# Patient Record
Sex: MALE | Race: WHITE | NOT HISPANIC OR LATINO | Employment: OTHER | URBAN - METROPOLITAN AREA
[De-identification: names, ages, dates, MRNs, and addresses within clinical notes are randomized per-mention and may not be internally consistent; named-entity substitution may affect disease eponyms.]

---

## 2017-02-17 ENCOUNTER — RADIANT APPOINTMENT (OUTPATIENT)
Dept: GENERAL RADIOLOGY | Facility: CLINIC | Age: 42
End: 2017-02-17
Attending: INTERNAL MEDICINE
Payer: MEDICARE

## 2017-02-17 ENCOUNTER — OFFICE VISIT (OUTPATIENT)
Dept: FAMILY MEDICINE | Facility: CLINIC | Age: 42
End: 2017-02-17
Payer: MEDICARE

## 2017-02-17 VITALS
HEIGHT: 65 IN | BODY MASS INDEX: 32.29 KG/M2 | WEIGHT: 193.8 LBS | SYSTOLIC BLOOD PRESSURE: 136 MMHG | DIASTOLIC BLOOD PRESSURE: 79 MMHG | TEMPERATURE: 98.4 F | OXYGEN SATURATION: 95 % | HEART RATE: 76 BPM

## 2017-02-17 DIAGNOSIS — M25.551 PAIN OF BOTH HIP JOINTS: ICD-10-CM

## 2017-02-17 DIAGNOSIS — E66.811 OBESITY (BMI 30.0-34.9): ICD-10-CM

## 2017-02-17 DIAGNOSIS — K12.0 CANKER SORE: ICD-10-CM

## 2017-02-17 DIAGNOSIS — M25.552 PAIN OF BOTH HIP JOINTS: ICD-10-CM

## 2017-02-17 DIAGNOSIS — M79.645 PAIN OF FINGER OF LEFT HAND: Primary | ICD-10-CM

## 2017-02-17 PROCEDURE — 73130 X-RAY EXAM OF HAND: CPT | Mod: LT

## 2017-02-17 PROCEDURE — 73522 X-RAY EXAM HIPS BI 3-4 VIEWS: CPT

## 2017-02-17 PROCEDURE — 99214 OFFICE O/P EST MOD 30 MIN: CPT | Performed by: INTERNAL MEDICINE

## 2017-02-17 RX ORDER — TRIAMCINOLONE ACETONIDE 0.1 %
PASTE (GRAM) DENTAL 2 TIMES DAILY
Qty: 5 G | Refills: 1 | Status: SHIPPED | OUTPATIENT
Start: 2017-02-17 | End: 2019-08-19

## 2017-02-17 NOTE — NURSING NOTE
"Chief Complaint   Patient presents with     Hip Pain     x 2 months, bilateral hip pain,      Mouth Lesions     x 1 month, baking soda       Initial /79  Pulse 76  Temp 98.4  F (36.9  C) (Tympanic)  Ht 5' 4.75\" (1.645 m)  Wt 193 lb 12.8 oz (87.9 kg)  SpO2 95%  BMI 32.5 kg/m2 Estimated body mass index is 32.5 kg/(m^2) as calculated from the following:    Height as of this encounter: 5' 4.75\" (1.645 m).    Weight as of this encounter: 193 lb 12.8 oz (87.9 kg).  Medication Reconciliation: complete   Carrie MENDES CMA (AAMA)    "

## 2017-02-17 NOTE — PROGRESS NOTES
SUBJECTIVE:                                                    Yusef Alvares is a 41 year old male who presents to clinic today for the following health issues:      Joint Pain     Onset: x 2 months     Description:   Location: left hip and right hip within groin, no lateral pain  Character: Sharp and Dull ache - tolerable     Intensity: mild    Progression of Symptoms: same    Accompanying Signs & Symptoms:  Other symptoms: other random joint pain - left thumb and index finger w/ some limited range of motion and swelling- no redness or warmth.    History:   Previous similar pain: no       Precipitating factors:     Trauma or overuse: YES- some movement, going from sitting to standing     Alleviating factors:  Improved by: nothing       Therapies Tried and outcome: nothing     Concern - canker sore     Onset: x 1.5 month    Description:   Sore on right side of tongue     Intensity: moderate to severe    Progression of Symptoms:  same    Accompanying Signs & Symptoms:  None   He thinks it may not be going away because it is rubbing on his teeth       Previous history of similar problem:   None     Precipitating factors:   Worsened by: nothing     Alleviating factors:  Improved by: nonthing        Therapies Tried and outcome: baking soda       Problem list and histories reviewed & adjusted, as indicated.  Additional history: as documented    Current Outpatient Prescriptions   Medication Sig Dispense Refill     triamcinolone (KENALOG) 0.1 % paste Take by mouth 2 times daily Until sore is healed 5 g 1     simvastatin (ZOCOR) 40 MG tablet Take 1.5 tablets (60 mg) by mouth At Bedtime 135 tablet 3     levothyroxine (SYNTHROID, LEVOTHROID) 50 MCG tablet Take 1 tablet (50 mcg) by mouth daily 90 tablet 2     atomoxetine (STRATTERA) 80 MG capsule Take 80 mg by mouth daily       naltrexone (DEPADE;REVIA) 50 MG tablet Take 50 mg by mouth daily       divalproex (DEPAKOTE ER) 500 MG 24 hr tablet Take 500 mg by mouth daily. Taking  "1500 mg at night       ARIPiprazole (ABILIFY) 15 MG tablet Take 15 mg by mouth daily.       citalopram (CELEXA) 40 MG tablet Take 40 mg by mouth daily.       triamcinolone (KENALOG) 0.5 % cream Apply sparingly to affected area three times daily to elbows 30 g 5     No Known Allergies    ROS:  Constitutional, MSK, HEENT systems are negative, except as otherwise noted.    OBJECTIVE:                                                    /79  Pulse 76  Temp 98.4  F (36.9  C) (Tympanic)  Ht 5' 4.75\" (1.645 m)  Wt 193 lb 12.8 oz (87.9 kg)  SpO2 95%  BMI 32.5 kg/m2  Body mass index is 32.5 kg/(m^2).  GENERAL: healthy, alert and no distress  HENT: small ulceration present on the right lateral surface of the tongue  MS: increased joint prominence of the left second PIP with associated tenderness slight enlargement of the thumb joints as well. No redness or warmth.  Slightly reduced range of motion of hips bilaterally rotation of hips induces some mild pain. Normal strength and sensation in legs    Diagnostic Test Results:  Xray - x-rays of the left hand are fairly unremarkable. Left x-rays of the bilateral hips reveal some mild arthritis     ASSESSMENT/PLAN:                                                        1. Pain of finger of left hand    Unclear etiology, x-rays looked fairly good. He is reassured about the x-ray findings.    - XR Hand Left G/E 3 Views    2. Pain of both hip joints    Mild arthritis in both hips on x-ray.  He was advised to treat pain with NSAIDs.  He is interested in losing weight, which was certainly also be helpful.    - XR Hip Bilateral 2 Views Each    3. Canker sore    Slow healing canker sore on the right lateral surface of the tongue. We'll try some triamcinolone paste see if that will assist with healing.    - triamcinolone (KENALOG) 0.1 % paste; Take by mouth 2 times daily Until sore is healed  Dispense: 5 g; Refill: 1    4. Obesity (BMI 30.0-34.9)    He would like to meet with nutrition " with the goal of losing weight, so referrals placed.    - NUTRITION REFERRAL      Braeden Galeano MD  [facility]

## 2017-02-17 NOTE — MR AVS SNAPSHOT
"              After Visit Summary   2/17/2017    Yusef Alvares    MRN: [id number]           Patient Information     Date Of Birth          1975        Visit Information        Provider Department      2/17/2017 2:40 PM Braeden Galeano MD [facility]        Today's Diagnoses     Pain of finger of left hand    -  1    Pain of both hip joints        Canker sore          Care Instructions    You can try ibuprofen or naproxen for the hip pain.  There looks to be some arthritis in both of your hips.          Follow-ups after your visit        Your next 10 appointments already scheduled     Feb 27, 2017  2:00 PM CST   [facility]ins [facility]    [address]  [facility] [address] [phone]              Who to contact     If you have questions or need follow up information about today's clinic visit or your schedule please contact [facility] directly at [phone].  Normal or non-critical lab and imaging results will be communicated to you by DuckDuckGohart, letter or phone within 4 business days after the clinic has received the results. If you do not hear from us within 7 days, please contact the clinic through Futurlinkt or phone. If you have a critical or abnormal lab result, we will notify you by phone as soon as possible.  Submit refill requests through Scifiniti or call your pharmacy and they will forward the refill request to us. Please allow 3 business days for your refill to be completed.          Additional Information About Your Visit        DuckDuckGohart Information     Scifiniti lets you send messages to your doctor, view your test results, renew your prescriptions, schedule appointments and more. To sign up, go to [email or web address] . Click on \"Log in\" on the left side of the screen, which will take you to the Welcome page. Then click on \"Sign up Now\" on the right side of the page.     You " "will be asked to enter the access code listed below, as well as some personal information. Please follow the directions to create your username and password.     Your access code is: [email or web address]  Expires: 2017  3:33 PM     Your access code will  in 90 days. If you need help or a new code, please call your [facility] clinic or [phone].        Care EveryWhere ID     This is your Care EveryWhere ID. This could be used by other organizations to access your [facility] medical records  [id number]        Your Vitals Were     Pulse Temperature Height Pulse Oximetry BMI (Body Mass Index)       76 98.4  F (36.9  C) (Tympanic) 5' 4.75\" (1.645 m) 95% 32.5 kg/m2        Blood Pressure from Last 3 Encounters:   17 136/79   16 116/76   16 137/86    Weight from Last 3 Encounters:   17 193 lb 12.8 oz (87.9 kg)   16 199 lb (90.3 kg)   16 200 lb (90.7 kg)              We Performed the Following     XR Hand Left G/E 3 Views     XR Hip Bilateral 2 Views Each          Today's Medication Changes          These changes are accurate as of: 17  3:33 PM.  If you have any questions, ask your nurse or doctor.               Start taking these medicines.        Dose/Directions    triamcinolone 0.1 % paste   Commonly known as:  KENALOG   Used for:  Canker sore   Started by:  Braeden Galeano MD        Take by mouth 2 times daily Until sore is healed   Quantity:  5 g   Refills:  1            Where to get your medicines      These medications were sent to [id number] - [place], [place] - [address] AT [facility] [address]  [address] [facility] [facility] [address] [address]     Phone:  [phone]     triamcinolone 0.1 % paste                Primary Care Provider Office Phone # Fax #    Yusef Katz [phone]       [facility] [address] [facility] [address] [address]        Thank you!     Thank you for choosing [facility]  for " your care. Our goal is always to provide you with excellent care. Hearing back from our patients is one way we can continue to improve our services. Please take a few minutes to complete the written survey that you may receive in the mail after your visit with us. Thank you!             Your Updated Medication List - Protect others around you: Learn how to safely use, store and throw away your medicines at www.disposemymeds.org.          This list is accurate as of: 2/17/17  3:33 PM.  Always use your most recent med list.                   Brand Name Dispense Instructions for use    ABILIFY 15 MG tablet   Generic drug:  ARIPiprazole      Take 15 mg by mouth daily.       citalopram 40 MG tablet    celeXA     Take 40 mg by mouth daily.       DEPAKOTE  MG 24 hr tablet   Generic drug:  divalproex      Take 500 mg by mouth daily. Taking 1500 mg at night       levothyroxine 50 MCG tablet    SYNTHROID/LEVOTHROID    90 tablet    Take 1 tablet (50 mcg) by mouth daily       naltrexone 50 MG tablet    DEPADE;REVIA     Take 50 mg by mouth daily       simvastatin 40 MG tablet    ZOCOR    135 tablet    Take 1.5 tablets (60 mg) by mouth At Bedtime       STRATTERA 80 MG capsule   Generic drug:  atomoxetine      Take 80 mg by mouth daily       triamcinolone 0.1 % paste    KENALOG    5 g    Take by mouth 2 times daily Until sore is healed       triamcinolone 0.5 % cream    KENALOG    30 g    Apply sparingly to affected area three times daily to elbows

## 2017-02-17 NOTE — PATIENT INSTRUCTIONS
You can try ibuprofen or naproxen for the hip pain.  There looks to be some arthritis in both of your hips.

## 2017-02-27 ENCOUNTER — OFFICE VISIT (OUTPATIENT)
Dept: SLEEP MEDICINE | Facility: CLINIC | Age: 42
End: 2017-02-27
Payer: MEDICARE

## 2017-02-27 VITALS
HEIGHT: 65 IN | SYSTOLIC BLOOD PRESSURE: 133 MMHG | HEART RATE: 82 BPM | DIASTOLIC BLOOD PRESSURE: 84 MMHG | WEIGHT: 193 LBS | OXYGEN SATURATION: 99 % | BODY MASS INDEX: 32.15 KG/M2

## 2017-02-27 DIAGNOSIS — G47.33 OSA (OBSTRUCTIVE SLEEP APNEA): Primary | ICD-10-CM

## 2017-02-27 PROCEDURE — 99214 OFFICE O/P EST MOD 30 MIN: CPT | Performed by: FAMILY MEDICINE

## 2017-02-27 NOTE — PATIENT INSTRUCTIONS
To help to try and entrain some sort of daytime / night time pattern, I would recommend we have you take a low dose of melatonin (usually 3 mg) and take it the same time each day, I recommend around 8pm.

## 2017-02-27 NOTE — PROGRESS NOTES
Sleep Follow-Up Visit:    Date on this visit: 2/27/2017    Yusef Alvares comes in today for annual follow-up of mild to moderate ABDULKADIR (7/30/2008 with RDI 24, AHI 13.6, angelita desat 87%) treated with auto-titrate CPAP 10-15 cm H2O.  Significant co-morbidities of schizophrenia NOS vs schizoaffective disorder NOS.     Overall, he feels the CPAP is going well.  Feels more awake and alert during the day.  His main sleep concern today is a very irregular sleep wake pattern.  Seems to stem from limited constraints on time, so will nap off and on during the day and then be up for a majority of the night.  This does impact his CPAP usage.  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  Denies any significant changes to his psychiatric medications, is prescribed Zolpidem PRN, but has largely not taken it due to concerns of addiction.    CPAP download from past 6 months with CPAP appearing to be on set pressure 12 cm H2O, though lists average pressure of 11.2 cm H2O.  AHI 5.  Average daily usage of 3:14.    Prior sleep studies:  7/30/2008 - PSG with RDI 24, AHI 13.6, angelita desat 87%  8/10/2008 - CPAP titration PSG with CPAP 7 optimal  7/9/2014 - CPAP titration PSG with weight 198 lbs, CPAP 11-12 optimal  1/7/2015 - HST with AHI 19.9, angelita desat 81%, question of mild sustained hypoxemia.  Technically a limited study due to TST of ~3.5 hours.    Problem List:  Patient Active Problem List    Diagnosis Date Noted     Schizophrenia (H) 03/07/2011     Priority: High     Lesions of both ulnar nerves 11/02/2015     Priority: Medium     Right-sided low back pain with right-sided sciatica 11/02/2015     Priority: Medium     CARDIOVASCULAR SCREENING; LDL GOAL LESS THAN 130 10/30/2015     Priority: Medium     Schizoaffective disorder (H) 12/09/2014     Problem list name updated by automated process. Provider to review  Diagnosis updated by automated process. Provider to review and  confirm.       ABDULKADIR (obstructive sleep apnea) 10/10/2013     [facility] 8/10/2008  CPAP titration 7cmH2O       TB (tuberculosis) contact 02/20/2013     INH treatment 2000    Dentist at [facility] need negative mantoux or chest xray before they will do work   Phone [phone] fax# [phone]       Hypothyroidism 01/16/2012     Weight gain 03/07/2011     Hyperlipidemia LDL goal <130 03/07/2011     Acne scarring 03/07/2011        Impression/Plan:    1.)  Mild to moderate ABDULKADIR (7/30/2008 with RDI 24, AHI 13.6, angelita desat 87%) treated with auto-titrate CPAP 10-15 cm H2O.      - Significant co-morbidities of schizophrenia NOS vs schizoaffective disorder NOS.   - Appears to be doing well and controlled on current CPAP settings (CPAP 12 vs auto 10-15)   - Continue on current settings and would recommend follow-up with us if he decides to retry getting new equipment to  through the compliance process in a non-judgmental manner.    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    He will follow up with me in about 6 month(s).     Twenty-five minutes spent with patient, all of which were spent face-to-face counseling, consulting, coordinating plan of care.      Shay Cummins MD    CC: Yusef Katz

## 2017-03-03 ENCOUNTER — HOSPITAL ENCOUNTER (OUTPATIENT)
Dept: PHYSICAL THERAPY | Facility: CLINIC | Age: 42
Setting detail: THERAPIES SERIES
End: 2017-03-03
Attending: INTERNAL MEDICINE
Payer: COMMERCIAL

## 2017-03-03 PROCEDURE — 97161 PT EVAL LOW COMPLEX 20 MIN: CPT | Mod: GP | Performed by: PHYSICAL THERAPIST

## 2017-03-03 PROCEDURE — G8981 BODY POS CURRENT STATUS: HCPCS | Mod: GP,CI | Performed by: PHYSICAL THERAPIST

## 2017-03-03 PROCEDURE — 40000718 ZZHC STATISTIC PT DEPARTMENT ORTHO VISIT: Performed by: PHYSICAL THERAPIST

## 2017-03-03 PROCEDURE — 97110 THERAPEUTIC EXERCISES: CPT | Mod: GP | Performed by: PHYSICAL THERAPIST

## 2017-03-03 PROCEDURE — G8982 BODY POS GOAL STATUS: HCPCS | Mod: GP,CH | Performed by: PHYSICAL THERAPIST

## 2017-03-03 NOTE — PROGRESS NOTES
Yusef Alvares   PHYSICAL THERAPY EVALUATION    03/03/17 0800   General Information   Type of Visit Initial OP Ortho PT Evaluation   Start of Care Date 03/03/17   Referring Physician Dr. Galeano   Patient/Family Goals Statement decrease pain   Orders Evaluate and Treat   Date of Order 02/17/17   Insurance Type Medicare;[employer]are   Medical Diagnosis B hip pain   Surgical/Medical history reviewed Yes  (schizoaffective, depression)   Body Part(s)   Body Part(s) Hip   Presentation and Etiology   Pertinent history of current problem (include personal factors and/or comorbidities that impact the POC) B hip joint pain, about one month.  no injury.  Sx increase sitting, getting up from sitting, first few step walking.  Pain 0-4/10.  Pain at ant hip, groin, where joints would be.   Onset date of current episode/exacerbation 02/03/17   Prior Level of Function   Functional Level Prior Comment little walking, not very active   Hip Objective Findings   Side (if bilateral, select both right and left) Right   Posture increased lumbar lordosis, ant tipped pelvis   Lumbar ROM WNL, no sx   Pelvic Screen appears level   Functional Closed Chain Screen squat with ant knee migration   Straight Leg Raise Test neg   Scour Test neg   RUFINA Test neg   FADIR Test neg B, L mobility slightly less than R   Right Hip Flexion PROM WNL B   Right Hip ER PROM 45*B   Right Hip IR PROM 20*B no pain   Right Hip Ext PROM 5*, L 0-5*, tight B   Right Hip Flexion Strength 5   Right Hip Abduction Strength 4+   Right Hip Extension Strength 4+   Right Knee Flexion Strength 5   Right Knee Extension Strength 5   Shay Flexibility Test +B   Right Hamstring Flexibility 60* B   Right Prone Quad Flexibility WNL B   Planned Therapy Interventions   Planned Therapy Interventions stretching;strengthening   Clinical Impression   Criteria for Skilled Therapeutic Interventions Met yes, treatment indicated   PT Diagnosis B hip pain, ant hip tightness, mild weakness   Functional  limitations due to impairments sitting, geting up from sitting   Clinical Presentation Stable/Uncomplicated   Clinical Presentation Rationale no factors affecting rx plan   Clinical Decision Making (Complexity) Low complexity   Therapy Frequency 1 time/week   Predicted Duration of Therapy Intervention (days/wks) every other week x6wk   Risk & Benefits of therapy have been explained Yes   Patient, Family & other staff in agreement with plan of care Yes   Clinical Impression Comments pt would benefit form skilled PT to develop HEP of stretching and strenght ex to dec pain, improve function   Education Assessment   Preferred Learning Style Pictures/video   Barriers to Learning No barriers   ORTHO GOALS   PT Ortho Eval Goals 1;2   Ortho Goal 1   Goal Description pt will be able to get up from prolonged sitting without hip pain in 6wk   Target Date 04/14/17   Ortho Goal 2   Goal Description pt indep inhome exercise program for self management of sx in 6wk   Target Date 04/14/17   Total Evaluation Time   Total Evaluation Time 15   Therapy Certification   Certification date from 03/03/17   Certification date to 04/14/17   Medical Diagnosis hip pain   Kris Hoenk, PT #[id number]  [facility]

## 2017-03-03 NOTE — PROGRESS NOTES
[facility]          OUTPATIENT PHYSICAL THERAPY ORTHOPEDIC EVALUATION  PLAN OF TREATMENT FOR OUTPATIENT REHABILITATION  (COMPLETE FOR INITIAL CLAIMS ONLY)  Patient's Last Name, First Name, M.I.  YOB: 1975  Yusef Alvares       Provider s Name:  [facility]   Medical Record No.  [id number]   Start of Care Date:  03/03/17   Onset Date:  02/03/17   Type:     _X__PT   ___OT   ___SLP Medical Diagnosis:  hip pain     PT Diagnosis:  B hip pain, ant hip tightness, mild weakness   Visits from SOC:  1      _________________________________________________________________________________  Plan of Treatment/Functional Goals:  stretching, strengthening           Goals     Goal Description: pt will be able to get up from prolonged sitting without hip pain in 6wk  Target Date: 04/14/17       Goal Description: pt indep inhome exercise program for self management of sx in 6wk  Target Date: 04/14/17     Therapy Frequency:  1 time/week  Predicted Duration of Therapy Intervention:  every other week x6wk    Kris Hoenk, PT                 I CERTIFY THE NEED FOR THESE SERVICES FURNISHED UNDER        THIS PLAN OF TREATMENT AND WHILE UNDER MY CARE     (Physician co-signature of this document indicates review and certification of the therapy plan).                       Certification Date From:  03/03/17   Certification Date To:  04/14/17    Referring Provider:  Dr. Galeano    Initial Assessment        See Epic Evaluation Start of Care Date: 03/03/17

## 2017-03-17 ENCOUNTER — HOSPITAL ENCOUNTER (OUTPATIENT)
Dept: PHYSICAL THERAPY | Facility: CLINIC | Age: 42
Setting detail: THERAPIES SERIES
End: 2017-03-17
Attending: INTERNAL MEDICINE
Payer: COMMERCIAL

## 2017-03-17 PROCEDURE — 97110 THERAPEUTIC EXERCISES: CPT | Mod: GP | Performed by: PHYSICAL THERAPIST

## 2017-03-17 PROCEDURE — 40000718 ZZHC STATISTIC PT DEPARTMENT ORTHO VISIT: Performed by: PHYSICAL THERAPIST

## 2017-03-21 PROBLEM — E66.811 OBESITY (BMI 30.0-34.9): Status: ACTIVE | Noted: 2017-03-21

## 2017-03-30 ENCOUNTER — TELEPHONE (OUTPATIENT)
Dept: FAMILY MEDICINE | Facility: CLINIC | Age: 42
End: 2017-03-30

## 2017-03-30 DIAGNOSIS — E78.5 HYPERLIPIDEMIA LDL GOAL <130: Primary | ICD-10-CM

## 2017-03-31 NOTE — TELEPHONE ENCOUNTER
Patient's insurance does not want to cover more than 30 tabs per 30 days of simvastatin.  Can prescription we written for simvastatin 40 mg once daily and 20 mg once daily to achieve the 60 mg dose?

## 2017-04-04 RX ORDER — SIMVASTATIN 20 MG
20 TABLET ORAL AT BEDTIME
Qty: 90 TABLET | Refills: 3 | Status: SHIPPED | OUTPATIENT
Start: 2017-04-04 | End: 2018-04-24

## 2017-04-04 RX ORDER — SIMVASTATIN 40 MG
40 TABLET ORAL AT BEDTIME
Qty: 90 TABLET | Refills: 3 | Status: SHIPPED | OUTPATIENT
Start: 2017-04-04 | End: 2018-05-30

## 2017-05-03 NOTE — PROGRESS NOTES
"  Yusef Alvares   PHYSICAL THERAPY DISCHARGE  05/3/17 0900   Signing Clinician's Name / Credentials   Signing clinician's name / credentials Kris Hoenk, PT   Session Number   Session Number 2/4 [facility] to 4/14   Ortho Goal 1   Goal Description pt will be able to get up from prolonged sitting without hip pain in 6wk   Target Date 04/14/17   Ortho Goal 2   Goal Description pt indep inhome exercise program for self management of sx in 6wk   Target Date 04/14/17   Subjective Report   Subjective Report less pain, lost his exercise sheets, getting up from sitting not as bad   Therapeutic Procedure/exercise   Minutes 25   Skilled Intervention review and progression strength and stretches for HEP   Patient Response fatigues with reps   Treatment Detail recumb bike 3min warm up, LTRS, SKC, piriformis stretches, SL hip abd x20 B, prone hip ext x15 B , bridge x20, 6\" lateral step down x15 B - R fatigues, add hip flexor stretch in kneeling   Plan   Plan for next session continue, see in 2 weeks, poss DC  Patient has not been seen in over 30 days and will be discharged at this time.  Final status as above     Total Session Time   Total Session Time 25   Kris Hoenk, PT #[id number]  [facility]    "

## 2017-05-03 NOTE — ADDENDUM NOTE
Encounter addended by: Hoenk, Kris, PT on: 5/3/2017 11:35 AM<BR>     Actions taken: Flowsheet data copied forward, Sign clinical note, Flowsheet accepted, Episode resolved

## 2017-06-05 DIAGNOSIS — E03.9 HYPOTHYROIDISM: ICD-10-CM

## 2017-06-06 NOTE — TELEPHONE ENCOUNTER
Levothyroxine     Last Written Prescription Date: 04/04/2016  Last Quantity: 90, # refills: 2  Last Office Visit with [facility] or [facility] prescribing provider: 02/17/2017        TSH   Date Value Ref Range Status   11/02/2015 1.70 0.40 - 4.00 mU/L Final     Saroj RodriguezR)

## 2017-06-07 RX ORDER — LEVOTHYROXINE SODIUM 50 UG/1
50 TABLET ORAL DAILY
Qty: 30 TABLET | Refills: 0 | Status: SHIPPED | OUTPATIENT
Start: 2017-06-07 | End: 2017-07-13

## 2017-07-13 DIAGNOSIS — E03.9 HYPOTHYROIDISM: ICD-10-CM

## 2017-07-13 NOTE — TELEPHONE ENCOUNTER
Levothyroxine    Last Written Prescription Date: 06/07/17  Last Quantity: 30, # refills: 0  Last Office Visit with [facility], [facility] or [facility] prescribing provider: 02/17/17   Next 5 appointments (look out 90 days)     Jul 14, 2017  9:40 AM CDT   Office Visit with Yusef Katz MD   [facility] ([facility]    [address] [address]   [phone]                   TSH   Date Value Ref Range Status   11/02/2015 1.70 0.40 - 4.00 mU/L Final

## 2017-07-14 ENCOUNTER — OFFICE VISIT (OUTPATIENT)
Dept: FAMILY MEDICINE | Facility: CLINIC | Age: 42
End: 2017-07-14
Payer: COMMERCIAL

## 2017-07-14 VITALS
HEART RATE: 72 BPM | BODY MASS INDEX: 32.29 KG/M2 | TEMPERATURE: 97.6 F | WEIGHT: 193.8 LBS | DIASTOLIC BLOOD PRESSURE: 64 MMHG | SYSTOLIC BLOOD PRESSURE: 120 MMHG | HEIGHT: 65 IN

## 2017-07-14 DIAGNOSIS — E03.9 HYPOTHYROIDISM, UNSPECIFIED TYPE: ICD-10-CM

## 2017-07-14 DIAGNOSIS — E78.5 HYPERLIPIDEMIA LDL GOAL <130: Primary | ICD-10-CM

## 2017-07-14 DIAGNOSIS — F66 PORNOGRAPHY ADDICTION: Primary | ICD-10-CM

## 2017-07-14 LAB
ALBUMIN SERPL-MCNC: 3.6 G/DL (ref 3.4–5)
ALP SERPL-CCNC: 43 U/L (ref 40–150)
ALT SERPL W P-5'-P-CCNC: 21 U/L (ref 0–70)
AMYLASE SERPL-CCNC: 211 U/L (ref 30–110)
AST SERPL W P-5'-P-CCNC: 17 U/L (ref 0–45)
BILIRUB DIRECT SERPL-MCNC: 0.2 MG/DL (ref 0–0.2)
BILIRUB SERPL-MCNC: 1.2 MG/DL (ref 0.2–1.3)
CHOLEST SERPL-MCNC: 194 MG/DL
GLUCOSE SERPL-MCNC: 91 MG/DL (ref 70–99)
HBA1C MFR BLD: 5.3 % (ref 4.3–6)
HDLC SERPL-MCNC: 51 MG/DL
LDLC SERPL CALC-MCNC: 112 MG/DL
NONHDLC SERPL-MCNC: 143 MG/DL
PROT SERPL-MCNC: 7.2 G/DL (ref 6.8–8.8)
T4 FREE SERPL-MCNC: 0.89 NG/DL (ref 0.76–1.46)
TRIGL SERPL-MCNC: 157 MG/DL
TSH SERPL DL<=0.05 MIU/L-ACNC: 1.54 MU/L (ref 0.4–4)
VALPROATE SERPL-MCNC: 105 MG/L (ref 50–100)

## 2017-07-14 PROCEDURE — 80061 LIPID PANEL: CPT | Performed by: FAMILY MEDICINE

## 2017-07-14 PROCEDURE — 85025 COMPLETE CBC W/AUTO DIFF WBC: CPT | Performed by: PSYCHIATRY & NEUROLOGY

## 2017-07-14 PROCEDURE — 80076 HEPATIC FUNCTION PANEL: CPT | Performed by: PSYCHIATRY & NEUROLOGY

## 2017-07-14 PROCEDURE — 83036 HEMOGLOBIN GLYCOSYLATED A1C: CPT | Performed by: PSYCHIATRY & NEUROLOGY

## 2017-07-14 PROCEDURE — 99213 OFFICE O/P EST LOW 20 MIN: CPT | Performed by: FAMILY MEDICINE

## 2017-07-14 PROCEDURE — 36415 COLL VENOUS BLD VENIPUNCTURE: CPT | Performed by: FAMILY MEDICINE

## 2017-07-14 PROCEDURE — 82947 ASSAY GLUCOSE BLOOD QUANT: CPT | Performed by: PSYCHIATRY & NEUROLOGY

## 2017-07-14 PROCEDURE — 84443 ASSAY THYROID STIM HORMONE: CPT | Performed by: FAMILY MEDICINE

## 2017-07-14 PROCEDURE — 82150 ASSAY OF AMYLASE: CPT | Performed by: PSYCHIATRY & NEUROLOGY

## 2017-07-14 PROCEDURE — 80164 ASSAY DIPROPYLACETIC ACD TOT: CPT | Performed by: PSYCHIATRY & NEUROLOGY

## 2017-07-14 PROCEDURE — 84439 ASSAY OF FREE THYROXINE: CPT | Performed by: FAMILY MEDICINE

## 2017-07-14 NOTE — NURSING NOTE
"No chief complaint on file.      Initial /86  Pulse 72  Temp 97.6  F (36.4  C) (Tympanic)  Ht 5' 5\" (1.651 m)  Wt 193 lb 12.8 oz (87.9 kg)  BMI 32.25 kg/m2 Estimated body mass index is 32.25 kg/(m^2) as calculated from the following:    Height as of this encounter: 5' 5\" (1.651 m).    Weight as of this encounter: 193 lb 12.8 oz (87.9 kg).  Medication Reconciliation: complete     Petra Ruvalcaba MA      "

## 2017-07-14 NOTE — LETTER
[facility]  [address] [address]  Phone: [phone]    July 14, 2017    Yusef Dia  [address] [address]    Dear Mr. Alvares,    I am writing to inform you of the results of the laboratory tests you had done recently.     Recent Labs   Lab Test  07/14/17   1038  09/29/16   1045  02/06/14   1349  09/13/13   1338   CHOL  194  220*  180  214*   HDL  51  47  39*  37*   LDL  112*  150*  111  143*   TRIG  157*  113  154*  165*   CHOLHDLRATIO   --    --   5.0  6.0*   NHDL  143*  173*   --    --        Goal levels are:  Cholesterol: Desirable is less than 200. Borderline is 200-240.  HDL (good cholesterol): Desirable is greater than 40 in men, 50 in women.  LDL (bad cholesterol): Desirable is less than 130, or less than 100 if you have heart disease, peripheral vascular disease or diabetes. Borderline is 130-160.  Triglycerides: Desirable is less than 150. Borderline is 150-300.     ALT and AST are liver function tests. Yours are normal.    It is my pleasure to participate in your health care needs. Please feel free to call if any questions or concerns.    Sincerely,      Yusef Katz MD / dillon

## 2017-07-14 NOTE — PATIENT INSTRUCTIONS
Please stop at lab on your way out today.    I am checking your cholesterol and also your thyroid function.          Thank you for choosing [facility].  You may be receiving a survey in the mail from Glenda Taylor regarding your visit today.  Please take a few minutes to complete and return the survey to let us know how we are doing.      If you have questions or concerns, please contact us via ApeniMED or you can contact your care team at [phone].    Our Clinic hours are:  Monday 6:40 am  to 7:00 pm  Tuesday -Friday 6:40 am to 5:00 pm    The [place] outpatient lab hours are:  Monday - Friday 6:10 am to 4:45 pm  Saturdays 7:00 am to 11:00 am  Appointments are required, call [phone]    If you have clinical questions after hours or would like to schedule an appointment,  call the clinic at [phone].

## 2017-07-14 NOTE — MR AVS SNAPSHOT
After Visit Summary   7/14/2017    Yusef Alvares    MRN: [id number]           Patient Information     Date Of Birth          1975        Visit Information        Provider Department      7/14/2017 10:00 AM Yusef Katz MD [facility]        Today's Diagnoses     Hyperlipidemia LDL goal <130    -  1    Hypothyroidism, unspecified type          Care Instructions    Please stop at lab on your way out today.    I am checking your cholesterol and also your thyroid function.          Thank you for choosing [facility].  You may be receiving a survey in the mail from [facility] regarding your visit today.  Please take a few minutes to complete and return the survey to let us know how we are doing.      If you have questions or concerns, please contact us via [employer] or you can contact your care team at [phone].    Our Clinic hours are:  Monday 6:40 am  to 7:00 pm  Tuesday -Friday 6:40 am to 5:00 pm    The [place] outpatient lab hours are:  Monday - Friday 6:10 am to 4:45 pm  Saturdays 7:00 am to 11:00 am  Appointments are required, call [phone]    If you have clinical questions after hours or would like to schedule an appointment,  call the clinic at [phone].            Follow-ups after your visit        Who to contact     If you have questions or need follow up information about today's clinic visit or your schedule please contact [facility] directly at [phone].  Normal or non-critical lab and imaging results will be communicated to you by [employer], letter or phone within 4 business days after the clinic has received the results. If you do not hear from us within 7 days, please contact the clinic through [employer] or phone. If you have a critical or abnormal lab result, we will notify you by phone as soon as possible.  Submit refill requests through [employer] or call your pharmacy and they will forward the refill request to us. Please allow 3 business  "days for your refill to be completed.          Additional Information About Your Visit        MyChart Information     "[employer] lets you send messages to your doctor, view your test results, renew your prescriptions, schedule appointments and more. To sign up, go to [email or web address] . Click on \"Log in\" on the left side of the screen, which will take you to the Welcome page. Then click on \"Sign up Now\" on the right side of the page.     You will be asked to enter the access code listed below, as well as some personal information. Please follow the directions to create your username and password.     Your access code is: [email or web address]  Expires: 10/12/2017  9:56 AM     Your access code will  in 90 days. If you need help or a new code, please call your [place] clinic or [phone].        Care EveryWhere ID     This is your Care EveryWhere ID. This could be used by other organizations to access your [place] medical records  [id number]        Your Vitals Were     Pulse Temperature Height BMI (Body Mass Index)          72 97.6  F (36.4  C) (Tympanic) 5' 5\" (1.651 m) 32.25 kg/m2         Blood Pressure from Last 3 Encounters:   17 120/64   17 133/84   17 136/79    Weight from Last 3 Encounters:   17 193 lb 12.8 oz (87.9 kg)   17 193 lb (87.5 kg)   17 193 lb 12.8 oz (87.9 kg)              We Performed the Following     Lipid Profile with reflex to direct LDL     T4 FREE     TSH        Primary Care Provider Office Phone # Fax #    Yusef Katz [phone]       [facility] [address] [facility] [address]        Equal Access to Services     ELIZABETH KISER : Ceasar Montiel, wajose rda luqadaha, qaybta kaalmada negrita, vince burks. [facility] [phone].    ATENCIÓN: Si habla español, tiene a lima disposición servicios gratuitos de asistencia lingüística. Llame al [phone].    We comply with " applicable federal civil rights laws and [place] laws. We do not discriminate on the basis of race, color, national origin, age, disability sex, sexual orientation or gender identity.            Thank you!     Thank you for choosing [facility]  for your care. Our goal is always to provide you with excellent care. Hearing back from our patients is one way we can continue to improve our services. Please take a few minutes to complete the written survey that you may receive in the mail after your visit with us. Thank you!             Your Updated Medication List - Protect others around you: Learn how to safely use, store and throw away your medicines at www.disposemymeds.org.          This list is accurate as of: 7/14/17 10:34 AM.  Always use your most recent med list.                   Brand Name Dispense Instructions for use Diagnosis    ABILIFY 15 MG tablet   Generic drug:  ARIPiprazole      Take 15 mg by mouth daily.        citalopram 40 MG tablet    celeXA     Take 40 mg by mouth daily.        DEPAKOTE  MG 24 hr tablet   Generic drug:  divalproex      Take 500 mg by mouth daily. Taking 1500 mg at night        levothyroxine 50 MCG tablet    SYNTHROID/LEVOTHROID    30 tablet    Take 1 tablet (50 mcg) by mouth daily (Needs lab work)    Hypothyroidism       naltrexone 50 MG tablet    DEPADE;REVIA     Take 50 mg by mouth daily        * simvastatin 40 MG tablet    ZOCOR    90 tablet    Take 1 tablet (40 mg) by mouth At Bedtime Take with 20 mg dose so that the total dose is 60 mg a day    Hyperlipidemia LDL goal <130       * simvastatin 20 MG tablet    ZOCOR    90 tablet    Take 1 tablet (20 mg) by mouth At Bedtime Take with 40 mg for total dose of 60 mg a day.    Hyperlipidemia LDL goal <130       STRATTERA 80 MG capsule   Generic drug:  atomoxetine      Take 80 mg by mouth daily        triamcinolone 0.1 % paste    KENALOG    5 g    Take by mouth 2 times daily Until sore is healed    Canker sore        triamcinolone 0.5 % cream    KENALOG    30 g    Apply sparingly to affected area three times daily to elbows    Eczema       * Notice:  This list has 2 medication(s) that are the same as other medications prescribed for you. Read the directions carefully, and ask your doctor or other care provider to review them with you.

## 2017-07-14 NOTE — PROGRESS NOTES
"  SUBJECTIVE:                                                    Yusef Alvares is a 41 year old male who presents to clinic today for the following health issues:      Hyperlipidemia Follow-Up      Rate your low fat/cholesterol diet?: not monitoring fat    Taking statin?  Yes, Short-term memory problem    Other lipid medications/supplements?:  none    Hypertension Follow-up      Outpatient blood pressures are not being checked.    Low Salt Diet: not monitoring salt      Amount of exercise or physical activity: 1 day/week for an average of 15-30 minutes    Problems taking medications regularly: Yes- hard to remember to take his medications without a reminder    Medication side effects: Yes- he has noticed his short term memory has been getting worse     Diet: regular (no restrictions)    Problem list and histories reviewed & adjusted, as indicated.  Additional history: as documented        Reviewed and updated as needed this visit by clinical staff  Tobacco  Allergies  Meds  Med Hx  Surg Hx  Fam Hx  Soc Hx      Reviewed and updated as needed this visit by Provider         ROS:  CONSTITUTIONAL:NEGATIVE for fever, chills, change in weight  INTEGUMENTARY/SKIN: NEGATIVE for worrisome rashes, moles or lesions  RESP:NEGATIVE for significant cough or SOB  CV: NEGATIVE for chest pain, palpitations or peripheral edema  MUSCULOSKELETAL: NEGATIVE for significant arthralgias or myalgia  NEURO: NEGATIVE for weakness, dizziness or paresthesias  PSYCHIATRIC: reports some fatigue and some memory issues and relating this to his psych meds, recommend to talk with psychiatry about his meds    OBJECTIVE:                                                    /64  Pulse 72  Temp 97.6  F (36.4  C) (Tympanic)  Ht 5' 5\" (1.651 m)  Wt 193 lb 12.8 oz (87.9 kg)  BMI 32.25 kg/m2  Body mass index is 32.25 kg/(m^2).  GENERAL APPEARANCE: alert, no distress and cooperative  RESP: lungs clear to auscultation - no rales, rhonchi or " wheezes  CV: regular rates and rhythm, normal S1 S2, no S3 or S4 and no murmur, click or rub  ABDOMEN: soft, nontender, without hepatosplenomegaly or masses and bowel sounds normal  MS: extremities normal- no gross deformities noted  SKIN: no suspicious lesions or rashes  NEURO: Normal strength and tone, mentation intact and speech normal  PSYCH: mentation appears normal and affect normal/bright         ASSESSMENT/PLAN:                                                    1. Hyperlipidemia LDL goal <130  Need to check lab  - Lipid Profile with reflex to direct LDL    2. Hypothyroidism, unspecified type  Need to check lab prior to refilling his meds  - TSH  - T4 FREE      See Patient Instructions    Yusef Katz MD  [facility]

## 2017-07-15 LAB
BASOPHILS # BLD AUTO: 0.1 10E9/L (ref 0–0.2)
BASOPHILS NFR BLD AUTO: 1 %
DIFFERENTIAL METHOD BLD: NORMAL
EOSINOPHIL # BLD AUTO: 0.2 10E9/L (ref 0–0.7)
EOSINOPHIL NFR BLD AUTO: 3.4 %
ERYTHROCYTE [DISTWIDTH] IN BLOOD BY AUTOMATED COUNT: 12.1 % (ref 10–15)
HCT VFR BLD AUTO: 47 % (ref 40–53)
HGB BLD-MCNC: 15.9 G/DL (ref 13.3–17.7)
IMM GRANULOCYTES # BLD: 0 10E9/L (ref 0–0.4)
IMM GRANULOCYTES NFR BLD: 0.3 %
LYMPHOCYTES # BLD AUTO: 2.1 10E9/L (ref 0.8–5.3)
LYMPHOCYTES NFR BLD AUTO: 33.2 %
MCH RBC QN AUTO: 30.2 PG (ref 26.5–33)
MCHC RBC AUTO-ENTMCNC: 33.8 G/DL (ref 31.5–36.5)
MCV RBC AUTO: 89 FL (ref 78–100)
MONOCYTES # BLD AUTO: 0.8 10E9/L (ref 0–1.3)
MONOCYTES NFR BLD AUTO: 12.8 %
NEUTROPHILS # BLD AUTO: 3.1 10E9/L (ref 1.6–8.3)
NEUTROPHILS NFR BLD AUTO: 49.3 %
PLATELET # BLD AUTO: 218 10E9/L (ref 150–450)
RBC # BLD AUTO: 5.27 10E12/L (ref 4.4–5.9)
WBC # BLD AUTO: 6.2 10E9/L (ref 4–11)

## 2017-07-18 RX ORDER — LEVOTHYROXINE SODIUM 50 UG/1
TABLET ORAL
Qty: 90 TABLET | Refills: 3 | Status: SHIPPED | OUTPATIENT
Start: 2017-07-18 | End: 2018-07-30

## 2017-07-18 NOTE — TELEPHONE ENCOUNTER
Levothyroxine    Last Written Prescription Date: 06/07/17  Last Quantity: 30, # refills: 0  Last Office Visit with [facility] or [facility] prescribing provider: 02/17/17     TSH   Date Value Ref Range Status   07/14/2017 1.54 0.40 - 4.00 mU/L Final     Prescription approved per [facility] Refill Protocol.    Patti ART Rn

## 2017-08-18 DIAGNOSIS — R74.8 ELEVATED AMYLASE: Primary | ICD-10-CM

## 2017-08-18 LAB — AMYLASE SERPL-CCNC: 194 U/L (ref 30–110)

## 2017-08-18 PROCEDURE — 82150 ASSAY OF AMYLASE: CPT | Performed by: PSYCHIATRY & NEUROLOGY

## 2017-08-18 PROCEDURE — 36415 COLL VENOUS BLD VENIPUNCTURE: CPT | Performed by: PSYCHIATRY & NEUROLOGY

## 2017-09-28 ENCOUNTER — TRANSFERRED RECORDS (OUTPATIENT)
Dept: HEALTH INFORMATION MANAGEMENT | Facility: CLINIC | Age: 42
End: 2017-09-28

## 2017-10-04 ENCOUNTER — TELEPHONE (OUTPATIENT)
Dept: FAMILY MEDICINE | Facility: CLINIC | Age: 42
End: 2017-10-04

## 2017-10-04 DIAGNOSIS — Z13.1 SCREENING FOR DIABETES MELLITUS: ICD-10-CM

## 2017-10-04 DIAGNOSIS — E03.9 HYPOTHYROIDISM, UNSPECIFIED TYPE: Primary | ICD-10-CM

## 2017-10-04 DIAGNOSIS — E78.5 HYPERLIPIDEMIA LDL GOAL <130: ICD-10-CM

## 2017-10-05 DIAGNOSIS — E78.5 HYPERLIPIDEMIA LDL GOAL <130: ICD-10-CM

## 2017-10-05 DIAGNOSIS — Z13.1 SCREENING FOR DIABETES MELLITUS: ICD-10-CM

## 2017-10-05 DIAGNOSIS — E03.9 HYPOTHYROIDISM, UNSPECIFIED TYPE: ICD-10-CM

## 2017-10-05 DIAGNOSIS — R74.8 ELEVATED AMYLASE: Primary | ICD-10-CM

## 2017-10-05 LAB
AMYLASE SERPL-CCNC: 73 U/L (ref 30–110)
CHOLEST SERPL-MCNC: 175 MG/DL
GLUCOSE SERPL-MCNC: 96 MG/DL (ref 70–99)
HDLC SERPL-MCNC: 43 MG/DL
LDLC SERPL CALC-MCNC: 106 MG/DL
NONHDLC SERPL-MCNC: 132 MG/DL
T4 FREE SERPL-MCNC: 0.86 NG/DL (ref 0.76–1.46)
TRIGL SERPL-MCNC: 131 MG/DL
TSH SERPL DL<=0.005 MIU/L-ACNC: 2.88 MU/L (ref 0.4–4)

## 2017-10-05 PROCEDURE — 36415 COLL VENOUS BLD VENIPUNCTURE: CPT | Performed by: FAMILY MEDICINE

## 2017-10-05 PROCEDURE — 84443 ASSAY THYROID STIM HORMONE: CPT | Performed by: FAMILY MEDICINE

## 2017-10-05 PROCEDURE — 82150 ASSAY OF AMYLASE: CPT | Performed by: PSYCHIATRY & NEUROLOGY

## 2017-10-05 PROCEDURE — 82947 ASSAY GLUCOSE BLOOD QUANT: CPT | Performed by: FAMILY MEDICINE

## 2017-10-05 PROCEDURE — 80061 LIPID PANEL: CPT | Performed by: FAMILY MEDICINE

## 2017-10-05 PROCEDURE — 84439 ASSAY OF FREE THYROXINE: CPT | Performed by: FAMILY MEDICINE

## 2017-10-26 ENCOUNTER — OFFICE VISIT (OUTPATIENT)
Dept: FAMILY MEDICINE | Facility: CLINIC | Age: 42
End: 2017-10-26
Payer: COMMERCIAL

## 2017-10-26 ENCOUNTER — RADIANT APPOINTMENT (OUTPATIENT)
Dept: GENERAL RADIOLOGY | Facility: CLINIC | Age: 42
End: 2017-10-26
Attending: FAMILY MEDICINE
Payer: COMMERCIAL

## 2017-10-26 VITALS
TEMPERATURE: 97.7 F | BODY MASS INDEX: 30.49 KG/M2 | WEIGHT: 183 LBS | HEIGHT: 65 IN | SYSTOLIC BLOOD PRESSURE: 126 MMHG | HEART RATE: 73 BPM | DIASTOLIC BLOOD PRESSURE: 86 MMHG

## 2017-10-26 DIAGNOSIS — R10.32 ABDOMINAL PAIN, LEFT LOWER QUADRANT: ICD-10-CM

## 2017-10-26 DIAGNOSIS — R10.32 ABDOMINAL PAIN, LEFT LOWER QUADRANT: Primary | ICD-10-CM

## 2017-10-26 PROCEDURE — 74020 XR ABDOMEN 2 VW: CPT

## 2017-10-26 PROCEDURE — 99213 OFFICE O/P EST LOW 20 MIN: CPT | Performed by: FAMILY MEDICINE

## 2017-10-26 RX ORDER — HYDROXYZINE PAMOATE 50 MG/1
50 CAPSULE ORAL 2 TIMES DAILY
Qty: 120 CAPSULE | COMMUNITY
Start: 2017-10-26 | End: 2019-09-25

## 2017-10-26 ASSESSMENT — ANXIETY QUESTIONNAIRES
6. BECOMING EASILY ANNOYED OR IRRITABLE: SEVERAL DAYS
5. BEING SO RESTLESS THAT IT IS HARD TO SIT STILL: SEVERAL DAYS
1. FEELING NERVOUS, ANXIOUS, OR ON EDGE: SEVERAL DAYS
3. WORRYING TOO MUCH ABOUT DIFFERENT THINGS: SEVERAL DAYS
7. FEELING AFRAID AS IF SOMETHING AWFUL MIGHT HAPPEN: NOT AT ALL
GAD7 TOTAL SCORE: 5
2. NOT BEING ABLE TO STOP OR CONTROL WORRYING: NOT AT ALL

## 2017-10-26 ASSESSMENT — PATIENT HEALTH QUESTIONNAIRE - PHQ9
SUM OF ALL RESPONSES TO PHQ QUESTIONS 1-9: 2
5. POOR APPETITE OR OVEREATING: SEVERAL DAYS

## 2017-10-26 NOTE — NURSING NOTE
"Chief Complaint   Patient presents with     Abdominal Pain       Initial /86  Pulse 73  Temp 97.7  F (36.5  C) (Tympanic)  Ht 5' 5\" (1.651 m)  Wt 183 lb (83 kg)  BMI 30.45 kg/m2 Estimated body mass index is 30.45 kg/(m^2) as calculated from the following:    Height as of this encounter: 5' 5\" (1.651 m).    Weight as of this encounter: 183 lb (83 kg).  Medication Reconciliation: complete  "

## 2017-10-26 NOTE — PROGRESS NOTES
SUBJECTIVE:   Yusef Alvares is a 42 year old male who presents to clinic today for the following health issues:      ABDOMINAL   PAIN     Onset: 2 weeks agop     Description:   Character: Dull ache and Cramping  Location: left lower quadrant  Radiation: L leg     Intensity: moderate    Progression of Symptoms:  same    Accompanying Signs & Symptoms:  Fever/Chills?: no   Gas/Bloating: no   Nausea: no   Vomitting: no   Diarrhea?: no   Constipation:YES  Dysuria or Hematuria: no    History:   Trauma: no   Previous similar pain: no    Previous tests done: none    Precipitating factors:   Does the pain change with:     Food: no      BM: no     Urination: no     Alleviating factors:  none    Therapies Tried and outcome: none     LMP:  not applicable         Patient comes in with couple of weeks of lower abdominal pain. Patient reports that the pain is dull in nature, localized to the left lower quadrant. Patient had some constipation which he says got better. He denies any associated nausea or vomiting . He denies any fevers or chills. No known aggravating factors. No relieving factors.     Problem list and histories reviewed & adjusted, as indicated.  Additional history: as documented    Patient Active Problem List   Diagnosis     Schizophrenia (H)     Weight gain     Hyperlipidemia LDL goal <130     Acne scarring     Hypothyroidism     TB (tuberculosis) contact     ABDULKADIR (obstructive sleep apnea)     Schizoaffective disorder (H)     CARDIOVASCULAR SCREENING; LDL GOAL LESS THAN 130     Lesions of both ulnar nerves     Right-sided low back pain with right-sided sciatica     Obesity (BMI 30.0-34.9)     Past Surgical History:   Procedure Laterality Date     DENTAL SURGERY      Extraction of abscess tooth     WISDOM ST GUIDEWIRE         Social History   Substance Use Topics     Smoking status: Never Smoker     Smokeless tobacco: Never Used     Alcohol use No     Family History   Problem Relation Age of Onset     Psychotic  Disorder Mother      pre-schizophrenia     Breast Cancer Mother      Respiratory Mother      copd.   Sleep apnea     Cardiovascular Mother      aneurysms x 2     Hypertension Mother      Alcohol/Drug Father      Respiratory Father      copd     Asthma Father      GASTROINTESTINAL DISEASE Father      appendectomy     Alcohol/Drug Maternal Grandmother      cirrhosis of liver     Depression Maternal Grandfather      Arthritis Maternal Grandfather      hip replacement     CANCER Paternal Grandmother      Asthma Brother      Respiratory Brother      copd         Current Outpatient Prescriptions   Medication Sig Dispense Refill     hydrOXYzine (VISTARIL) 50 MG capsule Take 1 capsule (50 mg) by mouth 2 times daily As needed 120 capsule      levothyroxine (SYNTHROID/LEVOTHROID) 50 MCG tablet TAKE 1 TABLET(50 MCG) BY MOUTH DAILY 90 tablet 3     simvastatin (ZOCOR) 40 MG tablet Take 1 tablet (40 mg) by mouth At Bedtime Take with 20 mg dose so that the total dose is 60 mg a day 90 tablet 3     simvastatin (ZOCOR) 20 MG tablet Take 1 tablet (20 mg) by mouth At Bedtime Take with 40 mg for total dose of 60 mg a day. 90 tablet 3     atomoxetine (STRATTERA) 80 MG capsule Take 80 mg by mouth daily       ARIPiprazole (ABILIFY) 15 MG tablet Take 15 mg by mouth daily.       citalopram (CELEXA) 40 MG tablet Take 40 mg by mouth daily.       triamcinolone (KENALOG) 0.1 % paste Take by mouth 2 times daily Until sore is healed (Patient not taking: Reported on 10/26/2017) 5 g 1     naltrexone (DEPADE;REVIA) 50 MG tablet Take 50 mg by mouth daily       triamcinolone (KENALOG) 0.5 % cream Apply sparingly to affected area three times daily to elbows (Patient not taking: Reported on 10/26/2017) 30 g 5     divalproex (DEPAKOTE ER) 500 MG 24 hr tablet Take 500 mg by mouth daily. Taking 1500 mg at night       No Known Allergies  BP Readings from Last 3 Encounters:   10/26/17 126/86   07/14/17 120/64   02/27/17 133/84    Wt Readings from Last 3  "Encounters:   10/26/17 83 kg (183 lb)   07/14/17 87.9 kg (193 lb 12.8 oz)   02/27/17 87.5 kg (193 lb)                  Labs reviewed in EPIC        Reviewed and updated as needed this visit by clinical staffTobacco  Allergies  Med Hx  Surg Hx  Fam Hx  Soc Hx      Reviewed and updated as needed this visit by Provider         ROS:  Constitutional, HEENT, cardiovascular, pulmonary, gi and gu systems are negative, except as otherwise noted.      OBJECTIVE:   /86  Pulse 73  Temp 97.7  F (36.5  C) (Tympanic)  Ht 1.651 m (5' 5\")  Wt 83 kg (183 lb)  BMI 30.45 kg/m2  Body mass index is 30.45 kg/(m^2).  GENERAL: healthy, alert and no distress  EYES: Eyes grossly normal to inspection, PERRL and conjunctivae and sclerae normal  HENT: ear canals and TM's normal, nose and mouth without ulcers or lesions  NECK: no adenopathy, no asymmetry, masses, or scars and thyroid normal to palpation  RESP: lungs clear to auscultation - no rales, rhonchi or wheezes  CV: regular rate and rhythm, normal S1 S2, no S3 or S4, no murmur, click or rub, no peripheral edema and peripheral pulses strong  ABDOMEN: tenderness RLQ, no organomegaly or masses and bowel sounds normal  MS: no gross musculoskeletal defects noted, no edema    Diagnostic Test Results:  Xray -       IMPRESSION: Moderate volume of retained stool. Bowel gas pattern is  nonobstructive. No evidence of urinary tract calculus. No gross free  air or abdominal mass effect.       ASSESSMENT/PLAN:   (R10.32) Abdominal pain, left lower quadrant  (primary encounter diagnosis)  Comment: Patient was reassured, likely due to constipation. Recommend laxatives . Did mention that if his abdominal pain persists consider CT scan.   Plan: XR Abdomen 2 Views        FUTURE APPOINTMENTS:       - Follow-up visit as needed.  Patient Instructions         Thank you for choosing [facility].  You may be receiving a survey in the mail from [employer] regarding your visit today.  Please " take a few minutes to complete and return the survey to let us know how we are doing.      If you have questions or concerns, please contact us via [facility] or you can contact your care team at [phone].    Our Clinic hours are:  Monday 6:40 am  to 7:00 pm  Tuesday -Friday 6:40 am to 5:00 pm    The [place] outpatient lab hours are:  Monday - Friday 6:10 am to 4:45 pm  Saturdays 7:00 am to 11:00 am  Appointments are required, call [phone]    If you have clinical questions after hours or would like to schedule an appointment,  call the clinic at [phone].  Treating Constipation    Constipation is a common and often uncomfortable problem. Constipation means you have bowel movements fewer than 3 times per week, or strain to pass hard, dry stool. It can last a short time. Or it can be a problem that never seems to go away. The good news is that it can often be treated and controlled.  Eat more fiber  One of the best ways to help treat constipation is to increase your fiber intake. You can do this either through diet or by using fiber supplements. Fiber (in whole grains, fruits, and vegetables) adds bulk and absorbs water to soften the stool. This helps the stool pass through the colon more easily. When you increase your fiber intake, do it slowly to avoid side effects such as bloating. Also increase the amount of water that you drink. Eating more of the following foods can add fiber to your diet.    High-fiber cereals    Whole grains, bran, and brown rice    Vegetables such as carrots, broccoli, and greens    Fresh fruits (especially apples, pears, and dried fruits like raisins and apricots)    Nuts and legumes (especially beans such as lentils, kidney beans, and lima beans)  Get physically active  Exercise helps improve the working of your colon which helps ease constipation. Try to get some physical activity every day. If you haven t been active for a while, talk to your healthcare provider before starting  again.  Laxatives  Your healthcare provider may suggest an over-the-counter product to help ease your constipation. He or she may suggest the use of bulk-forming agents or laxatives. The use of laxatives, if used as directed, is common and safe. Follow directions carefully when using them. See your healthcare provider for new-onset constipation, or long-term constipation, to rule out other causes such as medicines or thyroid disease.  Date Last Reviewed: 7/1/2016 2000-2017 [employer]. [address] [place] [address]. All rights reserved. This information is not intended as a substitute for professional medical care. Always follow your healthcare professional's instructions.            Aleida Ayala MD  [facility]

## 2017-10-26 NOTE — PATIENT INSTRUCTIONS
Thank you for choosing [facility].  You may be receiving a survey in the mail from [employer] regarding your visit today.  Please take a few minutes to complete and return the survey to let us know how we are doing.      If you have questions or concerns, please contact us via [employer] or you can contact your care team at [phone].    Our Clinic hours are:  Monday 6:40 am  to 7:00 pm  Tuesday -Friday 6:40 am to 5:00 pm    The [place] outpatient lab hours are:  Monday - Friday 6:10 am to 4:45 pm  Saturdays 7:00 am to 11:00 am  Appointments are required, call [phone]    If you have clinical questions after hours or would like to schedule an appointment,  call the clinic at [phone].  Treating Constipation    Constipation is a common and often uncomfortable problem. Constipation means you have bowel movements fewer than 3 times per week, or strain to pass hard, dry stool. It can last a short time. Or it can be a problem that never seems to go away. The good news is that it can often be treated and controlled.  Eat more fiber  One of the best ways to help treat constipation is to increase your fiber intake. You can do this either through diet or by using fiber supplements. Fiber (in whole grains, fruits, and vegetables) adds bulk and absorbs water to soften the stool. This helps the stool pass through the colon more easily. When you increase your fiber intake, do it slowly to avoid side effects such as bloating. Also increase the amount of water that you drink. Eating more of the following foods can add fiber to your diet.    High-fiber cereals    Whole grains, bran, and brown rice    Vegetables such as carrots, broccoli, and greens    Fresh fruits (especially apples, pears, and dried fruits like raisins and apricots)    Nuts and legumes (especially beans such as lentils, kidney beans, and lima beans)  Get physically active  Exercise helps improve the working of your colon which helps ease  constipation. Try to get some physical activity every day. If you haven t been active for a while, talk to your healthcare provider before starting again.  Laxatives  Your healthcare provider may suggest an over-the-counter product to help ease your constipation. He or she may suggest the use of bulk-forming agents or laxatives. The use of laxatives, if used as directed, is common and safe. Follow directions carefully when using them. See your healthcare provider for new-onset constipation, or long-term constipation, to rule out other causes such as medicines or thyroid disease.  Date Last Reviewed: 7/1/2016 2000-2017 [employer]. [address] [place] [address], [place] [address]. All rights reserved. This information is not intended as a substitute for professional medical care. Always follow your healthcare professional's instructions.

## 2017-10-27 ASSESSMENT — ANXIETY QUESTIONNAIRES: GAD7 TOTAL SCORE: 5

## 2017-11-23 ENCOUNTER — NURSE TRIAGE (OUTPATIENT)
Dept: NURSING | Facility: CLINIC | Age: 42
End: 2017-11-23

## 2017-11-23 NOTE — TELEPHONE ENCOUNTER
"  Reason for Disposition    Leaking stool     \"I am having some mucus leaking with bowel movements. I had an impaction about a mo ago. It took about 4 week altogether before I felt better. Right now I am having bowel movements, but it's a very small amount, then the mucus with odor.\" Denies pain or fever. Denies blood in stools. Gave home care advice and to be seen within 24 hrs. Call back if needed.    Additional Information    Negative: [1] Abdominal pain is main symptom AND [2] adult male    Negative: [1] Abdominal pain is main symptom AND [2] adult female    Negative: Rectal bleeding or blood in stool is main symptom    Negative: Rectal pain or itching is main symptom    Negative: Patient sounds very sick or weak to the triager    Negative: [1] Vomiting AND [2] abdomen looks much more swollen than usual    Negative: [1] Vomiting AND [2] contains bile (green color)    Negative: [1] Constant abdominal pain AND [2] present > 2 hours    Negative: [1] Sudden onset rectal pain (straining, rectal pressure or fullness) AND [2] NOT better after SITZ bath, suppository or enema    Negative: [1] Intermittent mild abdominal pain AND [2] fever    Negative: Abdomen is more swollen than usual    Negative: Last bowel movement (BM) > 4 days ago    Protocols used: CONSTIPATION-ADULT-    "

## 2017-11-27 ENCOUNTER — OFFICE VISIT (OUTPATIENT)
Dept: FAMILY MEDICINE | Facility: CLINIC | Age: 42
End: 2017-11-27
Payer: COMMERCIAL

## 2017-11-27 VITALS
TEMPERATURE: 98.1 F | HEART RATE: 77 BPM | SYSTOLIC BLOOD PRESSURE: 132 MMHG | WEIGHT: 184 LBS | BODY MASS INDEX: 30.66 KG/M2 | DIASTOLIC BLOOD PRESSURE: 87 MMHG | HEIGHT: 65 IN

## 2017-11-27 DIAGNOSIS — R19.5 MUCOUS IN STOOLS: Primary | ICD-10-CM

## 2017-11-27 PROCEDURE — 99213 OFFICE O/P EST LOW 20 MIN: CPT | Performed by: FAMILY MEDICINE

## 2017-11-27 ASSESSMENT — PATIENT HEALTH QUESTIONNAIRE - PHQ9
5. POOR APPETITE OR OVEREATING: NOT AT ALL
SUM OF ALL RESPONSES TO PHQ QUESTIONS 1-9: 3

## 2017-11-27 ASSESSMENT — ANXIETY QUESTIONNAIRES
GAD7 TOTAL SCORE: 1
5. BEING SO RESTLESS THAT IT IS HARD TO SIT STILL: SEVERAL DAYS
6. BECOMING EASILY ANNOYED OR IRRITABLE: NOT AT ALL
3. WORRYING TOO MUCH ABOUT DIFFERENT THINGS: NOT AT ALL
2. NOT BEING ABLE TO STOP OR CONTROL WORRYING: NOT AT ALL
7. FEELING AFRAID AS IF SOMETHING AWFUL MIGHT HAPPEN: NOT AT ALL
1. FEELING NERVOUS, ANXIOUS, OR ON EDGE: NOT AT ALL

## 2017-11-27 NOTE — MR AVS SNAPSHOT
After Visit Summary   11/27/2017    Yusef Alvares    MRN: [id number]           Patient Information     Date Of Birth          1975        Visit Information        Provider Department      11/27/2017 8:00 AM Aleida Ayala MD [facility]        Care Instructions          Thank you for choosing [facility].  You may be receiving a survey in the mail from [facility] regarding your visit today.  Please take a few minutes to complete and return the survey to let us know how we are doing.      If you have questions or concerns, please contact us via [employer] or you can contact your care team at [phone].    Our Clinic hours are:  Monday 6:40 am  to 7:00 pm  Tuesday -Friday 6:40 am to 5:00 pm    The [place] outpatient lab hours are:  Monday - Friday 6:10 am to 4:45 pm  Saturdays 7:00 am to 11:00 am  Appointments are required, call [phone]    If you have clinical questions after hours or would like to schedule an appointment,  call the clinic at [phone].          Follow-ups after your visit        Your next 10 appointments already scheduled     Nov 27, 2017  8:00 AM CST   SHORT with Aleida Ayala MD   [facility]    [address] [address] [phone]              Who to contact     If you have questions or need follow up information about today's clinic visit or your schedule please contact [facility] directly at [phone].  Normal or non-critical lab and imaging results will be communicated to you by [employer], letter or phone within 4 business days after the clinic has received the results. If you do not hear from us within 7 days, please contact the clinic through [employer] or phone. If you have a critical or abnormal lab result, we will notify you by phone as soon as possible.  Submit refill requests through [employer] or call your pharmacy and they will forward the refill  "request to us. Please allow 3 business days for your refill to be completed.          Additional Information About Your Visit        MyChart Information     "[employer] lets you send messages to your doctor, view your test results, renew your prescriptions, schedule appointments and more. To sign up, go to [email or web address] . Click on \"Log in\" on the left side of the screen, which will take you to the Welcome page. Then click on \"Sign up Now\" on the right side of the page.     You will be asked to enter the access code listed below, as well as some personal information. Please follow the directions to create your username and password.     Your access code is: [email or web address]  Expires: 2018  8:57 AM     Your access code will  in 90 days. If you need help or a new code, please call your [facility] clinic or [phone].        Care EveryWhere ID     This is your Care EveryWhere ID. This could be used by other organizations to access your [facility] medical records  [id number]        Your Vitals Were     Pulse Temperature Height BMI (Body Mass Index)          77 98.1  F (36.7  C) (Tympanic) 5' 5\" (1.651 m) 30.62 kg/m2         Blood Pressure from Last 3 Encounters:   17 132/87   10/26/17 126/86   17 120/64    Weight from Last 3 Encounters:   17 184 lb (83.5 kg)   10/26/17 183 lb (83 kg)   17 193 lb 12.8 oz (87.9 kg)              Today, you had the following     No orders found for display       Primary Care Provider Office Phone # Fax #    Yusef Katz [phone] [address] [facility] [address]        Equal Access to Services     ELIZABETH KISER : Ceasar Montiel, wadelfin amor, qaybta kaalmazaid rees, vince burks. So [facility] [phone].    ATENCIÓN: Si habla español, tiene a lima disposición servicios gratuitos de asistencia lingüística. Peter davila [phone].    We comply with applicable federal civil rights " laws and [place] laws. We do not discriminate on the basis of race, color, national origin, age, disability, sex, sexual orientation, or gender identity.            Thank you!     Thank you for choosing [facility]  for your care. Our goal is always to provide you with excellent care. Hearing back from our patients is one way we can continue to improve our services. Please take a few minutes to complete the written survey that you may receive in the mail after your visit with us. Thank you!             Your Updated Medication List - Protect others around you: Learn how to safely use, store and throw away your medicines at www.disposemymeds.org.          This list is accurate as of: 11/27/17  7:59 AM.  Always use your most recent med list.                   Brand Name Dispense Instructions for use Diagnosis    ABILIFY 15 MG tablet   Generic drug:  ARIPiprazole      Take 15 mg by mouth daily.        citalopram 40 MG tablet    celeXA     Take 40 mg by mouth daily.        DEPAKOTE  MG 24 hr tablet   Generic drug:  divalproex      Take 500 mg by mouth daily. Taking 1500 mg at night        hydrOXYzine 50 MG capsule    VISTARIL    120 capsule    Take 1 capsule (50 mg) by mouth 2 times daily As needed        levothyroxine 50 MCG tablet    SYNTHROID/LEVOTHROID    90 tablet    TAKE 1 TABLET(50 MCG) BY MOUTH DAILY    Hypothyroidism       naltrexone 50 MG tablet    DEPADE;REVIA     Take 50 mg by mouth daily        * simvastatin 40 MG tablet    ZOCOR    90 tablet    Take 1 tablet (40 mg) by mouth At Bedtime Take with 20 mg dose so that the total dose is 60 mg a day    Hyperlipidemia LDL goal <130       * simvastatin 20 MG tablet    ZOCOR    90 tablet    Take 1 tablet (20 mg) by mouth At Bedtime Take with 40 mg for total dose of 60 mg a day.    Hyperlipidemia LDL goal <130       STRATTERA 80 MG capsule   Generic drug:  atomoxetine      Take 80 mg by mouth daily        triamcinolone 0.1 % paste    KENALOG    5  g    Take by mouth 2 times daily Until sore is healed    Canker sore       triamcinolone 0.5 % cream    KENALOG    30 g    Apply sparingly to affected area three times daily to elbows    Eczema       * Notice:  This list has 2 medication(s) that are the same as other medications prescribed for you. Read the directions carefully, and ask your doctor or other care provider to review them with you.

## 2017-11-27 NOTE — PATIENT INSTRUCTIONS
Thank you for choosing [facility].  You may be receiving a survey in the mail from [facility] regarding your visit today.  Please take a few minutes to complete and return the survey to let us know how we are doing.      If you have questions or concerns, please contact us via [facility] or you can contact your care team at [phone].    Our Clinic hours are:  Monday 6:40 am  to 7:00 pm  Tuesday -Friday 6:40 am to 5:00 pm    The [place] outpatient lab hours are:  Monday - Friday 6:10 am to 4:45 pm  Saturdays 7:00 am to 11:00 am  Appointments are required, call [phone]    If you have clinical questions after hours or would like to schedule an appointment,  call the clinic at [phone].

## 2017-11-27 NOTE — NURSING NOTE
"Chief Complaint   Patient presents with     Bowel Problems       Initial /87 (BP Location: Left arm, Cuff Size: Adult Regular)  Pulse 77  Temp 98.1  F (36.7  C) (Tympanic)  Ht 5' 5\" (1.651 m)  Wt 184 lb (83.5 kg)  BMI 30.62 kg/m2 Estimated body mass index is 30.62 kg/(m^2) as calculated from the following:    Height as of this encounter: 5' 5\" (1.651 m).    Weight as of this encounter: 184 lb (83.5 kg).  Medication Reconciliation: complete  "

## 2017-11-27 NOTE — PROGRESS NOTES
SUBJECTIVE:   Yusef Alvares is a 42 year old male who presents to clinic today for the following health issues:  Mucus is gone now but would just like to be checked     Concern - mucus in stool   Onset: 5 days ago     Description:   Patient had constipation 4 weeks ago for about 3-4 week had taking some laxatives. About 5 days ago noticed some mucus in stool on last about 3 days is completely gone now      Intensity: mild    Progression of Symptoms:  improving    Accompanying Signs & Symptoms:  Constipation     Previous history of similar problem:   None     Precipitating factors:   Worsened by: none    Alleviating factors:  Improved by: none     Therapies Tried and outcome: none       Patient is a 42 yr old male here for mucous in his stool,he noticed this for about three days in a row. This is all cleared now. Patient reports that he had no blood in his stool. No abdominal cramping . No other complaints. He really came for some reassurance as the mucous has stopped.     Problem list and histories reviewed & adjusted, as indicated.  Additional history: as documented    Patient Active Problem List   Diagnosis     Schizophrenia (H)     Weight gain     Hyperlipidemia LDL goal <130     Acne scarring     Hypothyroidism     TB (tuberculosis) contact     ABDULKADIR (obstructive sleep apnea)     Schizoaffective disorder (H)     CARDIOVASCULAR SCREENING; LDL GOAL LESS THAN 130     Lesions of both ulnar nerves     Right-sided low back pain with right-sided sciatica     Obesity (BMI 30.0-34.9)     Past Surgical History:   Procedure Laterality Date     DENTAL SURGERY      Extraction of abscess tooth     WISDOM ST GUIDEWIRE         Social History   Substance Use Topics     Smoking status: Never Smoker     Smokeless tobacco: Never Used     Alcohol use No     Family History   Problem Relation Age of Onset     Psychotic Disorder Mother      pre-schizophrenia     Breast Cancer Mother      Respiratory Mother      copd.   Sleep apnea      Cardiovascular Mother      aneurysms x 2     Hypertension Mother      Alcohol/Drug Father      Respiratory Father      copd     Asthma Father      GASTROINTESTINAL DISEASE Father      appendectomy     Alcohol/Drug Maternal Grandmother      cirrhosis of liver     Depression Maternal Grandfather      Arthritis Maternal Grandfather      hip replacement     CANCER Paternal Grandmother      Asthma Brother      Respiratory Brother      copd         Current Outpatient Prescriptions   Medication Sig Dispense Refill     hydrOXYzine (VISTARIL) 50 MG capsule Take 1 capsule (50 mg) by mouth 2 times daily As needed 120 capsule      levothyroxine (SYNTHROID/LEVOTHROID) 50 MCG tablet TAKE 1 TABLET(50 MCG) BY MOUTH DAILY 90 tablet 3     simvastatin (ZOCOR) 40 MG tablet Take 1 tablet (40 mg) by mouth At Bedtime Take with 20 mg dose so that the total dose is 60 mg a day 90 tablet 3     simvastatin (ZOCOR) 20 MG tablet Take 1 tablet (20 mg) by mouth At Bedtime Take with 40 mg for total dose of 60 mg a day. 90 tablet 3     atomoxetine (STRATTERA) 80 MG capsule Take 80 mg by mouth daily       naltrexone (DEPADE;REVIA) 50 MG tablet Take 50 mg by mouth daily       ARIPiprazole (ABILIFY) 15 MG tablet Take 15 mg by mouth daily.       citalopram (CELEXA) 40 MG tablet Take 40 mg by mouth daily.       triamcinolone (KENALOG) 0.1 % paste Take by mouth 2 times daily Until sore is healed (Patient not taking: Reported on 10/26/2017) 5 g 1     triamcinolone (KENALOG) 0.5 % cream Apply sparingly to affected area three times daily to elbows (Patient not taking: Reported on 10/26/2017) 30 g 5     divalproex (DEPAKOTE ER) 500 MG 24 hr tablet Take 500 mg by mouth daily. Taking 1500 mg at night       No Known Allergies  BP Readings from Last 3 Encounters:   11/27/17 132/87   10/26/17 126/86   07/14/17 120/64    Wt Readings from Last 3 Encounters:   11/27/17 184 lb (83.5 kg)   10/26/17 183 lb (83 kg)   07/14/17 193 lb 12.8 oz (87.9 kg)                 "  Labs reviewed in EPIC        Reviewed and updated as needed this visit by clinical staffTobacco  Allergies  Med Hx  Surg Hx  Fam Hx  Soc Hx      Reviewed and updated as needed this visit by Provider         ROS:  Constitutional, HEENT, cardiovascular, pulmonary, gi and gu systems are negative, except as otherwise noted.      OBJECTIVE:   /87 (BP Location: Left arm, Cuff Size: Adult Regular)  Pulse 77  Temp 98.1  F (36.7  C) (Tympanic)  Ht 5' 5\" (1.651 m)  Wt 184 lb (83.5 kg)  BMI 30.62 kg/m2  Body mass index is 30.62 kg/(m^2).  GENERAL: healthy, alert and no distress  RECTAL (male): Inspection , no blood or mucous seen   MS: no gross musculoskeletal defects noted, no edema    Diagnostic Test Results:  none     ASSESSMENT/PLAN:   1. Mucous in stools  Patient was reassured, asked to watch out for this , if it reoccurs I recommended a stool culture. Patient voised understanding of this .      FUTURE APPOINTMENTS:       - Follow-up visit as needed    Aleida Ayala MD  [facility]  "

## 2017-11-28 ASSESSMENT — ANXIETY QUESTIONNAIRES: GAD7 TOTAL SCORE: 1

## 2018-04-24 DIAGNOSIS — E78.5 HYPERLIPIDEMIA LDL GOAL <130: ICD-10-CM

## 2018-04-25 RX ORDER — SIMVASTATIN 20 MG
TABLET ORAL
Qty: 90 TABLET | Refills: 0 | Status: SHIPPED | OUTPATIENT
Start: 2018-04-25 | End: 2018-07-30

## 2018-04-25 NOTE — TELEPHONE ENCOUNTER
"Requested Prescriptions   Pending Prescriptions Disp Refills     simvastatin (ZOCOR) 20 MG tablet [Pharmacy Med Name: SIMVASTATIN 20MG TABLETS]  Last Written Prescription Date:  04/04/17  Last Fill Quantity: 90,  # refills: 3   Last office visit: 11/27/2017 with prescribing provider:  11/27/17   Future Office Visit:   90 tablet 0     Sig: TAKE 1 TABLET BY MOUTH AT BEDTIME WITH A 40 MG TABLET FOR A TOTAL DAILY DOSE OF 60MG    Statins Protocol Passed    4/24/2018  5:09 PM       Passed - LDL on file in past 12 months    Recent Labs   Lab Test  10/05/17   0920   LDL  106*          Passed - No abnormal creatine kinase in past 12 months    No lab results found.        Passed - Recent (12 mo) or future (30 days) visit within the authorizing provider's specialty    Patient had office visit in the last 12 months or has a visit in the next 30 days with authorizing provider or within the authorizing provider's specialty.  See \"Patient Info\" tab in inbasket, or \"Choose Columns\" in Meds & Orders section of the refill encounter.         Passed - Patient is age 18 or older          "

## 2018-05-30 DIAGNOSIS — E78.5 HYPERLIPIDEMIA LDL GOAL <130: ICD-10-CM

## 2018-05-30 NOTE — TELEPHONE ENCOUNTER
"Requested Prescriptions   Pending Prescriptions Disp Refills     simvastatin (ZOCOR) 40 MG tablet [Pharmacy Med Name: SIMVASTATIN 40MG TABLETS]  Last Written Prescription Date:  04/04/2017  Last Fill Quantity: 90,  # refills: 3   Last office visit: 11/27/2017 with prescribing provider:  Lucy   Future Office Visit:     90 tablet 0     Sig: TAKE 1 TABLET BY MOUTH DAILY AT BEDTIME WITH A 20MG TABLET FOR A TOTAL DAILY DOSE OF 60MG    Statins Protocol Passed    5/30/2018 11:31 AM       Passed - LDL on file in past 12 months    Recent Labs   Lab Test  10/05/17   0920   LDL  106*            Passed - No abnormal creatine kinase in past 12 months    No lab results found.            Passed - Recent (12 mo) or future (30 days) visit within the authorizing provider's specialty    Patient had office visit in the last 12 months or has a visit in the next 30 days with authorizing provider or within the authorizing provider's specialty.  See \"Patient Info\" tab in inbasket, or \"Choose Columns\" in Meds & Orders section of the refill encounter.           Passed - Patient is age 18 or older        Saroj Avalos RT (R)    "

## 2018-05-31 RX ORDER — SIMVASTATIN 40 MG
TABLET ORAL
Qty: 90 TABLET | Refills: 1 | Status: SHIPPED | OUTPATIENT
Start: 2018-05-31 | End: 2018-08-13

## 2018-05-31 NOTE — TELEPHONE ENCOUNTER
Prescription approved per [facility].  Jeri GILMAN RN

## 2018-07-23 ENCOUNTER — TELEPHONE (OUTPATIENT)
Dept: SLEEP MEDICINE | Facility: CLINIC | Age: 43
End: 2018-07-23

## 2018-07-23 NOTE — TELEPHONE ENCOUNTER
Reason for call:  Appointment   Requested Provider:     PCP: [unfilled]    Reason for visit: sleep follow up    Duration of symptoms: n/a    Have you been treated for this in the past? Yes    Additional comments: would like to be seen asap cpap machine is down.      Phone number to reach patient:  Cell number on file:    Telephone Information:   Mobile [phone]       Best Time:  anytime    Can we leave a detailed message on this number?  YES

## 2018-07-27 ENCOUNTER — OFFICE VISIT (OUTPATIENT)
Dept: SLEEP MEDICINE | Facility: CLINIC | Age: 43
End: 2018-07-27
Payer: COMMERCIAL

## 2018-07-27 VITALS
BODY MASS INDEX: 31.69 KG/M2 | WEIGHT: 190.2 LBS | SYSTOLIC BLOOD PRESSURE: 128 MMHG | DIASTOLIC BLOOD PRESSURE: 86 MMHG | HEIGHT: 65 IN | OXYGEN SATURATION: 93 % | HEART RATE: 74 BPM

## 2018-07-27 DIAGNOSIS — G47.33 OSA (OBSTRUCTIVE SLEEP APNEA): Primary | ICD-10-CM

## 2018-07-27 PROCEDURE — 99214 OFFICE O/P EST MOD 30 MIN: CPT | Performed by: FAMILY MEDICINE

## 2018-07-27 NOTE — PATIENT INSTRUCTIONS

## 2018-07-27 NOTE — MR AVS SNAPSHOT
After Visit Summary   7/27/2018    Yusef Alvares    MRN: [id number]           Patient Information     Date Of Birth          1975        Visit Information        Provider Department      7/27/2018 8:00 AM Shay Cummins MD [facility]        Today's Diagnoses     ABDULKADIR (obstructive sleep apnea)    -  1      Care Instructions      Your BMI is Body mass index is 31.9 kg/(m^2).  Weight management is a personal decision.  If you are interested in exploring weight loss strategies, the following discussion covers the approaches that may be successful. Body mass index (BMI) is one way to tell whether you are at a healthy weight, overweight, or obese. It measures your weight in relation to your height.  A BMI of 18.5 to 24.9 is in the healthy range. A person with a BMI of 25 to 29.9 is considered overweight, and someone with a BMI of 30 or greater is considered obese. More than two-thirds of American adults are considered overweight or obese.  Being overweight or obese increases the risk for further weight gain. Excess weight may lead to heart disease and diabetes.  Creating and following plans for healthy eating and physical activity may help you improve your health.  Weight control is part of healthy lifestyle and includes exercise, emotional health, and healthy eating habits. Careful eating habits lifelong are the mainstay of weight control. Though there are significant health benefits from weight loss, long-term weight loss with diet alone may be very difficult to achieve- studies show long-term success with dietary management in less than 10% of people. Attaining a healthy weight may be especially difficult to achieve in those with severe obesity. In some cases, medications, devices and surgical management might be considered.  What can you do?  If you are overweight or obese and are interested in methods for weight loss, you should discuss this with your provider.     Consider  reducing daily calorie intake by 500 calories.     Keep a food journal.     Avoiding skipping meals, consider cutting portions instead.    Diet combined with exercise helps maintain muscle while optimizing fat loss. Strength training is particularly important for building and maintaining muscle mass. Exercise helps reduce stress, increase energy, and improves fitness. Increasing exercise without diet control, however, may not burn enough calories to loose weight.       Start walking three days a week 10-20 minutes at a time    Work towards walking thirty minutes five days a week     Eventually, increase the speed of your walking for 1-2 minutes at time    In addition, we recommend that you review healthy lifestyles and methods for weight loss available through the National Institutes of Health patient information sites:  http://win.niddk.nih.gov/publications/index.htm    And look into health and wellness programs that may be available through your health insurance provider, employer, local community center, or kiel club.    Weight management plan: Patient was referred to their PCP to discuss a diet and exercise plan.         Your Body mass index is 31.9 kg/(m^2).  Weight management is a personal decision.  If you are interested in exploring weight loss strategies, the following discussion covers the approaches that may be successful. Body mass index (BMI) is one way to tell whether you are at a healthy weight, overweight, or obese. It measures your weight in relation to your height.  A BMI of 18.5 to 24.9 is in the healthy range. A person with a BMI of 25 to 29.9 is considered overweight, and someone with a BMI of 30 or greater is considered obese. More than two-thirds of American adults are considered overweight or obese.  Being overweight or obese increases the risk for further weight gain. Excess weight may lead to heart disease and diabetes.  Creating and following plans for healthy eating and physical activity  may help you improve your health.  Weight control is part of healthy lifestyle and includes exercise, emotional health, and healthy eating habits. Careful eating habits lifelong are the mainstay of weight control. Though there are significant health benefits from weight loss, long-term weight loss with diet alone may be very difficult to achieve- studies show long-term success with dietary management in less than 10% of people. Attaining a healthy weight may be especially difficult to achieve in those with severe obesity. In some cases, medications, devices and surgical management might be considered.  What can you do?  If you are overweight or obese and are interested in methods for weight loss, you should discuss this with your provider.     Consider reducing daily calorie intake by 500 calories.     Keep a food journal.     Avoiding skipping meals, consider cutting portions instead.    Diet combined with exercise helps maintain muscle while optimizing fat loss. Strength training is particularly important for building and maintaining muscle mass. Exercise helps reduce stress, increase energy, and improves fitness. Increasing exercise without diet control, however, may not burn enough calories to loose weight.       Start walking three days a week 10-20 minutes at a time    Work towards walking thirty minutes five days a week     Eventually, increase the speed of your walking for 1-2 minutes at time    In addition, we recommend that you review healthy lifestyles and methods for weight loss available through the National Institutes of Health patient information sites:  http://win.niddk.nih.gov/publications/index.htm    And look into health and wellness programs that may be available through your health insurance provider, employer, local community center, or kiel club.    Weight management plan: Patient was referred to their PCP to discuss a diet and exercise plan.          Follow-ups after your visit       "  Follow-up notes from your care team     Return in about 4 weeks (around 8/24/2018), or if symptoms worsen or fail to improve.      Your next 10 appointments already scheduled     Jul 27, 2018 10:00 AM CDT   PAP SETUP REPLACEMENT with [facility]   [facility] ([facility]    [address]  [facility] [address]   [phone]            Aug 03, 2018  8:30 AM CDT   PAP SETUP REPLACEMENT with [facility]   [facility] ([facility])    [address]  [facility] [address]   [phone]              Who to contact     If you have questions or need follow up information about today's clinic visit or your schedule please contact [facility] directly at [phone].  Normal or non-critical lab and imaging results will be communicated to you by MyChart, letter or phone within 4 business days after the clinic has received the results. If you do not hear from us within 7 days, please contact the clinic through [employer] or phone. If you have a critical or abnormal lab result, we will notify you by phone as soon as possible.  Submit refill requests through [employer] or call your pharmacy and they will forward the refill request to us. Please allow 3 business days for your refill to be completed.          Additional Information About Your Visit        MyChart Information     [employer] lets you send messages to your doctor, view your test results, renew your prescriptions, schedule appointments and more. To sign up, go to [email or web address] . Click on \"Log in\" on the left side of the screen, which will take you to the Welcome page. Then click on \"Sign up Now\" on the right side of the page.     You will be asked to enter the access code listed below, as well as some personal information. Please follow the directions to create your username and password.     Your access code is: [email or web address]  Expires: 10/25/2018  9:32 " "AM     Your access code will  in 90 days. If you need help or a new code, please call your [facility] clinic or [phone].        Care EveryWhere ID     This is your Care EveryWhere ID. This could be used by other organizations to access your [facility] medical records  [id number]        Your Vitals Were     Pulse Height Pulse Oximetry BMI (Body Mass Index)          74 1.645 m (5' 4.75\") 93% 31.9 kg/m2         Blood Pressure from Last 3 Encounters:   18 128/86   17 132/87   10/26/17 126/86    Weight from Last 3 Encounters:   18 86.3 kg (190 lb 3.2 oz)   17 83.5 kg (184 lb)   10/26/17 83 kg (183 lb)              We Performed the Following     Comprehensive DME        Primary Care Provider Office Phone # Fax #    Yusef Katz [phone] [address] [address]        Equal Access to Services     PURA KISER : Hadii kathy ku hadasho Soomaali, waaxda luqadaha, qaybta kaalmada adeegyada, waxay elizabeth benitez . So [facility] [phone].    ATENCIÓN: Si habla español, tiene a lima disposición servicios gratuitos de asistencia lingüística. Llame al [phone].    We comply with applicable federal civil rights laws and [place] laws. We do not discriminate on the basis of race, color, national origin, age, disability, sex, sexual orientation, or gender identity.            Thank you!     Thank you for choosing [facility]  for your care. Our goal is always to provide you with excellent care. Hearing back from our patients is one way we can continue to improve our services. Please take a few minutes to complete the written survey that you may receive in the mail after your visit with us. Thank you!             Your Updated Medication List - Protect others around you: Learn how to safely use, store and throw away your medicines at www.disposemymeds.org.          This list is accurate as of 18  9:32 AM.  Always use " your most recent med list.                   Brand Name Dispense Instructions for use Diagnosis    ABILIFY 15 MG tablet   Generic drug:  ARIPiprazole      Take 15 mg by mouth daily.        citalopram 40 MG tablet    celeXA     Take 40 mg by mouth daily.        DEPAKOTE  MG 24 hr tablet   Generic drug:  divalproex sodium extended-release      Take 500 mg by mouth daily. Taking 1500 mg at night        hydrOXYzine 50 MG capsule    VISTARIL    120 capsule    Take 1 capsule (50 mg) by mouth 2 times daily As needed        levothyroxine 50 MCG tablet    SYNTHROID/LEVOTHROID    90 tablet    TAKE 1 TABLET(50 MCG) BY MOUTH DAILY    Hypothyroidism       naltrexone 50 MG tablet    DEPADE;REVIA     Take 50 mg by mouth daily        * simvastatin 20 MG tablet    ZOCOR    90 tablet    TAKE 1 TABLET BY MOUTH AT BEDTIME WITH A 40 MG TABLET FOR A TOTAL DAILY DOSE OF 60MG    Hyperlipidemia LDL goal <130       * simvastatin 40 MG tablet    ZOCOR    90 tablet    TAKE 1 TABLET BY MOUTH DAILY AT BEDTIME WITH A 20MG TABLET FOR A TOTAL DAILY DOSE OF 60MG    Hyperlipidemia LDL goal <130       STRATTERA 80 MG capsule   Generic drug:  atomoxetine      Take 80 mg by mouth daily        triamcinolone 0.1 % paste    KENALOG    5 g    Take by mouth 2 times daily Until sore is healed    Canker sore       triamcinolone 0.5 % cream    KENALOG    30 g    Apply sparingly to affected area three times daily to elbows    Eczema       * Notice:  This list has 2 medication(s) that are the same as other medications prescribed for you. Read the directions carefully, and ask your doctor or other care provider to review them with you.

## 2018-07-27 NOTE — PROGRESS NOTES
Obstructive Sleep Apnea - PAP Follow-Up Visit:    Chief Complaint   Patient presents with     CPAP Follow Up     would like replacement machine. His current machine doesn't turn on.        Yusef Alvares comes in today for annual follow-up of mild to moderate ABDULKADIR (7/30/2008 with RDI 24, AHI 13.6, angelita desat 87%) treated with auto-titrate CPAP 10-15 cm H2O.  Significant co-morbidities of schizophrenia NOS vs schizoaffective disorder NOS.    2/27/2017 - Overall, he feels the CPAP is going well.  Feels more awake and alert during the day.  His main sleep concern today is a very irregular sleep wake pattern.  Seems to stem from limited constraints on time, so will nap off and on during the day and then be up for a majority of the night.  This does impact his CPAP usage.  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  Denies any significant changes to his psychiatric medications, is prescribed Zolpidem PRN, but has largely not taken it due to concerns of addiction.  CPAP download from past 6 months with CPAP appearing to be on set pressure 12 cm H2O, though lists average pressure of 11.2 cm H2O.  AHI 5.  Average daily usage of 3:14.  A/P to continue CPAP at 12 cm H2O.    Today - Returns for follow-up, with CPAP stopped working ~2 weeks ago.  Will not power on.  He does feel his sleep has not done well since being unable to use his CPAP.  Denies other sleep concerns today.       Prior sleep studies:  7/30/2008 - PSG with RDI 24, AHI 13.6, angelita desat 87%  8/10/2008 - CPAP titration PSG with CPAP 7 optimal  7/9/2014 - CPAP titration PSG with weight 198 lbs, CPAP 11-12 optimal  1/7/2015 - HST with AHI 19.9, angelita desat 81%, question of mild sustained hypoxemia.  Technically a limited study due to TST of ~3.5 hours.    Problem List:  Patient Active Problem List    Diagnosis Date Noted     Schizophrenia (H) 03/07/2011     Priority: High     Obesity (BMI 30.0-34.9)  "03/21/2017     Priority: Medium     Lesions of both ulnar nerves 11/02/2015     Priority: Medium     Right-sided low back pain with right-sided sciatica 11/02/2015     Priority: Medium     CARDIOVASCULAR SCREENING; LDL GOAL LESS THAN 130 10/30/2015     Priority: Medium     Schizoaffective disorder (H) 12/09/2014     Priority: Medium     Problem list name updated by automated process. Provider to review  Diagnosis updated by automated process. Provider to review and confirm.       ABDULKADIR (obstructive sleep apnea) 10/10/2013     Priority: Medium     [place] 8/10/2008  CPAP titration 7cmH2O       TB (tuberculosis) contact 02/20/2013     Priority: Medium     INH treatment 2000    Dentist at [facility] need negative mantoux or chest xray before they will do work   Phone [phone] fax# [phone]       Hypothyroidism 01/16/2012     Priority: Medium     Weight gain 03/07/2011     Priority: Medium     Hyperlipidemia LDL goal <130 03/07/2011     Priority: Medium     Acne scarring 03/07/2011     Priority: Medium          /86  Pulse 74  Ht 1.645 m (5' 4.75\")  Wt 86.3 kg (190 lb 3.2 oz)  SpO2 93%  BMI 31.9 kg/m2    Impression/Plan:    1.)  Mild to moderate ABDULKADIR (7/30/2008 with RDI 24, AHI 13.6, angelita desat 87%) treated with auto-titrate CPAP 10-15 cm H2O.      - Significant co-morbidities of schizophrenia NOS vs schizoaffective disorder NOS.   - Will arrange replacement CPAP at 12 cm H2O.   - Responds well to positive feedback and assistance in a non-judgmental manner during the compliance process.     Yusef Alvares will follow up in about 1 month(s).     Twenty-five minutes spent with patient, all of which were spent face-to-face counseling, consulting, coordinating plan of care.      Shay Cummins MD, MD    CC:  Yusef Katz  "

## 2018-07-30 DIAGNOSIS — E78.5 HYPERLIPIDEMIA LDL GOAL <130: ICD-10-CM

## 2018-07-30 DIAGNOSIS — E03.9 HYPOTHYROIDISM: ICD-10-CM

## 2018-07-30 NOTE — TELEPHONE ENCOUNTER
"Requested Prescriptions   Pending Prescriptions Disp Refills     levothyroxine (SYNTHROID/LEVOTHROID) 50 MCG tablet [Pharmacy Med Name: LEVOTHYROXINE 0.05MG (50MCG) TAB]  Last Written Prescription Date:  07/18/17  Last Fill Quantity: 90,  # refills: 3   Last office visit: 11/27/2017 with prescribing provider:  11/27/17   Future Office Visit:     90 tablet 0     Sig: TAKE 1 TABLET(50 MCG) BY MOUTH DAILY    Thyroid Protocol Passed    7/30/2018  2:56 PM       Passed - Patient is 12 years or older       Passed - Recent (12 mo) or future (30 days) visit within the authorizing provider's specialty    Patient had office visit in the last 12 months or has a visit in the next 30 days with authorizing provider or within the authorizing provider's specialty.  See \"Patient Info\" tab in inbasket, or \"Choose Columns\" in Meds & Orders section of the refill encounter.           Passed - Normal TSH on file in past 12 months    Recent Labs   Lab Test  10/05/17   0920   TSH  2.88           simvastatin (ZOCOR) 20 MG tablet [Pharmacy Med Name: SIMVASTATIN 20MG TABLETS]  Last Written Prescription Date:  04/25/18  Last Fill Quantity: 90,  # refills: 0   Last office visit: 11/27/2017 with prescribing provider:  11/27/17   Future Office Visit:   Next 5 appointments (look out 90 days)     Aug 13, 2018 11:20 AM CDT   [facility]    [address] [facility] [address]   [phone]               90 tablet 0     Sig: TAKE 1 TABLET BY MOUTH AT BEDTIME WITH A 40 MG TABLET FOR A TOTAL DAILY DOSE OF 60MG    Statins Protocol Passed    7/30/2018  2:56 PM       Passed - LDL on file in past 12 months    Recent Labs   Lab Test  10/05/17   0920   LDL  106*          Passed - No abnormal creatine kinase in past 12 months    No lab results found.        Passed - Recent (12 mo) or future (30 days) visit within the authorizing provider's specialty    Patient had office " "visit in the last 12 months or has a visit in the next 30 days with authorizing provider or within the authorizing provider's specialty.  See \"Patient Info\" tab in inbasket, or \"Choose Columns\" in Meds & Orders section of the refill encounter.           Passed - Patient is age 18 or older          "

## 2018-07-31 RX ORDER — LEVOTHYROXINE SODIUM 50 UG/1
TABLET ORAL
Qty: 90 TABLET | Refills: 0 | Status: SHIPPED | OUTPATIENT
Start: 2018-07-31 | End: 2018-08-13

## 2018-07-31 RX ORDER — SIMVASTATIN 20 MG
TABLET ORAL
Qty: 90 TABLET | Refills: 0 | Status: SHIPPED | OUTPATIENT
Start: 2018-07-31 | End: 2018-08-13

## 2018-07-31 NOTE — TELEPHONE ENCOUNTER
Prescription approved per [facility] Refill Protocol.  Kaleigh Harmon RNC

## 2018-08-03 ENCOUNTER — DOCUMENTATION ONLY (OUTPATIENT)
Dept: SLEEP MEDICINE | Facility: CLINIC | Age: 43
End: 2018-08-03
Payer: COMMERCIAL

## 2018-08-03 NOTE — PROGRESS NOTES
Patient was offered choice of vendor and chose [facility].  Patient Yusef Alvares was set up at [facility]  on August 3, 2018. Patient received a Resmed AirSense 10 Auto. Pressures were set at 12 cm H2O.   Patient s ramp is 6 cm H2O for Auto and FLEX/EPR is EPR, 2.  Patient received a [employer] Mask name: DREAMWEAR  Nasal mask Size Medium, heated tubing and heated humidifier.  Patient is not enrolled in the [facility] Program and does need to meet compliance. Patient has a follow up on TBD.    Holly Aguero

## 2018-08-13 ENCOUNTER — OFFICE VISIT (OUTPATIENT)
Dept: FAMILY MEDICINE | Facility: CLINIC | Age: 43
End: 2018-08-13
Payer: COMMERCIAL

## 2018-08-13 VITALS
WEIGHT: 191.2 LBS | TEMPERATURE: 98.8 F | HEART RATE: 68 BPM | DIASTOLIC BLOOD PRESSURE: 82 MMHG | BODY MASS INDEX: 31.86 KG/M2 | SYSTOLIC BLOOD PRESSURE: 124 MMHG | HEIGHT: 65 IN

## 2018-08-13 DIAGNOSIS — E03.9 HYPOTHYROIDISM, UNSPECIFIED TYPE: ICD-10-CM

## 2018-08-13 DIAGNOSIS — F20.9 SCHIZOPHRENIA, UNSPECIFIED TYPE (H): ICD-10-CM

## 2018-08-13 DIAGNOSIS — Z00.00 ENCOUNTER FOR ROUTINE ADULT HEALTH EXAMINATION WITHOUT ABNORMAL FINDINGS: Primary | ICD-10-CM

## 2018-08-13 DIAGNOSIS — E78.5 HYPERLIPIDEMIA LDL GOAL <130: ICD-10-CM

## 2018-08-13 PROCEDURE — G0438 PPPS, INITIAL VISIT: HCPCS | Performed by: FAMILY MEDICINE

## 2018-08-13 RX ORDER — LEVOTHYROXINE SODIUM 50 UG/1
TABLET ORAL
Qty: 90 TABLET | Refills: 3 | Status: SHIPPED | OUTPATIENT
Start: 2018-08-13 | End: 2019-08-19

## 2018-08-13 RX ORDER — SIMVASTATIN 20 MG
TABLET ORAL
Qty: 90 TABLET | Refills: 3 | Status: SHIPPED | OUTPATIENT
Start: 2018-08-13 | End: 2019-08-19

## 2018-08-13 RX ORDER — SIMVASTATIN 40 MG
TABLET ORAL
Qty: 90 TABLET | Refills: 3 | Status: SHIPPED | OUTPATIENT
Start: 2018-08-13 | End: 2019-08-19

## 2018-08-13 NOTE — MR AVS SNAPSHOT
After Visit Summary   8/13/2018    Yusef Alvares    MRN: [id number]           Patient Information     Date Of Birth          1975        Visit Information        Provider Department      8/13/2018 11:20 AM Yusef Katz MD [facility]        Today's Diagnoses     Encounter for routine adult health examination without abnormal findings    -  1    Schizophrenia, unspecified type (H)        Hyperlipidemia LDL goal <130        Hypothyroidism, unspecified type          Care Instructions    Please make a fasting lab visit.    I gave you a handout regarding the screen of brain aneurysms which is not recommended at this time with one first degree relative.    Refilled your medications.      Preventive Health Recommendations:       Male Ages 65 and over    Yearly exam:             See your health care provider every year in order to  o   Review health changes.   o   Discuss preventive care.    o   Review your medicines if your doctor has prescribed any.    Talk with your health care provider about whether you should have a test to screen for prostate cancer (PSA).    Every 3 years, have a diabetes test (fasting glucose). If you are at risk for diabetes, you should have this test more often.    Every 5 years, have a cholesterol test. Have this test more often if you are at risk for high cholesterol or heart disease.     Every 10 years, have a colonoscopy. Or, have a yearly FIT test (stool test). These exams will check for colon cancer.    Talk to with your health care provider about screening for Abdominal Aortic Aneurysm if you have a family history of AAA or have a history of smoking.  Shots:     Get a flu shot each year.     Get a tetanus shot every 10 years.     Talk to your doctor about your pneumonia vaccines. There are now two you should receive - Pneumovax (PPSV 23) and Prevnar (PCV 13).    Talk to your pharmacist about a shingles vaccine.     Talk to your doctor about the hepatitis  B vaccine.  Nutrition:     Eat at least 5 servings of fruits and vegetables each day.     Eat whole-grain bread, whole-wheat pasta and brown rice instead of white grains and rice.     Get adequate Calcium and Vitamin D.   Lifestyle    Exercise for at least 150 minutes a week (30 minutes a day, 5 days a week). This will help you control your weight and prevent disease.     Limit alcohol to one drink per day.     No smoking.     Wear sunscreen to prevent skin cancer.     See your dentist every six months for an exam and cleaning.     See your eye doctor every 1 to 2 years to screen for conditions such as glaucoma, macular degeneration and cataracts.          Follow-ups after your visit        Follow-up notes from your care team     Return in about 3 months (around 11/13/2018) for lab visit.      Future tests that were ordered for you today     Open Future Orders        Priority Expected Expires Ordered    Glucose Routine  2/13/2019 8/13/2018    TSH Routine 10/12/2018 2/13/2019 8/13/2018    T4 FREE Routine 10/12/2018 2/13/2019 8/13/2018    Lipid panel reflex to direct LDL Fasting Routine  2/13/2019 8/13/2018            Who to contact     If you have questions or need follow up information about today's clinic visit or your schedule please contact [facility] directly at [phone].  Normal or non-critical lab and imaging results will be communicated to you by Qpynhart, letter or phone within 4 business days after the clinic has received the results. If you do not hear from us within 7 days, please contact the clinic through Qpynhart or phone. If you have a critical or abnormal lab result, we will notify you by phone as soon as possible.  Submit refill requests through CombiMatrix or call your pharmacy and they will forward the refill request to us. Please allow 3 business days for your refill to be completed.          Additional Information About Your Visit        CombiMatrix Information     [employer]Matrix lets you send  "messages to your doctor, view your test results, renew your prescriptions, schedule appointments and more. To sign up, go to [email or web address] . Click on \"Log in\" on the left side of the screen, which will take you to the Welcome page. Then click on \"Sign up Now\" on the right side of the page.     You will be asked to enter the access code listed below, as well as some personal information. Please follow the directions to create your username and password.     Your access code is: [email or web address]  Expires: 10/25/2018  9:32 AM     Your access code will  in 90 days. If you need help or a new code, please call your [facility] clinic or [phone].        Care EveryWhere ID     This is your Care EveryWhere ID. This could be used by other organizations to access your [facility] medical records  [id number]        Your Vitals Were     Pulse Temperature Height BMI (Body Mass Index)          68 98.8  F (37.1  C) (Tympanic) 5' 5\" (1.651 m) 31.82 kg/m2         Blood Pressure from Last 3 Encounters:   18 124/82   18 128/86   17 132/87    Weight from Last 3 Encounters:   18 191 lb 3.2 oz (86.7 kg)   18 190 lb 3.2 oz (86.3 kg)   17 184 lb (83.5 kg)                 Where to get your medicines      [facility] [address] [place], [place] [address] [facility] [address]  [address] [facility] [address]     Phone:  [phone]     levothyroxine 50 MCG tablet    simvastatin 20 MG tablet    simvastatin 40 MG tablet          Primary Care Provider Office Phone # Fax #    Yusef Katz [phone] [address] [facility] [address]        Equal Access to Services     ELIZABETH KISER AH: Ceasar Montiel, odalys amor, vince magana kharash la'aan ah. [facility] [phone].    ATENCIÓN: Si habla español, tiene a lima disposición servicios gratuitos de " asistencia lingüística. Peter al [phone].    We comply with applicable federal civil rights laws and [place] laws. We do not discriminate on the basis of race, color, national origin, age, disability, sex, sexual orientation, or gender identity.            Thank you!     Thank you for choosing [facility]  for your care. Our goal is always to provide you with excellent care. Hearing back from our patients is one way we can continue to improve our services. Please take a few minutes to complete the written survey that you may receive in the mail after your visit with us. Thank you!             Your Updated Medication List - Protect others around you: Learn how to safely use, store and throw away your medicines at www.disposemymeds.org.          This list is accurate as of 8/13/18 12:00 PM.  Always use your most recent med list.                   Brand Name Dispense Instructions for use Diagnosis    ABILIFY 15 MG tablet   Generic drug:  ARIPiprazole      Take 15 mg by mouth daily.        citalopram 40 MG tablet    celeXA     Take 40 mg by mouth daily.        DEPAKOTE  MG 24 hr tablet   Generic drug:  divalproex sodium extended-release      Take 500 mg by mouth daily. Taking 1500 mg at night        hydrOXYzine 50 MG capsule    VISTARIL    120 capsule    Take 1 capsule (50 mg) by mouth 2 times daily As needed        levothyroxine 50 MCG tablet    SYNTHROID/LEVOTHROID    90 tablet    TAKE 1 TABLET(50 MCG) BY MOUTH DAILY    Hypothyroidism, unspecified type       naltrexone 50 MG tablet    DEPADE;REVIA     Take 50 mg by mouth daily        * simvastatin 20 MG tablet    ZOCOR    90 tablet    TAKE 1 TABLET BY MOUTH AT BEDTIME WITH A 40 MG TABLET FOR A TOTAL DAILY DOSE OF 60MG    Hyperlipidemia LDL goal <130       * simvastatin 40 MG tablet    ZOCOR    90 tablet    TAKE 1 TABLET BY MOUTH DAILY AT BEDTIME WITH A 20MG TABLET FOR A TOTAL DAILY DOSE OF 60MG    Hyperlipidemia LDL goal <130       STRATTERA  80 MG capsule   Generic drug:  atomoxetine      Take 80 mg by mouth daily        triamcinolone 0.1 % paste    KENALOG    5 g    Take by mouth 2 times daily Until sore is healed    Canker sore       triamcinolone 0.5 % cream    KENALOG    30 g    Apply sparingly to affected area three times daily to elbows    Eczema       * Notice:  This list has 2 medication(s) that are the same as other medications prescribed for you. Read the directions carefully, and ask your doctor or other care provider to review them with you.

## 2018-08-13 NOTE — PATIENT INSTRUCTIONS
Please make a fasting lab visit.    I gave you a handout regarding the screen of brain aneurysms which is not recommended at this time with one first degree relative.    Refilled your medications.      Preventive Health Recommendations:       Male Ages 65 and over    Yearly exam:             See your health care provider every year in order to  o   Review health changes.   o   Discuss preventive care.    o   Review your medicines if your doctor has prescribed any.    Talk with your health care provider about whether you should have a test to screen for prostate cancer (PSA).    Every 3 years, have a diabetes test (fasting glucose). If you are at risk for diabetes, you should have this test more often.    Every 5 years, have a cholesterol test. Have this test more often if you are at risk for high cholesterol or heart disease.     Every 10 years, have a colonoscopy. Or, have a yearly FIT test (stool test). These exams will check for colon cancer.    Talk to with your health care provider about screening for Abdominal Aortic Aneurysm if you have a family history of AAA or have a history of smoking.  Shots:     Get a flu shot each year.     Get a tetanus shot every 10 years.     Talk to your doctor about your pneumonia vaccines. There are now two you should receive - Pneumovax (PPSV 23) and Prevnar (PCV 13).    Talk to your pharmacist about a shingles vaccine.     Talk to your doctor about the hepatitis B vaccine.  Nutrition:     Eat at least 5 servings of fruits and vegetables each day.     Eat whole-grain bread, whole-wheat pasta and brown rice instead of white grains and rice.     Get adequate Calcium and Vitamin D.   Lifestyle    Exercise for at least 150 minutes a week (30 minutes a day, 5 days a week). This will help you control your weight and prevent disease.     Limit alcohol to one drink per day.     No smoking.     Wear sunscreen to prevent skin cancer.     See your dentist every six months for an exam and  cleaning.     See your eye doctor every 1 to 2 years to screen for conditions such as glaucoma, macular degeneration and cataracts.

## 2018-08-13 NOTE — PROGRESS NOTES
SUBJECTIVE:   Yusef Alvares is a 42 year old male who presents for Preventive Visit.    Are you in the first 12 months of your Medicare Part B coverage?  No    Healthy Habits:    Do you get at least three servings of calcium containing foods daily (dairy, green leafy vegetables, etc.)? yes    Amount of exercise or daily activities, outside of work: not much    Problems taking medications regularly No    Medication side effects: Yes some constipation    Have you had an eye exam in the past two years? yes    Do you see a dentist twice per year? yes    Do you have sleep apnea, excessive snoring or daytime drowsiness? Yes- using C-pap      Ability to successfully perform activities of daily living: Yes, no assistance needed    Home safety:  lack of grab bars in the bathroom     Hearing impairment: No    Fall risk:  Fallen 2 or more times in the past year?: No  Any fall with injury in the past year?: No        COGNITIVE SCREEN  1) Repeat 3 items (Leader, Season, Table)    2) Clock draw: NORMAL  3) 3 item recall: Recalls 3 objects  Results: NORMAL clock, 1-2 items recalled: COGNITIVE IMPAIRMENT LESS LIKELY    Mini-CogTM Copyright VICKIE Mcfadden. Licensed by the author for use in [facility]; reprinted with permission (heaven@[facility]). All rights reserved.          Reviewed and updated as needed this visit by clinical staff  Tobacco  Allergies  Meds  Med Hx  Surg Hx  Fam Hx  Soc Hx        Reviewed and updated as needed this visit by Provider        Social History   Substance Use Topics     Smoking status: Never Smoker     Smokeless tobacco: Never Used     Alcohol use No       If you drink alcohol do you typically have >3 drinks per day or >7 drinks per week? No                        Today's PHQ-2 Score:   PHQ-2 ( 1999 [employer]) 8/13/2018 7/14/2017   Q1: Little interest or pleasure in doing things 0 0   Q2: Feeling down, depressed or hopeless 0 0   PHQ-2 Score 0 0       Do you feel safe in your environment -  "Yes    Do you have a Health Care Directive?: No: Advance care planning reviewed with patient; information given to patient to review.    Current providers sharing in care for this patient include:   Patient Care Team:  Yusef Katz MD as PCP - General (Family Practice)    The following health maintenance items are reviewed in Epic and correct as of today:  Health Maintenance   Topic Date Due     MEDICARE ANNUAL WELLNESS VISIT  10/02/1992     Mental Health Tx Plan Q11 MOS  03/02/2016     INFLUENZA VACCINE (1) 09/01/2018     PHQ-2 Q1 YR  11/27/2018     TETANUS IMMUNIZATION (SYSTEM ASSIGNED)  06/13/2022     LIPID SCREEN Q5 YR MALE (SYSTEM ASSIGNED)  10/05/2022     HIV SCREEN (SYSTEM ASSIGNED)  Completed             ROS:  Review Of Systems  Skin: negative  Eyes: negative  Ears/Nose/Throat: negative  Respiratory: No shortness of breath, dyspnea on exertion, cough, or hemoptysis  Cardiovascular: negative  Gastrointestinal: negative  Genitourinary: negative  Musculoskeletal: negative  Neurologic: negative  Psychiatric: stable  Hematologic/Lymphatic/Immunologic: negative  Endocrine: negative      OBJECTIVE:   /82  Pulse 68  Temp 98.8  F (37.1  C) (Tympanic)  Ht 5' 5\" (1.651 m)  Wt 191 lb 3.2 oz (86.7 kg)  BMI 31.82 kg/m2 Estimated body mass index is 31.82 kg/(m^2) as calculated from the following:    Height as of this encounter: 5' 5\" (1.651 m).    Weight as of this encounter: 191 lb 3.2 oz (86.7 kg).  EXAM:   GENERAL: healthy, alert and no distress  EYES: Eyes grossly normal to inspection, PERRL and conjunctivae and sclerae normal  HENT: ear canals and TM's normal, nose and mouth without ulcers or lesions  NECK: no adenopathy, no asymmetry, masses, or scars and thyroid normal to palpation  RESP: lungs clear to auscultation - no rales, rhonchi or wheezes  CV: regular rate and rhythm, normal S1 S2, no S3 or S4, no murmur, click or rub, no peripheral edema and peripheral pulses strong  ABDOMEN: soft, " "nontender, no hepatosplenomegaly, no masses and bowel sounds normal   (male): testicles normal without atrophy or masses, no hernias and penis normal without urethral discharge  MS: no gross musculoskeletal defects noted, no edema  SKIN: no suspicious lesions or rashes  NEURO: Normal strength and tone, mentation intact and speech normal  PSYCH: mentation appears normal, affect normal/bright  LYMPH: anterior cervical: no adenopathy  posterior cervical: no adenopathy        ASSESSMENT / PLAN:   (Z00.00) Encounter for routine adult health examination without abnormal findings  (primary encounter diagnosis)  Comment: Discussed healthy lifestyle and preventative cares.    Plan:     (F20.9) Schizophrenia, unspecified type (H)  Comment: stable and seeing psych  Plan: Glucose            (E78.5) Hyperlipidemia LDL goal <130  Comment:   Plan: Lipid panel reflex to direct LDL Fasting,         simvastatin (ZOCOR) 20 MG tablet, simvastatin         (ZOCOR) 40 MG tablet            (E03.9) Hypothyroidism, unspecified type  Comment: need to check lab  Plan: TSH, T4 FREE, levothyroxine         (SYNTHROID/LEVOTHROID) 50 MCG tablet              End of Life Planning:  Patient currently has an advanced directive: No.  I have verified the patient's ablity to prepare an advanced directive/make health care decisions.  Literature was provided to assist patient in preparing an advanced directive.    COUNSELING:  Reviewed preventive health counseling, as reflected in patient instructions       Regular exercise       Healthy diet/nutrition       Vision screening       Hearing screening       Dental care    BP Readings from Last 1 Encounters:   08/13/18 124/82     Estimated body mass index is 31.82 kg/(m^2) as calculated from the following:    Height as of this encounter: 5' 5\" (1.651 m).    Weight as of this encounter: 191 lb 3.2 oz (86.7 kg).      Weight management plan: diet and activity     reports that he has never smoked. He has never " used smokeless tobacco.      Appropriate preventive services were discussed with this patient, including applicable screening as appropriate for cardiovascular disease, diabetes, osteopenia/osteoporosis, and glaucoma.  As appropriate for age/gender, discussed screening for colorectal cancer, prostate cancer, breast cancer, and cervical cancer. Checklist reviewing preventive services available has been given to the patient.    Reviewed patients plan of care and provided an AVS. The Basic Care Plan (routine screening as documented in Health Maintenance) for Yusef meets the Care Plan requirement. This Care Plan has been established and reviewed with the Patient.    Counseling Resources:  ATP IV Guidelines  Pooled Cohorts Equation Calculator  Breast Cancer Risk Calculator  FRAX Risk Assessment  ICSI Preventive Guidelines  Dietary Guidelines for Americans, 2010  USDA's MyPlate  ASA Prophylaxis  Lung CA Screening    Yusef Katz MD  [facility]

## 2018-09-24 ENCOUNTER — APPOINTMENT (OUTPATIENT)
Dept: LAB | Facility: CLINIC | Age: 43
End: 2018-09-24
Payer: COMMERCIAL

## 2018-09-24 DIAGNOSIS — Z79.899 HIGH RISK MEDICATION USE: Primary | ICD-10-CM

## 2018-09-24 DIAGNOSIS — F20.9 SCHIZOPHRENIA, UNSPECIFIED TYPE (H): ICD-10-CM

## 2018-09-24 DIAGNOSIS — E78.5 HYPERLIPIDEMIA LDL GOAL <130: ICD-10-CM

## 2018-09-24 DIAGNOSIS — E03.9 HYPOTHYROIDISM, UNSPECIFIED TYPE: ICD-10-CM

## 2018-09-24 LAB
ALBUMIN SERPL-MCNC: 4 G/DL (ref 3.4–5)
ALP SERPL-CCNC: 48 U/L (ref 40–150)
ALT SERPL W P-5'-P-CCNC: 29 U/L (ref 0–70)
AST SERPL W P-5'-P-CCNC: 19 U/L (ref 0–45)
BILIRUB DIRECT SERPL-MCNC: 0.3 MG/DL (ref 0–0.2)
BILIRUB SERPL-MCNC: 1.7 MG/DL (ref 0.2–1.3)
CHOLEST SERPL-MCNC: 149 MG/DL
GLUCOSE SERPL-MCNC: 91 MG/DL (ref 70–99)
HBA1C MFR BLD: 5.4 % (ref 0–5.6)
HDLC SERPL-MCNC: 44 MG/DL
LDLC SERPL CALC-MCNC: 85 MG/DL
NONHDLC SERPL-MCNC: 105 MG/DL
PROT SERPL-MCNC: 7.3 G/DL (ref 6.8–8.8)
T4 FREE SERPL-MCNC: 0.93 NG/DL (ref 0.76–1.46)
TRIGL SERPL-MCNC: 100 MG/DL
TSH SERPL DL<=0.005 MIU/L-ACNC: 1.94 MU/L (ref 0.4–4)

## 2018-09-24 PROCEDURE — 80061 LIPID PANEL: CPT | Performed by: FAMILY MEDICINE

## 2018-09-24 PROCEDURE — 84443 ASSAY THYROID STIM HORMONE: CPT | Performed by: FAMILY MEDICINE

## 2018-09-24 PROCEDURE — 80076 HEPATIC FUNCTION PANEL: CPT | Performed by: NURSE PRACTITIONER

## 2018-09-24 PROCEDURE — 82947 ASSAY GLUCOSE BLOOD QUANT: CPT | Performed by: FAMILY MEDICINE

## 2018-09-24 PROCEDURE — 36415 COLL VENOUS BLD VENIPUNCTURE: CPT | Performed by: FAMILY MEDICINE

## 2018-09-24 PROCEDURE — 83036 HEMOGLOBIN GLYCOSYLATED A1C: CPT | Performed by: FAMILY MEDICINE

## 2018-09-24 PROCEDURE — 84439 ASSAY OF FREE THYROXINE: CPT | Performed by: FAMILY MEDICINE

## 2018-11-15 DIAGNOSIS — E03.9 HYPOTHYROIDISM: ICD-10-CM

## 2018-11-15 RX ORDER — LEVOTHYROXINE SODIUM 50 UG/1
TABLET ORAL
Qty: 90 TABLET | Refills: 0 | OUTPATIENT
Start: 2018-11-15

## 2018-11-15 NOTE — TELEPHONE ENCOUNTER
"Requested Prescriptions   Pending Prescriptions Disp Refills     levothyroxine (SYNTHROID/LEVOTHROID) 50 MCG tablet [Pharmacy Med Name: LEVOTHYROXINE 0.05MG (50MCG) TAB] 90 tablet 0     Sig: TAKE 1 TABLET(50 MCG) BY MOUTH DAILY    Thyroid Protocol Passed    11/15/2018  3:38 AM       Passed - Patient is 12 years or older       Passed - Recent (12 mo) or future (30 days) visit within the authorizing provider's specialty    Patient had office visit in the last 12 months or has a visit in the next 30 days with authorizing provider or within the authorizing provider's specialty.  See \"Patient Info\" tab in inbasket, or \"Choose Columns\" in Meds & Orders section of the refill encounter.             Passed - Normal TSH on file in past 12 months    Recent Labs   Lab Test  09/24/18   0822   TSH  1.94              Last Written Prescription Date:  8/13/18  Last Fill Quantity: 90,  # refills: 3   Last office visit: 8/13/2018 with prescribing provider:  [email or web address]   Future Office Visit:      "

## 2018-11-30 ENCOUNTER — TELEPHONE (OUTPATIENT)
Dept: FAMILY MEDICINE | Facility: CLINIC | Age: 43
End: 2018-11-30

## 2019-01-03 ENCOUNTER — OFFICE VISIT (OUTPATIENT)
Dept: FAMILY MEDICINE | Facility: CLINIC | Age: 44
End: 2019-01-03
Payer: COMMERCIAL

## 2019-01-03 VITALS
DIASTOLIC BLOOD PRESSURE: 84 MMHG | HEIGHT: 65 IN | HEART RATE: 80 BPM | SYSTOLIC BLOOD PRESSURE: 124 MMHG | TEMPERATURE: 97.8 F | WEIGHT: 187.8 LBS | BODY MASS INDEX: 31.29 KG/M2 | OXYGEN SATURATION: 93 %

## 2019-01-03 DIAGNOSIS — M54.42 ACUTE LEFT-SIDED LOW BACK PAIN WITH LEFT-SIDED SCIATICA: Primary | ICD-10-CM

## 2019-01-03 DIAGNOSIS — Z23 NEED FOR PROPHYLACTIC VACCINATION AND INOCULATION AGAINST INFLUENZA: ICD-10-CM

## 2019-01-03 PROCEDURE — 99213 OFFICE O/P EST LOW 20 MIN: CPT | Mod: 25 | Performed by: FAMILY MEDICINE

## 2019-01-03 PROCEDURE — 90471 IMMUNIZATION ADMIN: CPT | Performed by: FAMILY MEDICINE

## 2019-01-03 PROCEDURE — 90686 IIV4 VACC NO PRSV 0.5 ML IM: CPT | Performed by: FAMILY MEDICINE

## 2019-01-03 ASSESSMENT — MIFFLIN-ST. JEOR: SCORE: 1668.74

## 2019-01-03 NOTE — PROGRESS NOTES

## 2019-01-03 NOTE — PROGRESS NOTES
SUBJECTIVE:   Yusef Alvares is a 43 year old male who presents to clinic today for the following health issues:  Chief Complaint   Patient presents with     Back Pain     Pt here for low back pain.     Flu Shot       Back Pain       Duration: 3 wks        Specific cause: turning/bending, maybe slept wrong    Description:   Location of pain: low back left  Character of pain: dull ache and burning pain, worse when bending forward and intermittent  Pain radiation:radiates into the left leg - thigh and into knee  New numbness or weakness in legs, not attributed to pain:  no     Intensity: Currently 4/10, At its worst 7/10    History:   Pain interferes with job: Not applicable  History of back problems: no prior back problems, sometimes will hurt after walking a long ways  Any previous MRI or X-rays: None  Sees a specialist for back pain:  No  Therapies tried without relief: none    Alleviating factors:   Improved by: NSAIDs and stretch (aleve)     Precipitating factors:  Worsened by: Lifting and Bending trunk forward      Accompanying Signs & Symptoms:  Risk of Fracture:  None  Risk of Cauda Equina:  None  Risk of Infection:  None  Risk of Cancer:  None  Risk of Ankylosing Spondylitis:  Onset at age <35, male, AND morning back stiffness. no     Verified above history with patient.      Problem list and histories reviewed & adjusted, as indicated.  Additional history: as documented    Patient Active Problem List   Diagnosis     Schizophrenia (H)     Weight gain     Hyperlipidemia LDL goal <130     Acne scarring     Hypothyroidism     TB (tuberculosis) contact     ABDULKADIR (obstructive sleep apnea)     Schizoaffective disorder (H)     CARDIOVASCULAR SCREENING; LDL GOAL LESS THAN 130     Lesions of both ulnar nerves     Right-sided low back pain with right-sided sciatica     Obesity (BMI 30.0-34.9)     Past Surgical History:   Procedure Laterality Date     DENTAL SURGERY      Extraction of abscess tooth     WISDOM ST GUIDEWIRE          Social History     Tobacco Use     Smoking status: Never Smoker     Smokeless tobacco: Never Used   Substance Use Topics     Alcohol use: No     Alcohol/week: 0.0 oz     Family History   Problem Relation Age of Onset     Psychotic Disorder Mother         pre-schizophrenia     Breast Cancer Mother      Respiratory Mother         copd.   Sleep apnea     Cardiovascular Mother         aneurysms x 2     Hypertension Mother      Hyperlipidemia Mother      Alcohol/Drug Father      Respiratory Father         copd     Asthma Father      Gastrointestinal Disease Father         appendectomy     Hypertension Father      Hyperlipidemia Father      Alcohol/Drug Maternal Grandmother         cirrhosis of liver     Depression Maternal Grandfather      Arthritis Maternal Grandfather         hip replacement     Cancer Paternal Grandmother      Asthma Brother      Respiratory Brother         copd         Current Outpatient Medications   Medication Sig Dispense Refill     ARIPiprazole (ABILIFY) 15 MG tablet Take 15 mg by mouth daily.       atomoxetine (STRATTERA) 80 MG capsule Take 80 mg by mouth daily       citalopram (CELEXA) 40 MG tablet Take 40 mg by mouth daily.       hydrOXYzine (VISTARIL) 50 MG capsule Take 1 capsule (50 mg) by mouth 2 times daily As needed 120 capsule      levothyroxine (SYNTHROID/LEVOTHROID) 50 MCG tablet TAKE 1 TABLET(50 MCG) BY MOUTH DAILY 90 tablet 3     naltrexone (DEPADE;REVIA) 50 MG tablet Take 50 mg by mouth daily       simvastatin (ZOCOR) 20 MG tablet TAKE 1 TABLET BY MOUTH AT BEDTIME WITH A 40 MG TABLET FOR A TOTAL DAILY DOSE OF 60MG 90 tablet 3     simvastatin (ZOCOR) 40 MG tablet TAKE 1 TABLET BY MOUTH DAILY AT BEDTIME WITH A 20MG TABLET FOR A TOTAL DAILY DOSE OF 60MG 90 tablet 3     triamcinolone (KENALOG) 0.1 % paste Take by mouth 2 times daily Until sore is healed (Patient not taking: Reported on 1/3/2019) 5 g 1     triamcinolone (KENALOG) 0.5 % cream Apply sparingly to affected area three  "times daily to elbows (Patient not taking: Reported on 1/3/2019) 30 g 5     No Known Allergies    Reviewed and updated as needed this visit by clinical staff  Tobacco  Allergies  Meds  Problems  Med Hx  Surg Hx  Fam Hx  Soc Hx        Reviewed and updated as needed this visit by Provider  Tobacco  Allergies  Meds  Problems  Med Hx  Surg Hx  Fam Hx         ROS:  C: NEGATIVE for fever, chills, change in weight  I: NEGATIVE for worrisome rashes, moles or lesions  GI: NEGATIVE for nausea, abdominal pain, heartburn, or change in bowel habits  : NEGATIVE for frequency, dysuria, or hematuria  MUSCULOSKELETAL: see above  N: NEGATIVE for weakness, dizziness or paresthesias  H: NEGATIVE for bleeding problems    OBJECTIVE:                                                    /84   Pulse 80   Temp 97.8  F (36.6  C) (Tympanic)   Ht 1.651 m (5' 5\")   Wt 85.2 kg (187 lb 12.8 oz)   SpO2 93%   BMI 31.25 kg/m    Body mass index is 31.25 kg/m .  GENERAL:  alert and no distress  NECK: no tenderness, no adenopathy, no asymmetry, no masses, no stiffness  MS: extremities- no gross deformities noted, no edema  SKIN: no suspicious lesions, no rashes  NEURO: strength and tone- normal, sensory exam- grossly normal, reflexes- symmetric  BACK: normal curvature, no deformity, no skin changes, no tendernes on palpation, range of motion full, SLR negative bilaterally    Diagnostic test results:  Diagnostic Test Results:  none      ASSESSMENT/PLAN:                                                        ICD-10-CM    1. Acute left-sided low back pain with left-sided sciatica M54.42 No cauda equina red flags. Radicular pain not present today  Discussed causes, course and possible treatments.  No direct trauma to back; no clear indication for imaging.  Discussed use of APAP and Naproxen; patient concurred with meds.  Activity modification discussed.  Patient admits pain is getting better over the last week. This is expected to " "continue to gradually improve over next several days to 2-3 weeks.  Return precautions discussed and given to patient.     2. Need for prophylactic vaccination and inoculation against influenza Z23 FLU VACCINE, SPLIT VIRUS, IM (QUADRIVALENT) [64822]- >3 YRS     Vaccine Administration, Initial [11589]       Follow up with Provider - 2-3 weeks or prn   Patient Instructions     Acetaminophen 325 mg orally 1-2 tabs every 4-6 hrs as needed for pain.    May take Naproxen 220 mg orally with food every 12 hrs as needed for pain not relieved by acetaminophen.    Expect gradual improvement further in the next 2-3 more weeks.    If not improved or resolved by 3 weeks from now or if worse, see provider again.  Patient Education       * Sciatica    Sciatica (\"Lumbar Radiculopathy\") causes a pain that spreads from the lower back down into the buttock, hip and leg. Sometimes leg pain can occur without any back pain. Sciatica is due to irritation or pressure on a spinal nerve as it comes out of the spinal canal. This is most often due to pressure on the nerve from a nearby spinal disk (the cartilage cushion between each spinal bone). Other causes include spinal stenosis (narrowing of the spinal canal) and spasm of the piriformis muscle (a muscle in the buttocks that the sciatic nerve passes through).  Sciatica may begin after a sudden twisting/bending force (such as in a car accident), or sometimes after a simple awkward movement. In either case, muscle spasm is commonly present and can add to the pain.  The diagnosis of sciatica is made from the symptoms and physical exam. Unless you had a physical injury (such as a car accident or fall), X-rays are usually not ordered for the initial evaluation of sciatica because the nerves and disks cannot be seen on an X-ray. Most sciatica (80-90%) gets better with time.  What can I do about my low back pain?  There are three main things you can do to ease low back pain and help it go " away.    Stay active! Use positions, movements and exercises. Too much rest can make your symptoms worse.    Use heat or cold packs.    Take medicine as directed.  Using positions, movements and exercises  Research tells us that moving your joints and muscles can help you recover from back pain. Such activity should be simple and gentle.  Use walking to help relieve your discomfort. Try taking a short walk every 3 to 4 hours during the day. Walk for a few minutes inside your home or take longer walks outside, on a treadmill or at a mall. Slowly increase the amount of time you walk. Expect discomfort when you begin, but it should lessen as your back starts to recover.  Finding a position that is comfortable  When your back pain is new, you may find that certain positions will ease your pain. Gently try each of the following positions until you find one that eases your pain. Once you find a position of comfort, use it as often as you like while you recover. Return to your daily routine as soon as possible.     Lie on your back with your legs bent. You can do this by placing a pillow under your knees or lie on the floor and rest your lower legs on the seat of a chair.    Lie on your side with your knees bent and place a pillow between your knees.    Lie on your stomach over pillows.  Using heat or cold packs  Try cold packs or gentle heat to ease your pain. Use whichever gives you the most relief. Apply the cold pack or heat for 15 minutes at a time, as often as needed.  Taking medicine  If taking over-the-counter medicine:    Take ibuprofen (Advil, Motrin) 600 mg. three times a day as needed for pain.  OR    Take Aleve (naproxen sodium) 220 to 440 mg. two times a day as needed for pain.  If your doctor prescribed medicine, follow the instructions. Stop taking the medicine as soon as you can.  When should I call my doctor?  Your back pain should improve over the first couple of weeks. As it improves, you should be able  to return to your normal activities. But call your doctor if:    You have a sudden change in your ability to control? your bladder or bowels.    You begin to feel tingling in your groin or legs.    The pain spreads down your leg and into your foot.    Your toes, feet or leg muscles begin to feel weak.    You feel generally unwell or sick.    Your pain gets worse.    0171-9108 [employer]. [address] [place] [address], [place] [address]. All rights reserved. This information is not intended as a substitute for professional medical care. Always follow your healthcare professional's instructions.  This information has been modified by your health care provider with permission from the publisher.           Patient Education     Relieving Back Pain  Back pain is a common problem. You can strain back muscles by lifting too much weight or just by moving the wrong way. Back strain can be uncomfortable, even painful. And it can take weeks or months to improve. To help yourself feel better and prevent future back strains, try these tips.  Important: Don't give aspirin to children or teens without first discussing it with your child's healthcare provider.   Ice    Ice reduces muscle pain and swelling. It helps most during the first 24 to 48 hours after an injury.    Wrap an ice pack or a bag of frozen peas in a thin towel. Never put ice directly on your skin.    Place the ice where your back hurts the most.    Don t ice for more than 20 minutes at a time.    You can use ice several times a day.  Medicines  Over-the-counter pain relievers include acetaminophen and anti-inflammatory medicines, which includes aspirin, naproxen, or ibuprofen. They can help ease discomfort. Some also reduce swelling.    Tell your healthcare provider about any medicines you are already taking.    Take medicines only as directed.  Manipulation and massage  Having manipulation by an osteopathic doctor or chiropractor may be helpful. Getting a  massage also may help.   Heat  After the first 48 hours, heat can relax sore muscles and improve blood flow.    Try a warm bath or shower. Or use a heating pad set on low. To prevent a burn, keep a cloth between you and the heating pad.    Don t use a heating pad for more than 15 minutes at a time. Never sleep on a heating pad.  Date Last Reviewed: 6/1/2018 2000-2018 [employer]. [address] [place] [address], [place] [address]. All rights reserved. This information is not intended as a substitute for professional medical care. Always follow your healthcare professional's instructions.               Brooks Uriarte MD  [facility]

## 2019-08-19 ENCOUNTER — OFFICE VISIT (OUTPATIENT)
Dept: FAMILY MEDICINE | Facility: CLINIC | Age: 44
End: 2019-08-19
Payer: COMMERCIAL

## 2019-08-19 VITALS
HEART RATE: 76 BPM | OXYGEN SATURATION: 96 % | WEIGHT: 187 LBS | HEIGHT: 65 IN | BODY MASS INDEX: 31.16 KG/M2 | SYSTOLIC BLOOD PRESSURE: 126 MMHG | DIASTOLIC BLOOD PRESSURE: 86 MMHG | TEMPERATURE: 98.4 F

## 2019-08-19 DIAGNOSIS — F20.9 SCHIZOPHRENIA, UNSPECIFIED TYPE (H): ICD-10-CM

## 2019-08-19 DIAGNOSIS — E03.9 HYPOTHYROIDISM, UNSPECIFIED TYPE: ICD-10-CM

## 2019-08-19 DIAGNOSIS — Z00.00 ENCOUNTER FOR MEDICARE ANNUAL WELLNESS EXAM: Primary | ICD-10-CM

## 2019-08-19 DIAGNOSIS — L30.9 DERMATITIS: ICD-10-CM

## 2019-08-19 DIAGNOSIS — R73.9 HYPERGLYCEMIA: ICD-10-CM

## 2019-08-19 DIAGNOSIS — E78.5 HYPERLIPIDEMIA LDL GOAL <130: ICD-10-CM

## 2019-08-19 DIAGNOSIS — Z13.1 SCREENING FOR DIABETES MELLITUS: ICD-10-CM

## 2019-08-19 DIAGNOSIS — E78.5 HYPERLIPIDEMIA LDL GOAL <100: ICD-10-CM

## 2019-08-19 PROCEDURE — G0439 PPPS, SUBSEQ VISIT: HCPCS | Performed by: FAMILY MEDICINE

## 2019-08-19 PROCEDURE — 99213 OFFICE O/P EST LOW 20 MIN: CPT | Mod: 25 | Performed by: FAMILY MEDICINE

## 2019-08-19 RX ORDER — LEVOTHYROXINE SODIUM 50 UG/1
TABLET ORAL
Qty: 90 TABLET | Refills: 3 | Status: SHIPPED | OUTPATIENT
Start: 2019-08-19 | End: 2020-07-14

## 2019-08-19 RX ORDER — SIMVASTATIN 20 MG
TABLET ORAL
Qty: 90 TABLET | Refills: 3 | Status: SHIPPED | OUTPATIENT
Start: 2019-08-19 | End: 2020-07-14

## 2019-08-19 RX ORDER — TRIAMCINOLONE ACETONIDE 1 MG/G
CREAM TOPICAL 2 TIMES DAILY
Qty: 45 G | Refills: 1 | Status: SHIPPED | OUTPATIENT
Start: 2019-08-19 | End: 2019-10-14

## 2019-08-19 RX ORDER — SIMVASTATIN 40 MG
TABLET ORAL
Qty: 90 TABLET | Refills: 3 | Status: SHIPPED | OUTPATIENT
Start: 2019-08-19 | End: 2020-07-14

## 2019-08-19 ASSESSMENT — MIFFLIN-ST. JEOR: SCORE: 1661.14

## 2019-08-19 NOTE — PATIENT INSTRUCTIONS
Please make a fasting lab visit.    I have reordered the medication that I refill for the year.    Please stay active.  Try to lose about 10-15 pounds over the next year.      Thank you for choosing [facility].  You may be receiving an email and/or telephone survey request from [facility] Customer Experience regarding your visit today.  Please take a few minutes to respond to the survey to let us know how we are doing.      If you have questions or concerns, please contact us via [facility] or you can contact your care team at [phone].    Our Clinic hours are:  Monday 6:40 am  to 7:00 pm  Tuesday -Friday 6:40 am to 5:00 pm    The [place] outpatient lab hours are:  Monday - Friday 6:10 am to 4:45 pm  Saturdays 7:00 am to 11:00 am  Appointments are required, call [phone]    If you have clinical questions after hours or would like to schedule an appointment,  call the clinic at [phone].    Patient Education   Personalized Prevention Plan  You are due for the preventive services outlined below.  Your care team is available to assist you in scheduling these services.  If you have already completed any of these items, please share that information with your care team to update in your medical record.  Health Maintenance Due   Topic Date Due     MENTAL HEALTH TX PLAN  03/02/2016     PHQ-2  01/01/2019     Annual Wellness Visit  08/13/2019

## 2019-08-19 NOTE — PROGRESS NOTES
"SUBJECTIVE:   Yusef Alvares is a 43 year old male who presents for Preventive Visit.  Are you in the first 12 months of your Medicare Part B coverage?  No    Physical Health:    In general, how would you rate your overall physical health? excellent    Outside of work, how many days during the week do you exercise? 1 day/week    Outside of work, approximately how many minutes a day do you exercise?less than 15 minutes    If you drink alcohol do you typically have >3 drinks per day or >7 drinks per week? No    Do you usually eat at least 4 servings of fruit and vegetables a day, include whole grains & fiber and avoid regularly eating high fat or \"junk\" foods? NO    Do you have any problems taking medications regularly?  No    Do you have any side effects from medications? none    Needs assistance for the following daily activities: no assistance needed    Which of the following safety concerns are present in your home?  none identified     Hearing impairment: No    In the past 6 months, have you been bothered by leaking of urine? no    Mental Health:    In general, how would you rate your overall mental or emotional health? good  PHQ-2 Score:      Do you feel safe in your environment? Yes    Do you have a Health Care Directive? Yes: Patient states has Advance Directive and will bring in a copy to clinic.    Additional concerns to address?    Chief Complaint   Patient presents with     Physical     Medicare Wellness     Derm Problem     dry patches of skin upper extremities.  Itchy rash on his lower right leg.       Fall risk:  Fallen 2 or more times in the past year?: No  Any fall with injury in the past year?: No    Cognitive Screenin) Repeat 3 items (Leader, Season, Table)    2) Clock draw: NORMAL  3) 3 item recall: Recalls 3 objects  Results: 3 items recalled: COGNITIVE IMPAIRMENT LESS LIKELY    Mini-CogTM Copyright [employer]. Licensed by the author for use in [facility]; reprinted with permission " "(heaven@[facility]). All rights reserved.          Reviewed and updated as needed this visit by clinical staff  Tobacco  Allergies  Meds  Med Hx  Surg Hx  Fam Hx  Soc Hx        Reviewed and updated as needed this visit by Provider        Social History     Tobacco Use     Smoking status: Never Smoker     Smokeless tobacco: Never Used   Substance Use Topics     Alcohol use: No     Alcohol/week: 0.0 oz                           Current providers sharing in care for this patient include:   Patient Care Team:  Yusef Katz MD as PCP - General (Family Practice)  Yusef Katz MD as Assigned PCP    The following health maintenance items are reviewed in Epic and correct as of today:  Health Maintenance   Topic Date Due     MENTAL HEALTH TX PLAN  03/02/2016     PHQ-2  01/01/2019     MEDICARE ANNUAL WELLNESS VISIT  08/13/2019     INFLUENZA VACCINE (1) 09/01/2019     DTAP/TDAP/TD IMMUNIZATION (3 - Td) 06/13/2022     LIPID  09/24/2023     HIV SCREENING  Completed     IPV IMMUNIZATION  Completed     MENINGITIS IMMUNIZATION  Aged Out           ROS:  Review Of Systems  Skin: rash on right leg. It itches at times  Eyes: negative  Ears/Nose/Throat: negative  Respiratory: No shortness of breath, dyspnea on exertion, cough, or hemoptysis  Cardiovascular: negative  Gastrointestinal: negative  Genitourinary: negative  Musculoskeletal: negative  Neurologic: negative  Psychiatric: seeing psych for schizophrenia  Hematologic/Lymphatic/Immunologic: negative  Endocrine: negative      OBJECTIVE:   /86 (Cuff Size: Adult Large)   Pulse 76   Temp 98.4  F (36.9  C) (Tympanic)   Ht 1.645 m (5' 4.75\")   Wt 84.8 kg (187 lb)   SpO2 96%   BMI 31.36 kg/m   Estimated body mass index is 31.36 kg/m  as calculated from the following:    Height as of this encounter: 1.645 m (5' 4.75\").    Weight as of this encounter: 84.8 kg (187 lb).  EXAM:   GENERAL: healthy, alert and no distress  EYES: Eyes grossly normal to inspection, PERRL " and conjunctivae and sclerae normal  HENT: ear canals and TM's normal, nose and mouth without ulcers or lesions  NECK: no adenopathy, no asymmetry, masses, or scars and thyroid normal to palpation  RESP: lungs clear to auscultation - no rales, rhonchi or wheezes  CV: regular rate and rhythm, normal S1 S2, no S3 or S4, no murmur, click or rub, no peripheral edema and peripheral pulses strong  ABDOMEN: soft, nontender, no hepatosplenomegaly, no masses and bowel sounds normal   (male): normal male genitalia without lesions or urethral discharge, no hernia  MS: no gross musculoskeletal defects noted, no edema  SKIN: no suspicious lesions or rashes  SKIN: mild dermatitis to hand 2 spots of nummular eczema, mild dermatitis papular to right leg only some excoriations  NEURO: Normal strength and tone, mentation intact and speech normal  PSYCH: mentation appears normal, affect normal/bright  LYMPH: no cervical adenopathy        ASSESSMENT / PLAN:   (Z00.00) Encounter for Medicare annual wellness exam  (primary encounter diagnosis)  Comment: Discussed healthy lifestyle and preventative cares.    Plan:     (F20.9) Schizophrenia, unspecified type (H)  Comment: seeing psych  Plan:     (L30.9) Dermatitis  Comment: instructed on cream use prn  Plan: triamcinolone (KENALOG) 0.1 % external cream,         OFFICE/OUTPT VISIT,EST,LEVL III            (E03.9) Hypothyroidism, unspecified type  Comment: check lab and refilled med  Plan: TSH, T4 FREE, levothyroxine         (SYNTHROID/LEVOTHROID) 50 MCG tablet,         OFFICE/OUTPT VISIT,EST,LEVL III            (R73.9) Hyperglycemia  Comment: need to check  Plan: Glucose, Hemoglobin A1c, OFFICE/OUTPT         VISIT,EST,LEVL III            (E78.5) Hyperlipidemia LDL goal <100  Comment: check lab and refilled med  Plan: Lipid panel reflex to direct LDL Fasting            (E78.5) Hyperlipidemia LDL goal <130  Comment:   Plan: simvastatin (ZOCOR) 20 MG tablet, simvastatin         (ZOCOR) 40 MG  "tablet, OFFICE/OUTPT         VISIT,ALEX BURNETT III            (Z13.1) Screening for diabetes mellitus  Comment:   Plan: Glucose              End of Life Planning:  Patient currently has an advanced directive: No.  I have verified the patient's ablity to prepare an advanced directive/make health care decisions.  Literature was provided to assist patient in preparing an advanced directive.    COUNSELING:  Reviewed preventive health counseling, as reflected in patient instructions       Regular exercise       Healthy diet/nutrition       Vision screening       Hearing screening       Dental care    Estimated body mass index is 31.36 kg/m  as calculated from the following:    Height as of this encounter: 1.645 m (5' 4.75\").    Weight as of this encounter: 84.8 kg (187 lb).    Weight management plan: diet and activity     reports that he has never smoked. He has never used smokeless tobacco.      Appropriate preventive services were discussed with this patient, including applicable screening as appropriate for cardiovascular disease, diabetes, osteopenia/osteoporosis, and glaucoma.  As appropriate for age/gender, discussed screening for colorectal cancer, prostate cancer, breast cancer, and cervical cancer. Checklist reviewing preventive services available has been given to the patient.    Reviewed patients plan of care and provided an AVS. The Basic Care Plan (routine screening as documented in Health Maintenance) for Yusef meets the Care Plan requirement. This Care Plan has been established and reviewed with the Patient.    Counseling Resources:  ATP IV Guidelines  Pooled Cohorts Equation Calculator  Breast Cancer Risk Calculator  FRAX Risk Assessment  ICSI Preventive Guidelines  Dietary Guidelines for Americans, 2010  USDA's MyPlate  ASA Prophylaxis  Lung CA Screening    Yusef Katz MD  [facility]  "

## 2019-08-20 DIAGNOSIS — E03.9 HYPOTHYROIDISM, UNSPECIFIED TYPE: ICD-10-CM

## 2019-08-20 DIAGNOSIS — E78.5 HYPERLIPIDEMIA LDL GOAL <130: ICD-10-CM

## 2019-08-21 RX ORDER — SIMVASTATIN 40 MG
TABLET ORAL
Qty: 30 TABLET | OUTPATIENT
Start: 2019-08-21

## 2019-08-21 RX ORDER — LEVOTHYROXINE SODIUM 50 UG/1
TABLET ORAL
Qty: 30 TABLET | OUTPATIENT
Start: 2019-08-21

## 2019-08-21 RX ORDER — SIMVASTATIN 20 MG
TABLET ORAL
Qty: 30 TABLET | OUTPATIENT
Start: 2019-08-21

## 2019-09-25 ENCOUNTER — APPOINTMENT (OUTPATIENT)
Dept: ULTRASOUND IMAGING | Facility: CLINIC | Age: 44
End: 2019-09-25
Attending: INTERNAL MEDICINE
Payer: COMMERCIAL

## 2019-09-25 ENCOUNTER — APPOINTMENT (OUTPATIENT)
Dept: CT IMAGING | Facility: CLINIC | Age: 44
End: 2019-09-25
Attending: NURSE PRACTITIONER
Payer: COMMERCIAL

## 2019-09-25 ENCOUNTER — NURSE TRIAGE (OUTPATIENT)
Dept: FAMILY MEDICINE | Facility: CLINIC | Age: 44
End: 2019-09-25

## 2019-09-25 ENCOUNTER — HOSPITAL ENCOUNTER (OUTPATIENT)
Facility: CLINIC | Age: 44
Setting detail: OBSERVATION
Discharge: HOME | End: 2019-09-26
Attending: NURSE PRACTITIONER | Admitting: INTERNAL MEDICINE
Payer: COMMERCIAL

## 2019-09-25 DIAGNOSIS — I26.99 PULMONARY EMBOLI (H): ICD-10-CM

## 2019-09-25 DIAGNOSIS — I26.99 ACUTE PULMONARY EMBOLISM WITHOUT ACUTE COR PULMONALE, UNSPECIFIED PULMONARY EMBOLISM TYPE (H): Primary | ICD-10-CM

## 2019-09-25 DIAGNOSIS — I26.99 PULMONARY EMBOLISM WITHOUT ACUTE COR PULMONALE, UNSPECIFIED CHRONICITY, UNSPECIFIED PULMONARY EMBOLISM TYPE (H): ICD-10-CM

## 2019-09-25 LAB
ANION GAP SERPL CALCULATED.3IONS-SCNC: 6 MMOL/L (ref 3–14)
BASOPHILS # BLD AUTO: 0.1 10E9/L (ref 0–0.2)
BASOPHILS NFR BLD AUTO: 0.8 %
BUN SERPL-MCNC: 17 MG/DL (ref 7–30)
CALCIUM SERPL-MCNC: 9 MG/DL (ref 8.5–10.1)
CHLORIDE SERPL-SCNC: 104 MMOL/L (ref 94–109)
CO2 SERPL-SCNC: 29 MMOL/L (ref 20–32)
CREAT SERPL-MCNC: 0.79 MG/DL (ref 0.66–1.25)
D DIMER PPP FEU-MCNC: 0.6 UG/ML FEU (ref 0–0.5)
DIFFERENTIAL METHOD BLD: NORMAL
EOSINOPHIL # BLD AUTO: 0.2 10E9/L (ref 0–0.7)
EOSINOPHIL NFR BLD AUTO: 1.8 %
ERYTHROCYTE [DISTWIDTH] IN BLOOD BY AUTOMATED COUNT: 11.8 % (ref 10–15)
GFR SERPL CREATININE-BSD FRML MDRD: >90 ML/MIN/{1.73_M2}
GLUCOSE SERPL-MCNC: 97 MG/DL (ref 70–99)
HCT VFR BLD AUTO: 48.8 % (ref 40–53)
HGB BLD-MCNC: 16 G/DL (ref 13.3–17.7)
IMM GRANULOCYTES # BLD: 0 10E9/L (ref 0–0.4)
IMM GRANULOCYTES NFR BLD: 0.3 %
INR PPP: 0.89 (ref 0.86–1.14)
LYMPHOCYTES # BLD AUTO: 1.4 10E9/L (ref 0.8–5.3)
LYMPHOCYTES NFR BLD AUTO: 14.9 %
MCH RBC QN AUTO: 28.9 PG (ref 26.5–33)
MCHC RBC AUTO-ENTMCNC: 32.8 G/DL (ref 31.5–36.5)
MCV RBC AUTO: 88 FL (ref 78–100)
MONOCYTES # BLD AUTO: 0.8 10E9/L (ref 0–1.3)
MONOCYTES NFR BLD AUTO: 9 %
NEUTROPHILS # BLD AUTO: 6.8 10E9/L (ref 1.6–8.3)
NEUTROPHILS NFR BLD AUTO: 73.2 %
NRBC # BLD AUTO: 0 10*3/UL
NRBC BLD AUTO-RTO: 0 /100
PLATELET # BLD AUTO: 227 10E9/L (ref 150–450)
POTASSIUM SERPL-SCNC: 3.8 MMOL/L (ref 3.4–5.3)
RADIOLOGIST FLAGS: ABNORMAL
RBC # BLD AUTO: 5.53 10E12/L (ref 4.4–5.9)
SODIUM SERPL-SCNC: 139 MMOL/L (ref 133–144)
TROPONIN I SERPL-MCNC: <0.015 UG/L (ref 0–0.04)
WBC # BLD AUTO: 9.3 10E9/L (ref 4–11)

## 2019-09-25 PROCEDURE — G0378 HOSPITAL OBSERVATION PER HR: HCPCS

## 2019-09-25 PROCEDURE — 85610 PROTHROMBIN TIME: CPT | Performed by: INTERNAL MEDICINE

## 2019-09-25 PROCEDURE — 99285 EMERGENCY DEPT VISIT HI MDM: CPT | Mod: 25 | Performed by: STUDENT IN AN ORGANIZED HEALTH CARE EDUCATION/TRAINING PROGRAM

## 2019-09-25 PROCEDURE — 80048 BASIC METABOLIC PNL TOTAL CA: CPT | Performed by: NURSE PRACTITIONER

## 2019-09-25 PROCEDURE — 93010 ELECTROCARDIOGRAM REPORT: CPT | Mod: Z6 | Performed by: STUDENT IN AN ORGANIZED HEALTH CARE EDUCATION/TRAINING PROGRAM

## 2019-09-25 PROCEDURE — 25000125 ZZHC RX 250: Performed by: NURSE PRACTITIONER

## 2019-09-25 PROCEDURE — 96372 THER/PROPH/DIAG INJ SC/IM: CPT | Performed by: STUDENT IN AN ORGANIZED HEALTH CARE EDUCATION/TRAINING PROGRAM

## 2019-09-25 PROCEDURE — 85025 COMPLETE CBC W/AUTO DIFF WBC: CPT | Performed by: NURSE PRACTITIONER

## 2019-09-25 PROCEDURE — 71275 CT ANGIOGRAPHY CHEST: CPT

## 2019-09-25 PROCEDURE — 99220 ZZC INITIAL OBSERVATION CARE,LEVL III: CPT | Performed by: INTERNAL MEDICINE

## 2019-09-25 PROCEDURE — 93970 EXTREMITY STUDY: CPT

## 2019-09-25 PROCEDURE — 84484 ASSAY OF TROPONIN QUANT: CPT | Performed by: NURSE PRACTITIONER

## 2019-09-25 PROCEDURE — 93005 ELECTROCARDIOGRAM TRACING: CPT | Performed by: STUDENT IN AN ORGANIZED HEALTH CARE EDUCATION/TRAINING PROGRAM

## 2019-09-25 PROCEDURE — 25000128 H RX IP 250 OP 636: Performed by: NURSE PRACTITIONER

## 2019-09-25 PROCEDURE — 85379 FIBRIN DEGRADATION QUANT: CPT | Performed by: NURSE PRACTITIONER

## 2019-09-25 PROCEDURE — 25000132 ZZH RX MED GY IP 250 OP 250 PS 637: Performed by: INTERNAL MEDICINE

## 2019-09-25 RX ORDER — WARFARIN SODIUM 3 MG/1
6 TABLET ORAL
Status: DISCONTINUED | OUTPATIENT
Start: 2019-09-25 | End: 2019-09-25 | Stop reason: DRUGHIGH

## 2019-09-25 RX ORDER — IBUPROFEN 400 MG/1
400 TABLET, FILM COATED ORAL EVERY 6 HOURS PRN
Status: DISCONTINUED | OUTPATIENT
Start: 2019-09-25 | End: 2019-09-26 | Stop reason: HOSPADM

## 2019-09-25 RX ORDER — NALOXONE HYDROCHLORIDE 0.4 MG/ML
.1-.4 INJECTION, SOLUTION INTRAMUSCULAR; INTRAVENOUS; SUBCUTANEOUS
Status: DISCONTINUED | OUTPATIENT
Start: 2019-09-25 | End: 2019-09-26 | Stop reason: HOSPADM

## 2019-09-25 RX ORDER — IOPAMIDOL 755 MG/ML
80 INJECTION, SOLUTION INTRAVASCULAR ONCE
Status: COMPLETED | OUTPATIENT
Start: 2019-09-25 | End: 2019-09-25

## 2019-09-25 RX ORDER — ACETAMINOPHEN 650 MG/1
650 SUPPOSITORY RECTAL EVERY 4 HOURS PRN
Status: DISCONTINUED | OUTPATIENT
Start: 2019-09-25 | End: 2019-09-26 | Stop reason: HOSPADM

## 2019-09-25 RX ORDER — ONDANSETRON 2 MG/ML
4 INJECTION INTRAMUSCULAR; INTRAVENOUS EVERY 6 HOURS PRN
Status: DISCONTINUED | OUTPATIENT
Start: 2019-09-25 | End: 2019-09-26 | Stop reason: HOSPADM

## 2019-09-25 RX ORDER — HYDROXYZINE HYDROCHLORIDE 50 MG/1
50 TABLET, FILM COATED ORAL 3 TIMES DAILY PRN
Status: DISCONTINUED | OUTPATIENT
Start: 2019-09-25 | End: 2019-09-26 | Stop reason: HOSPADM

## 2019-09-25 RX ORDER — ATOMOXETINE 80 MG/1
80 CAPSULE ORAL DAILY
Status: DISCONTINUED | OUTPATIENT
Start: 2019-09-25 | End: 2019-09-26 | Stop reason: HOSPADM

## 2019-09-25 RX ORDER — ONDANSETRON 4 MG/1
4 TABLET, ORALLY DISINTEGRATING ORAL EVERY 6 HOURS PRN
Status: DISCONTINUED | OUTPATIENT
Start: 2019-09-25 | End: 2019-09-26 | Stop reason: HOSPADM

## 2019-09-25 RX ORDER — NALTREXONE HYDROCHLORIDE 50 MG/1
50 TABLET, FILM COATED ORAL EVERY EVENING
Status: DISCONTINUED | OUTPATIENT
Start: 2019-09-25 | End: 2019-09-26 | Stop reason: HOSPADM

## 2019-09-25 RX ORDER — CITALOPRAM HYDROBROMIDE 40 MG/1
40 TABLET ORAL EVERY EVENING
Status: DISCONTINUED | OUTPATIENT
Start: 2019-09-25 | End: 2019-09-26 | Stop reason: HOSPADM

## 2019-09-25 RX ORDER — OXYCODONE HYDROCHLORIDE 5 MG/1
5-10 TABLET ORAL
Status: DISCONTINUED | OUTPATIENT
Start: 2019-09-25 | End: 2019-09-25

## 2019-09-25 RX ORDER — ARIPIPRAZOLE 10 MG/1
20 TABLET ORAL EVERY EVENING
Status: DISCONTINUED | OUTPATIENT
Start: 2019-09-25 | End: 2019-09-26 | Stop reason: HOSPADM

## 2019-09-25 RX ORDER — ACETAMINOPHEN 325 MG/1
650 TABLET ORAL EVERY 4 HOURS PRN
Status: DISCONTINUED | OUTPATIENT
Start: 2019-09-25 | End: 2019-09-26 | Stop reason: HOSPADM

## 2019-09-25 RX ORDER — ARIPIPRAZOLE 20 MG/1
20 TABLET ORAL DAILY
COMMUNITY
Start: 2019-09-19

## 2019-09-25 RX ORDER — SIMVASTATIN 40 MG
40 TABLET ORAL EVERY EVENING
Status: DISCONTINUED | OUTPATIENT
Start: 2019-09-25 | End: 2019-09-26 | Stop reason: HOSPADM

## 2019-09-25 RX ORDER — LEVOTHYROXINE SODIUM 50 UG/1
50 TABLET ORAL DAILY
Status: DISCONTINUED | OUTPATIENT
Start: 2019-09-26 | End: 2019-09-26 | Stop reason: HOSPADM

## 2019-09-25 RX ORDER — HYDROXYZINE HYDROCHLORIDE 50 MG/1
50 TABLET, FILM COATED ORAL 3 TIMES DAILY PRN
COMMUNITY
Start: 2019-09-19 | End: 2023-07-31

## 2019-09-25 RX ORDER — WARFARIN SODIUM 7.5 MG/1
7.5 TABLET ORAL
Status: COMPLETED | OUTPATIENT
Start: 2019-09-25 | End: 2019-09-25

## 2019-09-25 RX ADMIN — IOPAMIDOL 80 ML: 755 INJECTION, SOLUTION INTRAVENOUS at 13:09

## 2019-09-25 RX ADMIN — SODIUM CHLORIDE 100 ML: 9 INJECTION, SOLUTION INTRAVENOUS at 13:09

## 2019-09-25 RX ADMIN — SIMVASTATIN 40 MG: 40 TABLET, FILM COATED ORAL at 17:21

## 2019-09-25 RX ADMIN — ENOXAPARIN SODIUM 80 MG: 80 INJECTION SUBCUTANEOUS at 14:16

## 2019-09-25 RX ADMIN — ARIPIPRAZOLE 20 MG: 10 TABLET ORAL at 17:22

## 2019-09-25 RX ADMIN — CITALOPRAM HYDROBROMIDE 40 MG: 40 TABLET ORAL at 17:21

## 2019-09-25 RX ADMIN — WARFARIN SODIUM 7.5 MG: 7.5 TABLET ORAL at 17:21

## 2019-09-25 RX ADMIN — ATOMOXETINE 80 MG: 80 CAPSULE ORAL at 15:58

## 2019-09-25 RX ADMIN — NALTREXONE HYDROCHLORIDE 50 MG: 50 TABLET, FILM COATED ORAL at 17:22

## 2019-09-25 ASSESSMENT — MIFFLIN-ST. JEOR
SCORE: 1640.15
SCORE: 1643.91

## 2019-09-25 NOTE — TELEPHONE ENCOUNTER
Reason for call:  Patient reporting a symptom    Symptom or request: Left chest pain and coughing up blood started today, denies SOB transfer to RN    Duration (how long have symptoms been present): Today    Have you been treated for this before? No    Additional comments:     Phone Number patient can be reached at:  Home number on file [phone] (home)    Best Time:  any    Can we leave a detailed message on this number:  YES    Call taken on 9/25/2019 at 10:23 AM by Priscilla Alexandre

## 2019-09-25 NOTE — PHARMACY-ANTICOAGULATION SERVICE
Clinical Pharmacy - Warfarin Dosing Consult     Pharmacy has been consulted to manage this patient s warfarin therapy.  Indication: DVT/ PE Treatment  Therapy Goal: INR 2-3  Provider/Team: [facility]  OP Anticoag Clinic: Will be [facility]  Warfarin Prior to Admission: No      INR   Date Value Ref Range Status   09/25/2019 0.89 0.86 - 1.14 Final       Recommend warfarin 7.5 mg today.  Pharmacy will monitor Yusef Alvares daily and order warfarin doses to achieve specified goal.      Please contact pharmacy as soon as possible if the warfarin needs to be held for a procedure or if the warfarin goals change.      Angelina Prince, Pharm.D.

## 2019-09-25 NOTE — ED NOTES
"Pt c/o lt side rib pain that awoke pt at 2 am.  No diff breathing but pain increases with deep breath.  Cough with \"a little amount of blood in my spit.\"  No recent illness or fevers.  "

## 2019-09-25 NOTE — H&P
[facility] History and Physical    Yusef Alvares MRN# [id number]   Age: 43 year old YOB: 1975     Date of Admission:  9/25/2019    Home clinic: [facility]  Primary care provider: Yusef Katz          Chief Complaint:   L sided chest pain    History is obtained from the patient          History of Present Illness:   This patient is a 43 year old  male without a significant past medical history who presents with acute onset L sided chest pain after he woke up this AM. He also started having bloody sputum-thought he had a flu and came in to ED. No recent travel. No t[place]. Has sedentary lifestyle-sits at his computer for 10hrs sometime-stays in his room all day-just sedentary. No fever/chills/n/v/d.            Past Medical History:     Patient Active Problem List    Diagnosis Date Noted     Schizophrenia (H) 03/07/2011     Priority: High     Obesity (BMI 30.0-34.9) 03/21/2017     Priority: Medium     Lesions of both ulnar nerves 11/02/2015     Priority: Medium     Right-sided low back pain with right-sided sciatica 11/02/2015     Priority: Medium     CARDIOVASCULAR SCREENING; LDL GOAL LESS THAN 130 10/30/2015     Priority: Medium     Schizoaffective disorder (H) 12/09/2014     Priority: Medium     Problem list name updated by automated process. Provider to review  Diagnosis updated by automated process. Provider to review and confirm.       ABDULKADIR (obstructive sleep apnea) 10/10/2013     Priority: Medium     [facility] 8/10/2008  CPAP titration 7cmH2O       TB (tuberculosis) contact 02/20/2013     Priority: Medium     INH treatment 2000    Dentist at [facility] need negative mantoux or chest xray before they will do work   Phone [phone] fax# [phone]       Hypothyroidism 01/16/2012     Priority: Medium     Weight gain 03/07/2011     Priority: Medium     Hyperlipidemia LDL goal <130 03/07/2011     Priority: Medium     Acne scarring 03/07/2011      Priority: Medium               Past Surgical History:      Past Surgical History:   Procedure Laterality Date     DENTAL SURGERY      Extraction of abscess tooth     WISDOM ST GUIDEWIRE               Social History:     Social History     Socioeconomic History     Marital status: Single     Spouse name: Not on file     Number of children: Not on file     Years of education: Not on file     Highest education level: Not on file   Occupational History     Not on file   Social Needs     Financial resource strain: Not on file     Food insecurity:     Worry: Not on file     Inability: Not on file     Transportation needs:     Medical: Not on file     Non-medical: Not on file   Tobacco Use     Smoking status: Never Smoker     Smokeless tobacco: Never Used   Substance and Sexual Activity     Alcohol use: No     Alcohol/week: 0.0 standard drinks     Drug use: No     Sexual activity: Not Currently     Partners: Female   Lifestyle     Physical activity:     Days per week: Not on file     Minutes per session: Not on file     Stress: Not on file   Relationships     Social connections:     Talks on phone: Not on file     Gets together: Not on file     Attends Cheondoism service: Not on file     Active member of club or organization: Not on file     Attends meetings of clubs or organizations: Not on file     Relationship status: Not on file     Intimate partner violence:     Fear of current or ex partner: Not on file     Emotionally abused: Not on file     Physically abused: Not on file     Forced sexual activity: Not on file   Other Topics Concern     Parent/sibling w/ CABG, MI or angioplasty before 65F 55M? No   Social History Narrative     Not on file             Family History:     Family History   Problem Relation Age of Onset     Psychotic Disorder Mother         pre-schizophrenia     Breast Cancer Mother      Respiratory Mother         copd.   Sleep apnea     Cardiovascular Mother         aneurysms x 2     Hypertension  Mother      Hyperlipidemia Mother      Alcohol/Drug Father      Respiratory Father         copd     Asthma Father      Gastrointestinal Disease Father         appendectomy     Hypertension Father      Hyperlipidemia Father      Alcohol/Drug Maternal Grandmother         cirrhosis of liver     Depression Maternal Grandfather      Arthritis Maternal Grandfather         hip replacement     Alzheimer Disease Maternal Grandfather      Cancer Paternal Grandmother      Asthma Brother      Respiratory Brother         copd             Allergies:   No Known Allergies          Medications:     Prior to Admission medications    Medication Sig Last Dose Taking? Auth Provider   ARIPiprazole (ABILIFY) 20 MG tablet Take 20 mg by mouth daily 9/24/2019 at pm Yes Reported, Patient   atomoxetine (STRATTERA) 80 MG capsule Take 80 mg by mouth daily 9/24/2019 at am Yes Reported, Patient   citalopram (CELEXA) 40 MG tablet Take 40 mg by mouth daily. 9/24/2019 at pm Yes Reported, Patient   levothyroxine (SYNTHROID/LEVOTHROID) 50 MCG tablet TAKE 1 TABLET(50 MCG) BY MOUTH DAILY 9/25/2019 at am Yes Yusef Katz MD   naltrexone (DEPADE;REVIA) 50 MG tablet Take 50 mg by mouth daily 9/24/2019 at pm Yes Reported, Patient   simvastatin (ZOCOR) 20 MG tablet TAKE 1 TABLET BY MOUTH AT BEDTIME WITH A 40 MG TABLET FOR A TOTAL DAILY DOSE OF 60MG 9/24/2019 at pm Yes Yusef Katz MD   simvastatin (ZOCOR) 40 MG tablet TAKE 1 TABLET BY MOUTH DAILY AT BEDTIME WITH A 20MG TABLET FOR A TOTAL DAILY DOSE OF 60MG 9/24/2019 at pm Yes Yusef Katz MD   triamcinolone (KENALOG) 0.1 % external cream Apply topically 2 times daily As needed on hands and right leg Past Week at Unknown time Yes Yusef Katz MD   hydrOXYzine (ATARAX) 50 MG tablet Take 50 mg by mouth 3 times daily as needed More than a month at Unknown time  Reported, Patient            Review of Systems:   The Review of Systems is negative in ALL other than noted in the HPI          " Physical Exam:   Blood pressure (!) 161/98, pulse 92, temperature 98  F (36.7  C), temperature source Oral, height 1.626 m (5' 4\"), weight 83.9 kg (185 lb), SpO2 91 %.  GENERAL APPEARANCE: healthy, alert and no distress  EYES: conjunctiva clear, eyes grossly normal  HENT: external ears and nose normal   NECK: supple, no masses or adenopathy  RESP: lungs clear to auscultation - no rales, rhonchi or wheezes  CV: regular rate and rhythm, normal S1 S2, no S3 or S4 and no murmur, click or rub   ABDOMEN: soft, nontender, no HSM or masses and bowel sounds normal  MS: no clubbing, cyanosis; no edema  SKIN: clear without significant rashes or lesions  NEURO: Normal strength and tone, sensory exam grossly normal, mentation intact and speech normal         Data:     Lab Results   Component Value Date    WBC 9.3 09/25/2019    HGB 16.0 09/25/2019    HCT 48.8 09/25/2019    MCV 88 09/25/2019     09/25/2019     Lab Results   Component Value Date     09/25/2019    CO2 29 09/25/2019     Lab Results   Component Value Date    BUN 17 09/25/2019     No components found for: SEDRATE  No components found for: DDIMER  No results found for: BNP  Lab Results   Component Value Date    TSH 1.94 09/24/2018     No results found for: TROPONIN  UA RESULTS:  No results for input(s): COLOR, APPEARANCE, URINEGLC, URINEBILI, URINEKETONE, SG, UBLD, URINEPH, PROTEIN, UROBILINOGEN, NITRITE, LEUKEST, RBCU, WBCU in the last 18365 hours.  Liver Function Studies -   Recent Labs   Lab Test 09/24/18  0839   PROTTOTAL 7.3   ALBUMIN 4.0   BILITOTAL 1.7*   ALKPHOS 48   AST 19   ALT 29       EKG results: SR    RADIOLOGY:  CT CHEST- IMPRESSION: Bilateral subsegmental pulmonary emboli    A/P  ACUTE PE  Presenting with acute onset chest pain this AM along with hemoptysis. CT chest shows B PE-see report above. Sedentary lifestyle-sitting at computer for 4-10hr/ day likely precipitated it. Is tachycardic and hypertensive. Has no CT evidence of R hrt " strain.  Started on lovenox in ED. Insurance has zero co-pay for NOACs. Will have to start with lovenox/ coumadin and f/u in clinic-could re-assess NOAC coverage in future.  Will get US LE . Place on tele. Get ECHO in AM    HTN/ TACHYCARDIA  Has no h/o this. Likely due to PE/ pain. BP in clinic 8/19 was nl  -watch. If persistent, would start low dose bblocker. folow ECHO in AM.    HLD  Continue meds. May need to verify dose    ABDULKADIR  Uses CPAP at home. Will have RT to start home setting CPAP    SCHIZOPHRENIA  Continue meds    HYPO T4  Continue meds    DISPO  Could be discharged in AM on lovenox/ coumadin if stable.    DVT PROF-     Lily Shannon MD  [phone]

## 2019-09-25 NOTE — TELEPHONE ENCOUNTER
S:  Patient calling with status update    B:  Patient states he has chest pain on his left side and has been coughing up bloody sputum both starting today    A:  Patient is in emergent state, possible PE?    R:  Writer instructed patient to call 911 for emergency transport.  Patient was in agreement with this plan and stated that he would do this.    No further action necessary.  Encounter closed.    Real Bailon RN

## 2019-09-25 NOTE — PROGRESS NOTES
"[facility] ADMISSION NOTE    Patient admitted to room 2307 at approximately 1445 via wheel chair from emergency room. Patient was accompanied by other:none.     Verbal SBAR report received from KARTHIK Valdez prior to patient arrival.     Patient ambulated to bed independently. Patient alert and oriented X 3. The patient is not having any pain. 0-10 Pain Scale: 6. Admission vital signs: Blood pressure (!) 140/84, pulse 84, temperature 98.7  F (37.1  C), temperature source Oral, height 1.645 m (5' 4.75\"), weight 83.1 kg (183 lb 3.2 oz), SpO2 92 %. Patient was oriented to plan of care, call light, bed controls, tv, telephone, bathroom and visiting hours.     Risk Assessment    The following safety risks were identified during admission: none. Yellow risk band applied: NO.     Skin Initial Assessment    This writer admitted this patient and completed a full skin assessment and Chon score in the Adult PCS flowsheet. Appropriate interventions initiated as needed.     Patient has various scabs, bruises and some scarring on his back (from acne) His private parts were not assessed, pt declines any issues in this area.     Secondary skin check completed by Ella Givens Risk Assessment  Sensory Perception: 4-->no impairment  Moisture: 4-->rarely moist  Activity: 3-->walks occasionally  Mobility: 4-->no limitation  Nutrition: 3-->adequate  Friction and Shear: 3-->no apparent problem  Chon Score: 21  Bed Support Surface: Atmos Air mattress  Reassessed using Bed Algorithm: No    Stephenie Ortega RN    "

## 2019-09-25 NOTE — PLAN OF CARE
Patient had first dose of lovenox in the EMERGENCY DEPARTMENT. He will need teaching regarding the administration of this medication. Gave all home medications ordered per MAR. Up independent in his room, will call staff if he needs.

## 2019-09-25 NOTE — ED PROVIDER NOTES
History     Chief Complaint   Patient presents with     Rib Pain     HPI  Yusef Alvares is a 43 year old male with past medical history of schizophrenia, obesity, schizoaffective disorder and obstructive sleep apnea who presents to the emergency department with cough, left-sided rib pain that worsens with movement, and reports of shortness of breath and difficulty breathing.  Patient states he noted the symptoms first at 2 AM when he woke up coughing.  Patient states that subsequently he went back to bed and then was over at his friend's house and began coughing again around 10 AM where he noticed some blood-tinged mucus with coughing.    Patient denies previous history of similar problems.  Patient denies any history of bleeding or clotting disorders.    Patient reports feeling well otherwise.  Patient reports feeling fine presently and denies any shortness of breath presently.      Allergies:  No Known Allergies    Problem List:    Patient Active Problem List    Diagnosis Date Noted     Schizophrenia (H) 03/07/2011     Priority: High     Long term current use of anticoagulant therapy 09/26/2019     Priority: Medium     Pulmonary embolism (H) 09/25/2019     Priority: Medium     Obesity (BMI 30.0-34.9) 03/21/2017     Priority: Medium     Lesions of both ulnar nerves 11/02/2015     Priority: Medium     Right-sided low back pain with right-sided sciatica 11/02/2015     Priority: Medium     CARDIOVASCULAR SCREENING; LDL GOAL LESS THAN 130 10/30/2015     Priority: Medium     Schizoaffective disorder (H) 12/09/2014     Priority: Medium     Problem list name updated by automated process. Provider to review  Diagnosis updated by automated process. Provider to review and confirm.       ABDULKADIR (obstructive sleep apnea) 10/10/2013     Priority: Medium     [facility] 8/10/2008  CPAP titration 7cmH2O       TB (tuberculosis) contact 02/20/2013     Priority: Medium     INH treatment 2000    Dentist at [facility]  need negative mantoux or chest xray before they will do work   Phone [phone] fax# [phone]       Hypothyroidism 01/16/2012     Priority: Medium     Weight gain 03/07/2011     Priority: Medium     Hyperlipidemia LDL goal <130 03/07/2011     Priority: Medium     Acne scarring 03/07/2011     Priority: Medium        Past Medical History:    Past Medical History:   Diagnosis Date     Asperger's syndrome 2009     Bipolar affective disorder (H)      Hyperlipidemia      Mental health problem      ABDULKADIR (obstructive sleep apnea) 2008 / 2015     Schizoaffective disorder (H)      Scoliosis 2004     Thyroid disease        Past Surgical History:    Past Surgical History:   Procedure Laterality Date     DENTAL SURGERY      Extraction of abscess tooth     WISDOM ST GUIDEWIRE         Family History:    Family History   Problem Relation Age of Onset     Psychotic Disorder Mother         pre-schizophrenia     Breast Cancer Mother      Respiratory Mother         copd.   Sleep apnea     Cardiovascular Mother         aneurysms x 2     Hypertension Mother      Hyperlipidemia Mother      Alcohol/Drug Father      Respiratory Father         copd     Asthma Father      Gastrointestinal Disease Father         appendectomy     Hypertension Father      Hyperlipidemia Father      Alcohol/Drug Maternal Grandmother         cirrhosis of liver     Depression Maternal Grandfather      Arthritis Maternal Grandfather         hip replacement     Alzheimer Disease Maternal Grandfather      Cancer Paternal Grandmother      Asthma Brother      Respiratory Brother         copd       Social History:  Marital Status:  Single [1]  Social History     Tobacco Use     Smoking status: Never Smoker     Smokeless tobacco: Never Used   Substance Use Topics     Alcohol use: No     Alcohol/week: 0.0 standard drinks     Drug use: No        Medications:    acetaminophen (TYLENOL) 325 MG tablet  ARIPiprazole (ABILIFY) 20 MG tablet  atomoxetine (STRATTERA) 80 MG  "capsule  citalopram (CELEXA) 40 MG tablet  enoxaparin (LOVENOX) 80 MG/0.8ML syringe  levothyroxine (SYNTHROID/LEVOTHROID) 50 MCG tablet  naltrexone (DEPADE;REVIA) 50 MG tablet  simvastatin (ZOCOR) 20 MG tablet  simvastatin (ZOCOR) 40 MG tablet  triamcinolone (KENALOG) 0.1 % external cream  warfarin ANTICOAGULANT (COUMADIN) 5 MG tablet  hydrOXYzine (ATARAX) 50 MG tablet          Review of Systems   Constitutional: Negative for chills, diaphoresis and fever.   HENT: Negative for ear pain and sore throat.    Eyes: Negative for pain and visual disturbance.   Respiratory: Positive for cough and shortness of breath. Negative for wheezing.         Blood tinged mucous     Cardiovascular: Positive for chest pain (left lower rib).   Gastrointestinal: Negative for abdominal pain, blood in stool, diarrhea, nausea and vomiting.   Genitourinary: Negative for difficulty urinating, dysuria and hematuria.   Musculoskeletal: Negative for myalgias.   Skin: Negative for rash and wound.   Neurological: Negative for speech difficulty.   Psychiatric/Behavioral: Negative for self-injury.   All other systems reviewed and are negative.      Physical Exam   BP: (!) 172/110  Pulse: 96  Heart Rate: 79  Temp: 98  F (36.7  C)  Resp: 20  Height: 162.6 cm (5' 4\")  Weight: 83.9 kg (185 lb)  SpO2: 94 %      Physical Exam  Vitals signs and nursing note reviewed.   Constitutional:       General: He is not in acute distress.     Appearance: He is well-developed. He is obese. He is not diaphoretic.   HENT:      Head: Normocephalic and atraumatic.      Right Ear: Tympanic membrane, ear canal and external ear normal.      Left Ear: Tympanic membrane, ear canal and external ear normal.      Nose: Nose normal.      Mouth/Throat:      Mouth: Mucous membranes are moist.   Eyes:      General:         Right eye: No discharge.         Left eye: No discharge.      Conjunctiva/sclera: Conjunctivae normal.   Cardiovascular:      Rate and Rhythm: Normal rate and " regular rhythm.      Heart sounds: Normal heart sounds. No murmur. No friction rub. No gallop.    Pulmonary:      Effort: Pulmonary effort is normal. No respiratory distress.      Breath sounds: Normal breath sounds. No stridor. No wheezing, rhonchi or rales.   Chest:      Chest wall: No tenderness.   Abdominal:      General: Bowel sounds are normal. There is no distension.      Palpations: There is no mass.      Tenderness: There is no tenderness. There is no guarding or rebound.   Skin:     General: Skin is warm.      Capillary Refill: Capillary refill takes less than 2 seconds.      Findings: No rash.   Neurological:      General: No focal deficit present.      Mental Status: He is alert and oriented to person, place, and time.   Psychiatric:         Mood and Affect: Mood normal.         ED Course     ED Course as of Sep 26 2034   Wed Sep 25, 2019   1328 Phone call from Joseph Medina, radiologist who advises that patient has 4-5 small pulmonary emboli without heart strain.      1358 Discussion with patient about the pulmonary embolism and inpatient versus outpatient treatment.  Patient requesting inpatient.  And patient has been borderline tachycardic and emergency room department.      1402 Dr. Shannon, hospitalist called and discussed case with her and she agrees to admission.  Admission orders placed.  She recommends initiating on Lovenox 1 mg/kg subcutaneous and they will discuss oral therapies and determine best anticoagulant therapy over the next 24 hours.        Procedures         EKG Interpretation:      EKG Number: 1  Interpreted by JANIA Rea CNP, Dr. Shay Roy MD  Symptoms at time of EKG: cough   Rhythm: Normal sinus   Rate: Normal  Axis: Normal  Ectopy: None  Conduction: Normal  ST Segments/ T Waves: No ST-T wave changes and No acute ischemic changes  Q Waves: None  Comparison to prior: No old EKG available    Clinical Impression: normal EKG    Results for orders placed or performed during  the hospital encounter of 09/25/19   CT Chest Pulmonary Embolism w Contrast   Result Value Ref Range    Radiologist flags Pulmonary emboli (AA)     Narrative    CT CHEST PULMONARY EMBOLISM WITH CONTRAST   9/25/2019 1:17 PM     HISTORY: Pulmonary embolism suspected, intermediate probability,  positive D-dimer.    TECHNIQUE: 80 mL Isovue 370. Radiation dose for this scan was reduced  using automated exposure control, adjustment of the mA and/or kV  according to patient size, or iterative reconstruction technique.    COMPARISON: None.    FINDINGS: There are several subsegmental bilateral pulmonary emboli  present bilaterally. No CT evidence of right heart strain. No evidence  of acute thoracic aortic abnormality.    No pneumothorax. No pleural or pericardial effusion. No pulmonary  nodule or mass. No thoracic or axillary adenopathy. Thyroid gland is  unremarkable. No worrisome abnormalities in the visualized portions of  the upper abdomen. Visualized bones are unremarkable.      Impression    IMPRESSION: Bilateral subsegmental pulmonary emboli.    [Critical Result: Pulmonary emboli]    Finding was identified on 9/25/2019 1:24 PM.     Renata Abad was contacted by me on 9/25/2019 1:27 PM and verbalized  understanding of the critical result.     MORE EAGLE MD   US Lower Extremity Venous Duplex Bilateral    Narrative    PROCEDURE:  Venous Doppler ultrasound of the bilateral lower extremities    DATE OF PROCEDURE:  9/25/2019 4:02 PM    CLINICAL HISTORY/INDICATION:   Rule out DVT, B PE-R/O DVT    COMPARISON:   None relevant    TECHNIQUE:   Grayscale, color-flow, and spectral waveform analysis were performed  of the deep veins of the bilateral lower extremities    FINDINGS:   The right common femoral vein, superficial femoral vein and popliteal  vein demonstrate normal compressibility, spectral waveform, color flow  and augmentation.    The left common femoral vein, superficial femoral vein and popliteal  vein  demonstrate normal compressibility, spectral waveform, color flow  and augmentation.    The right posterior tibial vein, peroneal vein, and greater saphenous  vein are compressible.    The left posterior tibial vein, peroneal vein, and greater saphenous  vein are compressible      Impression    IMPRESSION:   1. No evidence of deep venous thrombosis in the right lower extremity  2. No evidence of deep venous thrombosis in the left lower extremity    NOAM LINDQUIST MD   Troponin I   Result Value Ref Range    Troponin I ES <0.015 0.000 - 0.045 ug/L   D dimer quantitative   Result Value Ref Range    D Dimer 0.6 (H) 0.0 - 0.50 ug/ml FEU   CBC with platelets differential   Result Value Ref Range    WBC 9.3 4.0 - 11.0 10e9/L    RBC Count 5.53 4.4 - 5.9 10e12/L    Hemoglobin 16.0 13.3 - 17.7 g/dL    Hematocrit 48.8 40.0 - 53.0 %    MCV 88 78 - 100 fl    MCH 28.9 26.5 - 33.0 pg    MCHC 32.8 31.5 - 36.5 g/dL    RDW 11.8 10.0 - 15.0 %    Platelet Count 227 150 - 450 10e9/L    Diff Method Automated Method     % Neutrophils 73.2 %    % Lymphocytes 14.9 %    % Monocytes 9.0 %    % Eosinophils 1.8 %    % Basophils 0.8 %    % Immature Granulocytes 0.3 %    Nucleated RBCs 0 0 /100    Absolute Neutrophil 6.8 1.6 - 8.3 10e9/L    Absolute Lymphocytes 1.4 0.8 - 5.3 10e9/L    Absolute Monocytes 0.8 0.0 - 1.3 10e9/L    Absolute Eosinophils 0.2 0.0 - 0.7 10e9/L    Absolute Basophils 0.1 0.0 - 0.2 10e9/L    Abs Immature Granulocytes 0.0 0 - 0.4 10e9/L    Absolute Nucleated RBC 0.0    Basic metabolic panel   Result Value Ref Range    Sodium 139 133 - 144 mmol/L    Potassium 3.8 3.4 - 5.3 mmol/L    Chloride 104 94 - 109 mmol/L    Carbon Dioxide 29 20 - 32 mmol/L    Anion Gap 6 3 - 14 mmol/L    Glucose 97 70 - 99 mg/dL    Urea Nitrogen 17 7 - 30 mg/dL    Creatinine 0.79 0.66 - 1.25 mg/dL    GFR Estimate >90 >60 mL/min/[1.73_m2]    GFR Estimate If Black >90 >60 mL/min/[1.73_m2]    Calcium 9.0 8.5 - 10.1 mg/dL   INR   Result Value Ref Range     INR 0.89 0.86 - 1.14   INR   Result Value Ref Range    INR 1.12 0.86 - 1.14   Lipid panel reflex to direct LDL   Result Value Ref Range    Cholesterol 183 <200 mg/dL    Triglycerides 168 (H) <150 mg/dL    HDL Cholesterol 48 >39 mg/dL    LDL Cholesterol Calculated 101 (H) <100 mg/dL    Non HDL Cholesterol 135 (H) <130 mg/dL   Echocardiogram Complete    Narrative    [id number]  [id number]  [id number]  [id number]^YORDAN^JOYCE           [facility]  Echocardiography Laboratory  [address]  [place] [id number]        Name: YUSEF SHAFER  MRN: [id number]  : 1975  Study Date: 2019 08:04 AM  Age: 43 yrs  Gender: Male  Patient Location: [facility]  Reason For Study: Pulmonary Embolism  Ordering Physician: JOYCE FARR  Referring Physician: Yusef Katz  Performed By: Terrie Martinez RDCS     BSA: 1.9 m2  Height: 64 in  Weight: 184 lb  HR: 68  BP: 140/84 mmHg  _____________________________________________________________________________  __        Procedure  Complete Portable Echo Adult. Optison (NDC #[id number]) given intravenously.  _____________________________________________________________________________  __        Interpretation Summary     The left ventricle is normal in size.  The visual ejection fraction is estimated at 60-65%.  Diastolic Doppler findings (E/E' ratio and/or other parameters) suggest left  ventricular filling pressures are normal.  No regional wall motion abnormalities noted.  The right ventricle is normal in size and function.  Right ventricle systolic pressure estimate normal  Normal cardiac valves.  _____________________________________________________________________________  __        Left Ventricle  The left ventricle is normal in size. There is normal left ventricular wall  thickness. The visual ejection fraction is estimated at 60-65%. Diastolic  Doppler findings (E/E' ratio and/or other parameters) suggest left ventricular  filling pressures are normal. No regional wall  motion abnormalities noted.     Right Ventricle  The right ventricle is normal in size and function.     Atria  Normal left atrial size. Right atrial size is normal. There is no color  Doppler evidence of an atrial shunt.     Mitral Valve  The mitral valve is normal in structure and function. There is trace mitral  regurgitation.        Tricuspid Valve  There is trace tricuspid regurgitation. The right ventricular systolic  pressure is approximated at 17.5 mmHg plus the right atrial pressure. Right  ventricle systolic pressure estimate normal.     Aortic Valve  Normal tricuspid aortic valve. No aortic regurgitation is present.     Pulmonic Valve  There is trace pulmonic valvular regurgitation.     Vessels  Normal size aorta. The aortic root is normal size.     Pericardium  There is no pericardial effusion.        Rhythm  Sinus rhythm was noted.  _____________________________________________________________________________  __  MMode/2D Measurements & Calculations  IVSd: 1.1 cm     LVIDd: 4.2 cm  LVIDs: 3.0 cm  LVPWd: 1.1 cm  FS: 28.8 %  LV mass(C)d: 154.5 grams  LV mass(C)dI: 81.8 grams/m2  Ao root diam: 3.1 cm  LA dimension: 3.3 cm  asc Aorta Diam: 3.2 cm  LA/Ao: 1.1  LA Volume (BP): 30.0 ml  LA Volume Index (BP): 15.9 ml/m2  RWT: 0.52           Doppler Measurements & Calculations  MV E max mirna: 60.2 cm/sec  MV A max mirna: 45.4 cm/sec  MV E/A: 1.3  MV dec time: 0.23 sec  TR max mirna: 209.0 cm/sec  TR max P.5 mmHg  E/E' av.9  Lateral E/e': 5.4  Medial E/e': 6.5           _____________________________________________________________________________  __           Report approved by: Sandip Marcum 2019 10:02 AM          Results for orders placed or performed during the hospital encounter of 19 (from the past 24 hour(s))   INR   Result Value Ref Range    INR 1.12 0.86 - 1.14   Lipid panel reflex to direct LDL   Result Value Ref Range    Cholesterol 183 <200 mg/dL    Triglycerides 168 (H) <150  mg/dL    HDL Cholesterol 48 >39 mg/dL    LDL Cholesterol Calculated 101 (H) <100 mg/dL    Non HDL Cholesterol 135 (H) <130 mg/dL   Echocardiogram Complete    Narrative    [id number]  [id number]  [id number]  [id number]^YORDAN^JOYCE           [facility]  Echocardiography Laboratory  [address]  [place] [id number]        Name: YUSEF SHAFER  MRN: [id number]  : 1975  Study Date: 2019 08:04 AM  Age: 43 yrs  Gender: Male  Patient Location: [facility]  Reason For Study: Pulmonary Embolism  Ordering Physician: JOYCE FARR  Referring Physician: Yusef Katz  Performed By: Terrie Martinez RDCS     BSA: 1.9 m2  Height: 64 in  Weight: 184 lb  HR: 68  BP: 140/84 mmHg  _____________________________________________________________________________  __        Procedure  Complete Portable Echo Adult. Optison (NDC #[id number]) given intravenously.  _____________________________________________________________________________  __        Interpretation Summary     The left ventricle is normal in size.  The visual ejection fraction is estimated at 60-65%.  Diastolic Doppler findings (E/E' ratio and/or other parameters) suggest left  ventricular filling pressures are normal.  No regional wall motion abnormalities noted.  The right ventricle is normal in size and function.  Right ventricle systolic pressure estimate normal  Normal cardiac valves.  _____________________________________________________________________________  __        Left Ventricle  The left ventricle is normal in size. There is normal left ventricular wall  thickness. The visual ejection fraction is estimated at 60-65%. Diastolic  Doppler findings (E/E' ratio and/or other parameters) suggest left ventricular  filling pressures are normal. No regional wall motion abnormalities noted.     Right Ventricle  The right ventricle is normal in size and function.     Atria  Normal left atrial size. Right atrial size is normal. There is no color  Doppler  evidence of an atrial shunt.     Mitral Valve  The mitral valve is normal in structure and function. There is trace mitral  regurgitation.        Tricuspid Valve  There is trace tricuspid regurgitation. The right ventricular systolic  pressure is approximated at 17.5 mmHg plus the right atrial pressure. Right  ventricle systolic pressure estimate normal.     Aortic Valve  Normal tricuspid aortic valve. No aortic regurgitation is present.     Pulmonic Valve  There is trace pulmonic valvular regurgitation.     Vessels  Normal size aorta. The aortic root is normal size.     Pericardium  There is no pericardial effusion.        Rhythm  Sinus rhythm was noted.  _____________________________________________________________________________  __  MMode/2D Measurements & Calculations  IVSd: 1.1 cm     LVIDd: 4.2 cm  LVIDs: 3.0 cm  LVPWd: 1.1 cm  FS: 28.8 %  LV mass(C)d: 154.5 grams  LV mass(C)dI: 81.8 grams/m2  Ao root diam: 3.1 cm  LA dimension: 3.3 cm  asc Aorta Diam: 3.2 cm  LA/Ao: 1.1  LA Volume (BP): 30.0 ml  LA Volume Index (BP): 15.9 ml/m2  RWT: 0.52           Doppler Measurements & Calculations  MV E max mirna: 60.2 cm/sec  MV A max mirna: 45.4 cm/sec  MV E/A: 1.3  MV dec time: 0.23 sec  TR max mirna: 209.0 cm/sec  TR max P.5 mmHg  E/E' av.9  Lateral E/e': 5.4  Medial E/e': 6.5           _____________________________________________________________________________  __           Report approved by: Sandip Marcum 2019 10:02 AM          Medications   iopamidol (ISOVUE-370) solution 80 mL (80 mLs Intravenous Given 19 1309)   sodium chloride 0.9 % bag 500mL for CT scan flush use (100 mLs Intravenous Given 19 1309)   influenza quadrivalent (PF) vacc (FLUZONE) injection 0.5 mL (0.5 mLs Intramuscular Given 19 1014)   warfarin ANTICOAGULANT (COUMADIN) tablet 7.5 mg (7.5 mg Oral Given 19 1721)   perflutren diluted 1mL to 2mL with saline (OPTISON) diluted injection 2 mL (2 mLs Intravenous Given  9/26/19 0818)   sodium chloride (PF) 0.9% PF flush 10 mL (10 mLs Intravenous Given 9/26/19 0818)   warfarin ANTICOAGULANT (COUMADIN) tablet 7.5 mg (7.5 mg Oral Given 9/26/19 1014)       Assessments & Plan (with Medical Decision Making)     I have reviewed the nursing notes.    I have reviewed the findings, diagnosis, plan and need for follow up with the patient.  Patient presents to the emergency department with a 1 day history and sudden onset of cough with shortness of breath left-sided lower chest pain and hemoptysis.  Patient has no history of DVT or clotting disorders but does admit to sitting for prolonged time periods and playing games.  On examination lung sounds are clear and patient is intermittently borderline tachycardic.  D-dimer obtained and was elevated to be concerning for PE and CT obtained and reveals subsegmental PE.  Discussed this with patient and discussed risks and benefits of outpatient or inpatient treatment and patient requesting inpatient treatment.  Also discussed new oral novel agents versus injectables and Coumadin and risks and benefits of this and patient states he would like to initiate with Lovenox first.  Patient admitted for observation with phone call placed to Dr. Shannon who agreed to admission with observation.   Dr. Umesh Mendoza was collaborative provider on staff.      Discharge Medication List as of 9/26/2019 10:20 AM      START taking these medications    Details   acetaminophen (TYLENOL) 325 MG tablet Take 2 tablets (650 mg) by mouth every 4 hours as needed for mild pain, OTC      enoxaparin (LOVENOX) 80 MG/0.8ML syringe Inject 0.84 mLs (84 mg) Subcutaneous every 12 hours, Disp-14 Syringe, R-0, E-Prescribe      warfarin ANTICOAGULANT (COUMADIN) 5 MG tablet Take 1 tablet (5 mg) by mouth daily, Disp-30 tablet, R-1, E-Prescribe             Final diagnoses:   Pulmonary emboli (H)       9/25/2019   [facility] EMERGENCY DEPARTMENT     Renata Abad APRN  CNP  09/26/19 2034    Physician Attestation   I, Umesh Mendoza, have reviewed and discussed with the advanced practice provider their history, physical and plan for Yusef Alvares. I did not participate in a shared visit by interviewing or examining the patient and this should be billed as an advanced practice provider only visit. Plan of care discussed. I went to examine patient, however he had already been transferred to inpatient bed.    Umesh Mendoza  Date of Service (when I saw the patient): I did not personally see this patient today.          Umesh Mendoza MD  09/28/19 4722

## 2019-09-25 NOTE — ED NOTES
Patient has  [facility] to Observation  order. Patient has been given Patient Bill of Rights, Observation brochure and  What does Observation mean to me  forms.  Patient has been given the opportunity to ask questions about observation status and their plan of care.  Renata Smalls RN

## 2019-09-26 ENCOUNTER — ANTICOAGULATION THERAPY VISIT (OUTPATIENT)
Dept: ANTICOAGULATION | Facility: CLINIC | Age: 44
End: 2019-09-26

## 2019-09-26 ENCOUNTER — APPOINTMENT (OUTPATIENT)
Dept: CARDIOLOGY | Facility: CLINIC | Age: 44
End: 2019-09-26
Attending: INTERNAL MEDICINE
Payer: COMMERCIAL

## 2019-09-26 ENCOUNTER — TELEPHONE (OUTPATIENT)
Dept: ANTICOAGULATION | Facility: CLINIC | Age: 44
End: 2019-09-26

## 2019-09-26 VITALS
WEIGHT: 183.2 LBS | HEART RATE: 77 BPM | BODY MASS INDEX: 30.52 KG/M2 | HEIGHT: 65 IN | TEMPERATURE: 97.6 F | RESPIRATION RATE: 18 BRPM | DIASTOLIC BLOOD PRESSURE: 79 MMHG | OXYGEN SATURATION: 97 % | SYSTOLIC BLOOD PRESSURE: 115 MMHG

## 2019-09-26 DIAGNOSIS — Z79.01 LONG TERM CURRENT USE OF ANTICOAGULANT THERAPY: ICD-10-CM

## 2019-09-26 DIAGNOSIS — I26.99 ACUTE PULMONARY EMBOLISM WITHOUT ACUTE COR PULMONALE, UNSPECIFIED PULMONARY EMBOLISM TYPE (H): Primary | ICD-10-CM

## 2019-09-26 DIAGNOSIS — I26.99 ACUTE PULMONARY EMBOLISM WITHOUT ACUTE COR PULMONALE, UNSPECIFIED PULMONARY EMBOLISM TYPE (H): ICD-10-CM

## 2019-09-26 LAB
CHOLEST SERPL-MCNC: 183 MG/DL
HDLC SERPL-MCNC: 48 MG/DL
INR PPP: 1.12 (ref 0.86–1.14)
LDLC SERPL CALC-MCNC: 101 MG/DL
NONHDLC SERPL-MCNC: 135 MG/DL
TRIGL SERPL-MCNC: 168 MG/DL

## 2019-09-26 PROCEDURE — 25000128 H RX IP 250 OP 636: Performed by: NURSE PRACTITIONER

## 2019-09-26 PROCEDURE — 25500064 ZZH RX 255 OP 636: Performed by: FAMILY MEDICINE

## 2019-09-26 PROCEDURE — 93306 TTE W/DOPPLER COMPLETE: CPT | Mod: 26 | Performed by: INTERNAL MEDICINE

## 2019-09-26 PROCEDURE — G0378 HOSPITAL OBSERVATION PER HR: HCPCS

## 2019-09-26 PROCEDURE — 85610 PROTHROMBIN TIME: CPT | Performed by: INTERNAL MEDICINE

## 2019-09-26 PROCEDURE — 25000132 ZZH RX MED GY IP 250 OP 250 PS 637: Performed by: FAMILY MEDICINE

## 2019-09-26 PROCEDURE — 90686 IIV4 VACC NO PRSV 0.5 ML IM: CPT | Performed by: INTERNAL MEDICINE

## 2019-09-26 PROCEDURE — 36415 COLL VENOUS BLD VENIPUNCTURE: CPT | Performed by: INTERNAL MEDICINE

## 2019-09-26 PROCEDURE — 25000128 H RX IP 250 OP 636: Performed by: INTERNAL MEDICINE

## 2019-09-26 PROCEDURE — 96372 THER/PROPH/DIAG INJ SC/IM: CPT | Mod: 59

## 2019-09-26 PROCEDURE — 99217 ZZC OBSERVATION CARE DISCHARGE: CPT | Performed by: FAMILY MEDICINE

## 2019-09-26 PROCEDURE — 80061 LIPID PANEL: CPT | Performed by: FAMILY MEDICINE

## 2019-09-26 PROCEDURE — G0008 ADMIN INFLUENZA VIRUS VAC: HCPCS

## 2019-09-26 PROCEDURE — 40000264 ECHOCARDIOGRAM COMPLETE

## 2019-09-26 PROCEDURE — 25000132 ZZH RX MED GY IP 250 OP 250 PS 637: Performed by: INTERNAL MEDICINE

## 2019-09-26 RX ORDER — WARFARIN SODIUM 7.5 MG/1
7.5 TABLET ORAL ONCE
Status: COMPLETED | OUTPATIENT
Start: 2019-09-26 | End: 2019-09-26

## 2019-09-26 RX ORDER — ACETAMINOPHEN 325 MG/1
650 TABLET ORAL EVERY 4 HOURS PRN
COMMUNITY
Start: 2019-09-26 | End: 2023-07-31

## 2019-09-26 RX ORDER — WARFARIN SODIUM 5 MG/1
5 TABLET ORAL DAILY
Qty: 30 TABLET | Refills: 1 | Status: SHIPPED | OUTPATIENT
Start: 2019-09-26 | End: 2019-10-10

## 2019-09-26 RX ADMIN — LEVOTHYROXINE SODIUM 50 MCG: 50 TABLET ORAL at 06:35

## 2019-09-26 RX ADMIN — WARFARIN SODIUM 7.5 MG: 7.5 TABLET ORAL at 10:14

## 2019-09-26 RX ADMIN — INFLUENZA A VIRUS A/BRISBANE/02/2018 IVR-190 (H1N1) ANTIGEN (FORMALDEHYDE INACTIVATED), INFLUENZA A VIRUS A/KANSAS/14/2017 X-327 (H3N2) ANTIGEN (FORMALDEHYDE INACTIVATED), INFLUENZA B VIRUS B/PHUKET/3073/2013 ANTIGEN (FORMALDEHYDE INACTIVATED), AND INFLUENZA B VIRUS B/MARYLAND/15/2016 BX-69A ANTIGEN (FORMALDEHYDE INACTIVATED) 0.5 ML: 15; 15; 15; 15 INJECTION, SUSPENSION INTRAMUSCULAR at 10:14

## 2019-09-26 RX ADMIN — ATOMOXETINE 80 MG: 80 CAPSULE ORAL at 08:58

## 2019-09-26 RX ADMIN — ENOXAPARIN SODIUM 80 MG: 80 INJECTION SUBCUTANEOUS at 02:32

## 2019-09-26 RX ADMIN — HUMAN ALBUMIN MICROSPHERES AND PERFLUTREN 2 ML: 10; .22 INJECTION, SOLUTION INTRAVENOUS at 08:18

## 2019-09-26 ASSESSMENT — ENCOUNTER SYMPTOMS
DIARRHEA: 0
BLOOD IN STOOL: 0
VOMITING: 0
WHEEZING: 0
EYE PAIN: 0
DIAPHORESIS: 0
SHORTNESS OF BREATH: 1
SPEECH DIFFICULTY: 0
DIFFICULTY URINATING: 0
DYSURIA: 0
CHILLS: 0
NAUSEA: 0
MYALGIAS: 0
WOUND: 0
FEVER: 0
SORE THROAT: 0
COUGH: 1
ABDOMINAL PAIN: 0
HEMATURIA: 0

## 2019-09-26 NOTE — PLAN OF CARE
Patient is up indep in his room.patient has no c/o pain. Patient is alert and can let his needs be known. Patient is taking in orally well and voiding good amounts. Patient has call light/all questions answered.

## 2019-09-26 NOTE — DISCHARGE SUMMARY
[facility] Discharge Summary    Yusef Alvares MRN# [id number]   Age: 43 year old YOB: 1975     Date of Admission:  9/25/2019  Date of Discharge::  9/26/2019 10:45 AM  Admitting Physician:  Lily Shannon MD  Discharge Physician:  Ortiz Valdivia MD  Primary Physician: Yusef Katz       Home clinic: [facility]               Discharge Diagnosis:   Principle diagnosis: acute bilateral PEs     Secondary diagnoses:  ABDULKADIR  Schiziphrenia  hypothyroidism        Discharge Instructions:   For the pulmonary embolism   -need to be on lovenox for 7 days or longer if INR is not therapeutic by that time  -need to be on warfarin, dosing per INR clinic and have regular checks to make sure the dose is correct.   -need to be on warfarin for at least 3 months, and possibly for life, since this seems to have been unprovoked except for your sedentary lifestyle  -you may start on an exercise program any time you feel up to it, but go up slowly on what you are doing  -need to avoid, aspirin or ibuprofen / naproxen.  Use Tylenol for any pain complaints  -need to  follow up with INR clinic as recommended and  follow up with Dr Katz in the next 7 days, who  Will also go over the echo results.  .      Follow up with primary care provider in 7 days        Procedures:       Results for orders placed or performed during the hospital encounter of 09/25/19   CT Chest Pulmonary Embolism w Contrast     Value    Radiologist flags Pulmonary emboli (AA)    Narrative    CT CHEST PULMONARY EMBOLISM WITH CONTRAST   9/25/2019 1:17 PM     HISTORY: Pulmonary embolism suspected, intermediate probability,  positive D-dimer.    TECHNIQUE: 80 mL Isovue 370. Radiation dose for this scan was reduced  using automated exposure control, adjustment of the mA and/or kV  according to patient size, or iterative reconstruction technique.    COMPARISON: None.    FINDINGS: There are several subsegmental bilateral pulmonary  emboli  present bilaterally. No CT evidence of right heart strain. No evidence  of acute thoracic aortic abnormality.    No pneumothorax. No pleural or pericardial effusion. No pulmonary  nodule or mass. No thoracic or axillary adenopathy. Thyroid gland is  unremarkable. No worrisome abnormalities in the visualized portions of  the upper abdomen. Visualized bones are unremarkable.      Impression    IMPRESSION: Bilateral subsegmental pulmonary emboli.    [Critical Result: Pulmonary emboli]    Finding was identified on 9/25/2019 1:24 PM.     Renata Abad was contacted by me on 9/25/2019 1:27 PM and verbalized  understanding of the critical result.     MORE EAGLE MD   US Lower Extremity Venous Duplex Bilateral    Narrative    PROCEDURE:  Venous Doppler ultrasound of the bilateral lower extremities    DATE OF PROCEDURE:  9/25/2019 4:02 PM    CLINICAL HISTORY/INDICATION:   Rule out DVT, B PE-R/O DVT    COMPARISON:   None relevant    TECHNIQUE:   Grayscale, color-flow, and spectral waveform analysis were performed  of the deep veins of the bilateral lower extremities    FINDINGS:   The right common femoral vein, superficial femoral vein and popliteal  vein demonstrate normal compressibility, spectral waveform, color flow  and augmentation.    The left common femoral vein, superficial femoral vein and popliteal  vein demonstrate normal compressibility, spectral waveform, color flow  and augmentation.    The right posterior tibial vein, peroneal vein, and greater saphenous  vein are compressible.    The left posterior tibial vein, peroneal vein, and greater saphenous  vein are compressible      Impression    IMPRESSION:   1. No evidence of deep venous thrombosis in the right lower extremity  2. No evidence of deep venous thrombosis in the left lower extremity    NOAM LINDQUIST MD                    Allergies:    No Known Allergies               Discharge Medications:     Current Discharge Medication List       CONTINUE these medications which have NOT CHANGED    Details   ARIPiprazole (ABILIFY) 20 MG tablet Take 20 mg by mouth daily      atomoxetine (STRATTERA) 80 MG capsule Take 80 mg by mouth daily      citalopram (CELEXA) 40 MG tablet Take 40 mg by mouth daily.      levothyroxine (SYNTHROID/LEVOTHROID) 50 MCG tablet TAKE 1 TABLET(50 MCG) BY MOUTH DAILY  Qty: 90 tablet, Refills: 3    Associated Diagnoses: Hypothyroidism, unspecified type      naltrexone (DEPADE;REVIA) 50 MG tablet Take 50 mg by mouth daily      !! simvastatin (ZOCOR) 20 MG tablet TAKE 1 TABLET BY MOUTH AT BEDTIME WITH A 40 MG TABLET FOR A TOTAL DAILY DOSE OF 60MG  Qty: 90 tablet, Refills: 3    Associated Diagnoses: Hyperlipidemia LDL goal <130      !! simvastatin (ZOCOR) 40 MG tablet TAKE 1 TABLET BY MOUTH DAILY AT BEDTIME WITH A 20MG TABLET FOR A TOTAL DAILY DOSE OF 60MG  Qty: 90 tablet, Refills: 3    Associated Diagnoses: Hyperlipidemia LDL goal <130      triamcinolone (KENALOG) 0.1 % external cream Apply topically 2 times daily As needed on hands and right leg  Qty: 45 g, Refills: 1    Associated Diagnoses: Dermatitis      hydrOXYzine (ATARAX) 50 MG tablet Take 50 mg by mouth 3 times daily as needed       !! - Potential duplicate medications found. Please discuss with provider.                Consultations:   None           Brief History of Presenting Illness:   This patient is a 43 year old  male without a significant past medical history who presents with acute onset L sided chest pain after he woke up this AM. He also started having bloody sputum-thought he had a flu and came in to ED. No recent travel. No ttauma. Has sedentary lifestyle-sits at his computer for 10hrs sometime-stays in his room all day-just sedentary. No fever/chills/n/v/d.          Hospital Course:   RADIOLOGY:  CT CHEST- IMPRESSION: Bilateral subsegmental pulmonary emboli     A/P  ACUTE PE  Presenting with acute onset chest pain this AM along with hemoptysis. CT chest  "shows B PE-see report above. Sedentary lifestyle-sitting at computer for 4-10hr/ day likely precipitated it. Is tachycardic and hypertensive. Has no CT evidence of R hrt strain.  Started on lovenox in ED. Insurance has zero co-pay for NOACs. Will have to start with lovenox/ coumadin and f/u in clinic-could re-assess NOAC coverage in future.  Will get US LE . Place on tele. Get ECHO in AM     HTN/ TACHYCARDIA  Has no h/o this. Likely due to PE/ pain. BP in clinic 8/19 was nl  -watch. If persistent, would start low dose bblocker. folow ECHO in AM.  -resolved.    -echo read pending on discharge      HLD  Continue meds. May need to verify dose     ABDULKADIR  Uses CPAP at home. Will have RT to start home setting CPAP     SCHIZOPHRENIA  Continue meds     HYPO T4  Continue meds     DISPO  Could be discharged in AM on lovenox/ coumadin if stable.     DVT PROF-            Discharge Exam:   OBJECTIVE:   /79 (BP Location: Right arm)   Pulse 77   Temp 97.6  F (36.4  C) (Oral)   Resp 18   Ht 1.645 m (5' 4.75\")   Wt 83.1 kg (183 lb 3.2 oz)   SpO2 97%   BMI 30.72 kg/m      GENERAL APPEARANCE:  Alert, NAD, Ox3     RESP:clear      CV: regular rates and rhythm,no murmur, no click or rub - no edema     Abdomen: soft, nontender, no liver or spleen enlargement, no masses, BSs normal   Skin: no cyanosis, pallor, or jaundice            Pending Tests at Discharge:     Unresulted Labs Ordered in the Past 30 Days of this Admission     No orders found for last 31 day(s).                   Discharge Disposition:   Discharged to home      Attestation:  Amount of time performed on this discharge : 45 minutes.    Ortiz Valdivia MD MD      "

## 2019-09-26 NOTE — TELEPHONE ENCOUNTER
Patient was referred to the Anticoagulation Clinic ([facility]) by the hospital for new PE. [facility] needs a referral from the primary care physician for ongoing management of his warfarin dosing.    Could you please specify the anticipated length of therapy. Notes state a minimum of 3 months but possibly longer due to unprovoked clot (questioning sedentary lifestyle as a contributor)    Salvador PEREZ RN, CACP

## 2019-09-26 NOTE — PROGRESS NOTES
NATAL[facility] ORELLANA[facility] DISCHARGE NOTE    Patient discharged to home at 10:46 AM via wheel chair. Accompanied by other:Friend and staff. Discharge instructions reviewed with patient, opportunity offered to ask questions. Prescriptions sent to patients preferred pharmacy. All belongings sent with patient.    Tanesha Carrillo RN

## 2019-09-26 NOTE — PLAN OF CARE
Lovenox injection teaching done at 0200 during scheduled lovenox administration. Patient verbalized understanding. Regular diet. Up independent in room. No complaints of pain.

## 2019-09-26 NOTE — PROGRESS NOTES
Anticoag chart review after hospitalization:    Visit date(s): 9/25/19 - 9/26/19    Reason for visit: acute onset chest pain with hemoptysis. CT confirmed PE, likely due to sedentary lifestyle    New medications: warfarin, Lovenox bridge     Next INR check date: 9/30/19   This recheck date is an appropriate timeframe given the changes noted during the ED/hospitalization.       Salvador PEREZ RN, CACP

## 2019-09-30 ENCOUNTER — OFFICE VISIT (OUTPATIENT)
Dept: FAMILY MEDICINE | Facility: CLINIC | Age: 44
End: 2019-09-30
Payer: COMMERCIAL

## 2019-09-30 ENCOUNTER — ANTICOAGULATION THERAPY VISIT (OUTPATIENT)
Dept: ANTICOAGULATION | Facility: CLINIC | Age: 44
End: 2019-09-30
Payer: COMMERCIAL

## 2019-09-30 VITALS
OXYGEN SATURATION: 93 % | BODY MASS INDEX: 30.32 KG/M2 | SYSTOLIC BLOOD PRESSURE: 118 MMHG | TEMPERATURE: 98 F | HEART RATE: 69 BPM | WEIGHT: 182 LBS | HEIGHT: 65 IN | DIASTOLIC BLOOD PRESSURE: 84 MMHG

## 2019-09-30 DIAGNOSIS — I26.99 ACUTE PULMONARY EMBOLISM WITHOUT ACUTE COR PULMONALE, UNSPECIFIED PULMONARY EMBOLISM TYPE (H): ICD-10-CM

## 2019-09-30 DIAGNOSIS — I26.99 ACUTE PULMONARY EMBOLISM WITHOUT ACUTE COR PULMONALE, UNSPECIFIED PULMONARY EMBOLISM TYPE (H): Primary | ICD-10-CM

## 2019-09-30 DIAGNOSIS — Z79.01 LONG TERM CURRENT USE OF ANTICOAGULANT THERAPY: ICD-10-CM

## 2019-09-30 LAB — INR POINT OF CARE: 1.7 (ref 0.86–1.14)

## 2019-09-30 PROCEDURE — 85610 PROTHROMBIN TIME: CPT | Mod: QW

## 2019-09-30 PROCEDURE — 99214 OFFICE O/P EST MOD 30 MIN: CPT | Performed by: FAMILY MEDICINE

## 2019-09-30 PROCEDURE — 99207 ZZC NO CHARGE NURSE ONLY: CPT

## 2019-09-30 PROCEDURE — 36416 COLLJ CAPILLARY BLOOD SPEC: CPT

## 2019-09-30 ASSESSMENT — MIFFLIN-ST. JEOR: SCORE: 1638.46

## 2019-09-30 NOTE — PROGRESS NOTES
Subjective     Yusef Alvares is a 43 year old male who presents to clinic today for the following health issues:  Chief Complaint   Patient presents with     Hospital F/U       HPI     No family h/o blood clots in immediate family.  He sits at computer for a long time.  No recent trips.  US of lower extremities were negative.    Hospital Follow-up Visit:    Hospital/Nursing Home/IP Rehab Facility: [facility]  Date of Admission: 9/25/19  Date of Discharge: 9/26/19  Reason(s) for Admission: Acute bilateral PEs            Problems taking medications regularly:  None       Medication changes since discharge: None       Problems adhering to non-medication therapy:  None    Summary of hospitalization:  [facility] discharge summary reviewed  Diagnostic Tests/Treatments reviewed.  Follow up needed: see [facility], treat for 3 months and recheck CT scan and then get hematology consult if PEs are resolved  Other Healthcare Providers Involved in Patient s Care:         as above  Update since discharge: improved.     Post Discharge Medication Reconciliation: discharge medications reconciled and changed, per note/orders (see AVS).  Plan of care communicated with patient     Coding guidelines for this visit:  Type of Medical   Decision Making Face-to-Face Visit       within 7 Days of discharge Face-to-Face Visit        within 14 days of discharge   Moderate Complexity [id number]   High Complexity [id number]                      Reviewed and updated as needed this visit by Provider  Tobacco  Allergies  Meds  Problems  Med Hx  Surg Hx  Fam Hx         Review of Systems   ROS COMP: CONSTITUTIONAL:NEGATIVE for fever, chills, change in weight  INTEGUMENTARY/SKIN: NEGATIVE for worrisome rashes, moles or lesions  ENT/MOUTH: NEGATIVE for ear, mouth and throat problems  RESP:NEGATIVE for significant cough or SOB  CV: NEGATIVE for chest pain, palpitations or peripheral edema  GI: NEGATIVE for nausea, abdominal pain,  "heartburn, or change in bowel habits  MUSCULOSKELETAL: NEGATIVE for significant arthralgias or myalgia  HEME/ALLERGY/IMMUNE: NEGATIVE for bleeding problems  PSYCHIATRIC: stable      Objective    /84 (Cuff Size: Adult Large)   Pulse 69   Temp 98  F (36.7  C)   Ht 1.645 m (5' 4.75\")   Wt 82.6 kg (182 lb)   SpO2 93%   BMI 30.52 kg/m     Body mass index is 30.52 kg/m .  Physical Exam   GENERAL APPEARANCE: alert, no distress and cooperative  RESP: lungs clear to auscultation - no rales, rhonchi or wheezes  CV: regular rates and rhythm, normal S1 S2, no S3 or S4 and no murmur, click or rub  ABDOMEN: soft, nontender, without hepatosplenomegaly or masses and bowel sounds normal  MS: extremities normal- no gross deformities noted  SKIN: no suspicious lesions or rashes  NEURO: Normal strength and tone, mentation intact and speech normal  PSYCH: mentation appears normal and affect normal/bright            Assessment & Plan     (I26.99) Acute pulmonary embolism without acute cor pulmonale, unspecified pulmonary embolism type (H)  (primary encounter diagnosis)  Comment: doing well, no side effect of meds, explained plan and follow up, will need to have hematology consult.    Plan: CT Chest w Contrast               BMI:   Estimated body mass index is 30.52 kg/m  as calculated from the following:    Height as of this encounter: 1.645 m (5' 4.75\").    Weight as of this encounter: 82.6 kg (182 lb).   Weight management plan: discussed increasing activity        See Patient Instructions    Return in about 3 months (around 12/30/2019).    Yusef Katz MD  [facility]      "

## 2019-09-30 NOTE — PATIENT INSTRUCTIONS
Please continue to follow with the anticoagulation clinic for treatment of your blood clots in your lungs.    I will see you back in 3 months to recheck your lungs with a CT scan of your chest.    Follow up with me in 3 months.      Thank you for choosing [facility].  You may be receiving an email and/or telephone survey request from [facility] Customer Experience regarding your visit today.  Please take a few minutes to respond to the survey to let us know how we are doing.      If you have questions or concerns, please contact us via [employer] or you can contact your care team at [phone].    Our Clinic hours are:  Monday 6:40 am  to 7:00 pm  Tuesday -Friday 6:40 am to 5:00 pm    The [place] outpatient lab hours are:  Monday - Friday 6:10 am to 4:45 pm  Saturdays 7:00 am to 11:00 am  Appointments are required, call [phone]    If you have clinical questions after hours or would like to schedule an appointment,  call the clinic at [phone].

## 2019-09-30 NOTE — PROGRESS NOTES
ANTICOAGULATION INITIAL CLINIC VISIT    Patient Name:  Yusef Alvares  Date:  9/30/2019  Referred by: Dr. Katz  Contact Type:  Face to Face    SUBJECTIVE:  Coumadin education was completed today.  Topics covered include:  -Introduction to coumadin  -Proper Administration  -INR Testing  -Sign/Symptoms of Bleeding  -Signs/Symptoms of Clot Formation or Stroke  -Dietary Intake of Vitamin K  -Drug Interactions  -Anticoagulation Identification (bracelet, necklace or wallet card)  -Future Surgery  -Effects of Alcohol, Tobacco, and Exercise on Coumadin    Coumadin Education Booklet and Coumadin Identification Wallet Card were given to the patient.    Patient Findings     Positives:   Change in medications (bridging with Lovenox)    Comments:   Patient here for new patient consult, on warfarin/bridging with Lovenox BID for a PE. Patient is aware he will be continuing with Lovenox until INR is 2.3 or greater. Patient states he has 6 or 7 syringes remaining. Current anticipated weekly dose is 42.5 mg, will adjust patient's dose to 45 mg by next INR on Wednesday. Patient reports some bruising to abdomen from injections, no bleeding concerns reported. Patient does not smoke or drink alcohol. Patient does eat greens, discussed the importance of consistency.         Clinical Outcomes     Comments:   Patient here for new patient consult, on warfarin/bridging with Lovenox BID for a PE. Patient is aware he will be continuing with Lovenox until INR is 2.3 or greater. Patient states he has 6 or 7 syringes remaining. Current anticipated weekly dose is 42.5 mg, will adjust patient's dose to 45 mg by next INR on Wednesday. Patient reports some bruising to abdomen from injections, no bleeding concerns reported. Patient does not smoke or drink alcohol. Patient does eat greens, discussed the importance of consistency.           OBJECTIVE    INR Protime   Date Value Ref Range Status   09/30/2019 1.7 (A) 0.86 - 1.14 Final       ASSESSMENT /  PLAN  INR assessment SUB    Recheck INR In: 2 DAYS    INR Location Clinic      Anticoagulation Summary  As of 2019    INR goal:   2.0-3.0   TTR:   --   INR used for dosin.7! (2019)   Warfarin maintenance plan:   No maintenance plan   Full warfarin instructions:   : 7.5 mg; 10/1: 7.5 mg; Otherwise No maintenance plan   Next INR check:   10/2/2019   Priority:   INR   Target end date:   3/27/2020    Indications    Pulmonary embolism (H) [I26.99]  Long term current use of anticoagulant therapy [Z79.01]             Anticoagulation Episode Summary     INR check location:       Preferred lab:       Send INR reminders to:   [facility]    Comments:   * 6 month therapy      Anticoagulation Care Providers     Provider Role Specialty Phone number    Yusef Katz MD [facility] [phone]            See the Encounter Report to view Anticoagulation Flowsheet and Dosing Calendar (Go to Encounters tab in chart review, and find the Anticoagulation Therapy Visit)    Dosage adjustment made based on physician directed care plan.    Sonja Stone RN

## 2019-10-02 ENCOUNTER — ANTICOAGULATION THERAPY VISIT (OUTPATIENT)
Dept: ANTICOAGULATION | Facility: CLINIC | Age: 44
End: 2019-10-02
Payer: COMMERCIAL

## 2019-10-02 DIAGNOSIS — Z79.01 LONG TERM CURRENT USE OF ANTICOAGULANT THERAPY: ICD-10-CM

## 2019-10-02 DIAGNOSIS — I26.99 ACUTE PULMONARY EMBOLISM WITHOUT ACUTE COR PULMONALE, UNSPECIFIED PULMONARY EMBOLISM TYPE (H): ICD-10-CM

## 2019-10-02 LAB — INR POINT OF CARE: 1.8 (ref 0.86–1.14)

## 2019-10-02 PROCEDURE — 85610 PROTHROMBIN TIME: CPT | Mod: QW

## 2019-10-02 PROCEDURE — 99207 ZZC NO CHARGE NURSE ONLY: CPT

## 2019-10-02 PROCEDURE — 36416 COLLJ CAPILLARY BLOOD SPEC: CPT

## 2019-10-02 NOTE — PROGRESS NOTES
Addendum:  Patient was instructed to continue on Lovenox injections.    Jordan Norman RN, BSN, PHN  Anticoagulation Clinic   [phone]          ANTICOAGULATION FOLLOW-UP CLINIC VISIT    Patient Name:  Yusef Alvares  Date:  10/2/2019  Contact Type:  Face to Face    SUBJECTIVE:  Patient Findings     Positives:   Missed doses    Comments:   No changes in medications, activity, health, or diet noted. No bleeding or increased bruising noted.   Patient was instructed to take 7.5 mg M/Tues but only took 5 mg as he was unsure.  Writer instructed patient to take 7.5 mg today and tomorrow.   Recheck in 2 days.   Patient verbalizes understanding and agrees to plan. No further questions or concerns.          Clinical Outcomes     Comments:   No changes in medications, activity, health, or diet noted. No bleeding or increased bruising noted.   Patient was instructed to take 7.5 mg M/Tues but only took 5 mg as he was unsure.  Writer instructed patient to take 7.5 mg today and tomorrow.   Recheck in 2 days.   Patient verbalizes understanding and agrees to plan. No further questions or concerns.             OBJECTIVE    INR Protime   Date Value Ref Range Status   10/02/2019 1.8 (A) 0.86 - 1.14 Final       ASSESSMENT / PLAN  INR assessment SUB    Recheck INR In: 2 DAYS    INR Location Clinic      Anticoagulation Summary  As of 10/2/2019    INR goal:   2.0-3.0   TTR:   --   INR used for dosin.8! (10/2/2019)   Warfarin maintenance plan:   No maintenance plan   Full warfarin instructions:   10/2: 7.5 mg; 10/3: 7.5 mg; Otherwise No maintenance plan   Next INR check:   10/4/2019   Priority:   INR   Target end date:   3/27/2020    Indications    Pulmonary embolism (H) [I26.99]  Long term current use of anticoagulant therapy [Z79.01]             Anticoagulation Episode Summary     INR check location:       Preferred lab:       Send INR reminders to:   [facility]    Comments:   * 6 month therapy      Anticoagulation Care  Providers     Provider Role Specialty Phone number    Yusef Katz MD [facility] [phone]            See the Encounter Report to view Anticoagulation Flowsheet and Dosing Calendar (Go to Encounters tab in chart review, and find the Anticoagulation Therapy Visit)        Jordan Norman RN

## 2019-10-04 ENCOUNTER — ANTICOAGULATION THERAPY VISIT (OUTPATIENT)
Dept: ANTICOAGULATION | Facility: CLINIC | Age: 44
End: 2019-10-04
Payer: COMMERCIAL

## 2019-10-04 DIAGNOSIS — Z79.01 LONG TERM CURRENT USE OF ANTICOAGULANT THERAPY: ICD-10-CM

## 2019-10-04 DIAGNOSIS — I26.99 PULMONARY EMBOLISM (H): ICD-10-CM

## 2019-10-04 DIAGNOSIS — I26.99 ACUTE PULMONARY EMBOLISM WITHOUT ACUTE COR PULMONALE, UNSPECIFIED PULMONARY EMBOLISM TYPE (H): ICD-10-CM

## 2019-10-04 LAB — INR POINT OF CARE: 2 (ref 0.86–1.14)

## 2019-10-04 PROCEDURE — 36416 COLLJ CAPILLARY BLOOD SPEC: CPT

## 2019-10-04 PROCEDURE — 99207 ZZC NO CHARGE NURSE ONLY: CPT

## 2019-10-04 PROCEDURE — 85610 PROTHROMBIN TIME: CPT | Mod: QW

## 2019-10-04 NOTE — PROGRESS NOTES
"ANTICOAGULATION FOLLOW-UP CLINIC VISIT    Patient Name:  Yusef Alvares  Date:  10/4/2019  Contact Type:  Face to Face    SUBJECTIVE:  Patient Findings     Positives:   Missed doses    Comments:   Patient states he ran out of lovenox after yesterday morning's dose. His INR is not 2.3 or greater so he needs to resume lovenox until next INR check on 10-7-19. Additional syringes were ordered and sent to pharmacy. Patient will pick them up after [facility] visit today. Patient only took 7.5 mg yesterday and not on 10. He states he wanted to \"make sure things were Kosher\" and didn't feel comfortable taking 7.5 mg two days in a row. Writer educated the patient on the risks of self dosing and that his clot risk is increased the longer he stays below goal range. Patient states he will follow [facility] instructions now. He will recheck INR in 3 days.         Clinical Outcomes     Comments:   Patient states he ran out of lovenox after yesterday morning's dose. His INR is not 2.3 or greater so he needs to resume lovenox until next INR check on 10-7-19. Additional syringes were ordered and sent to pharmacy. Patient will pick them up after ACC visit today. Patient only took 7.5 mg yesterday and not on 10-2. He states he wanted to \"make sure things were Kosher\" and didn't feel comfortable taking 7.5 mg two days in a row. Writer educated the patient on the risks of self dosing and that his clot risk is increased the longer he stays below goal range. Patient states he will follow [facility] instructions now. He will recheck INR in 3 days.            OBJECTIVE    INR Protime   Date Value Ref Range Status   10/04/2019 2.0 (A) 0.86 - 1.14 Final       ASSESSMENT / PLAN  INR assessment THER    Recheck INR In: 3 DAYS    INR Location Clinic      Anticoagulation Summary  As of 10/4/2019    INR goal:   2.0-3.0   TTR:   --   INR used for dosin.0 (10/4/2019)   Warfarin maintenance plan:   No maintenance plan   Full warfarin instructions:   10/4: 7.5 mg; " 10/5: 5 mg; 10/6: 5 mg; Otherwise No maintenance plan   Next INR check:   10/7/2019   Priority:   INR   Target end date:   3/27/2020    Indications    Pulmonary embolism (H) [I26.99]  Long term current use of anticoagulant therapy [Z79.01]             Anticoagulation Episode Summary     INR check location:       Preferred lab:       Send INR reminders to:   [facility]    Comments:   * 6 month therapy      Anticoagulation Care Providers     Provider Role Specialty Phone number    Yusef Katz MD [facility] [phone]            See the Encounter Report to view Anticoagulation Flowsheet and Dosing Calendar (Go to Encounters tab in chart review, and find the Anticoagulation Therapy Visit)        Suzie Fish RN

## 2019-10-04 NOTE — PATIENT INSTRUCTIONS
Take 7.5 mg of warfarin today. Take 5 mg Saturday and Sunday. Resume lovenox injections every 12 hours until we tell you okay to stop. These were sent to [facility] pharmacy for you today.

## 2019-10-07 ENCOUNTER — ANTICOAGULATION THERAPY VISIT (OUTPATIENT)
Dept: ANTICOAGULATION | Facility: CLINIC | Age: 44
End: 2019-10-07
Payer: COMMERCIAL

## 2019-10-07 DIAGNOSIS — I26.99 PULMONARY EMBOLISM (H): ICD-10-CM

## 2019-10-07 DIAGNOSIS — Z79.01 LONG TERM CURRENT USE OF ANTICOAGULANT THERAPY: ICD-10-CM

## 2019-10-07 LAB — INR POINT OF CARE: 2.1 (ref 0.86–1.14)

## 2019-10-07 PROCEDURE — 85610 PROTHROMBIN TIME: CPT | Mod: QW

## 2019-10-07 PROCEDURE — 99207 ZZC NO CHARGE NURSE ONLY: CPT

## 2019-10-07 PROCEDURE — 36416 COLLJ CAPILLARY BLOOD SPEC: CPT

## 2019-10-07 NOTE — PROGRESS NOTES
ANTICOAGULATION FOLLOW-UP CLINIC VISIT    Patient Name:  Yusef Alvares  Date:  10/7/2019  Contact Type:  Face to Face    SUBJECTIVE:  Patient Findings     Positives:   Change in medications (bridging with Lovenox)    Comments:   Patient reports he took warfarin as directed by [facility], no missed or extra doses. Patient has been bridging with Lovenox. Since he has had 2 INRs within range, he is okay to stop the Lovenox injections. Patient had 40 mg in the last 7 days, will increase dose to 42.5 mg by next INR on Thursday. Patient reports some bruising to his abdomen from the Lovenox injections.         Clinical Outcomes     Comments:   Patient reports he took warfarin as directed by [facility], no missed or extra doses. Patient has been bridging with Lovenox. Since he has had 2 INRs within range, he is okay to stop the Lovenox injections. Patient had 40 mg in the last 7 days, will increase dose to 42.5 mg by next INR on Thursday. Patient reports some bruising to his abdomen from the Lovenox injections.            OBJECTIVE    INR Protime   Date Value Ref Range Status   10/07/2019 2.1 (A) 0.86 - 1.14 Final       ASSESSMENT / PLAN  INR assessment THER    Recheck INR In: 3 DAYS    INR Location Clinic      Anticoagulation Summary  As of 10/7/2019    INR goal:   2.0-3.0   TTR:   100.0 % (1 d)   INR used for dosin.1 (10/7/2019)   Warfarin maintenance plan:   No maintenance plan   Full warfarin instructions:   10/7: 7.5 mg; 10/8: 5 mg; 10/9: 5 mg; Otherwise No maintenance plan   Next INR check:   10/10/2019   Priority:   INR   Target end date:   3/27/2020    Indications    Pulmonary embolism (H) [I26.99]  Long term current use of anticoagulant therapy [Z79.01]             Anticoagulation Episode Summary     INR check location:       Preferred lab:       Send INR reminders to:   TOMÁS WARE    Comments:   * 6 month therapy      Anticoagulation Care Providers     Provider Role Specialty Phone number    Yusef Katz MD  [facility] [phone]            See the Encounter Report to view Anticoagulation Flowsheet and Dosing Calendar (Go to Encounters tab in chart review, and find the Anticoagulation Therapy Visit)      Sonja Stone RN

## 2019-10-10 ENCOUNTER — ANTICOAGULATION THERAPY VISIT (OUTPATIENT)
Dept: ANTICOAGULATION | Facility: CLINIC | Age: 44
End: 2019-10-10
Payer: COMMERCIAL

## 2019-10-10 DIAGNOSIS — I26.99 ACUTE PULMONARY EMBOLISM WITHOUT ACUTE COR PULMONALE, UNSPECIFIED PULMONARY EMBOLISM TYPE (H): ICD-10-CM

## 2019-10-10 DIAGNOSIS — Z79.01 LONG TERM CURRENT USE OF ANTICOAGULANT THERAPY: ICD-10-CM

## 2019-10-10 DIAGNOSIS — I26.99 PULMONARY EMBOLISM (H): ICD-10-CM

## 2019-10-10 LAB — INR POINT OF CARE: 2.3 (ref 0.86–1.14)

## 2019-10-10 PROCEDURE — 85610 PROTHROMBIN TIME: CPT | Mod: QW

## 2019-10-10 PROCEDURE — 99207 ZZC NO CHARGE NURSE ONLY: CPT

## 2019-10-10 PROCEDURE — 36416 COLLJ CAPILLARY BLOOD SPEC: CPT

## 2019-10-10 RX ORDER — WARFARIN SODIUM 5 MG/1
TABLET ORAL
Qty: 30 TABLET | Refills: 1 | COMMUNITY
Start: 2019-10-10 | End: 2019-10-18

## 2019-10-10 NOTE — PROGRESS NOTES
ANTICOAGULATION FOLLOW-UP CLINIC VISIT    Patient Name:  Yusef Alvares  Date:  10/10/2019  Contact Type:  Face to Face    SUBJECTIVE:  Patient Findings     Comments:   No changes in medications, activity, or diet noted. No concerns with clotting, bleeding, or increased bruising noted. Took warfarin as prescribed.  Patient had 42.5 mg in the last 7 days, will adjust dose to 42.5 mg by next INR check on Monday  Patient verbalizes understanding and agrees to plan. No further questions or concerns.        Clinical Outcomes     Negatives:   Major bleeding event, Thromboembolic event, Anticoagulation-related hospital admission, Anticoagulation-related ED visit, Anticoagulation-related fatality    Comments:   No changes in medications, activity, or diet noted. No concerns with clotting, bleeding, or increased bruising noted. Took warfarin as prescribed.  Patient had 42.5 mg in the last 7 days, will adjust dose to 42.5 mg by next INR check on Monday  Patient verbalizes understanding and agrees to plan. No further questions or concerns.           OBJECTIVE    INR Protime   Date Value Ref Range Status   10/10/2019 2.3 (A) 0.86 - 1.14 Final       ASSESSMENT / PLAN  INR assessment THER    Recheck INR In: 4 DAYS    INR Location Clinic      Anticoagulation Summary  As of 10/10/2019    INR goal:   2.0-3.0   TTR:   100.0 % (4 d)   INR used for dosin.3 (10/10/2019)   Warfarin maintenance plan:   7.5 mg (5 mg x 1.5) every Mon, Thu, Sat; 5 mg (5 mg x 1) all other days   Full warfarin instructions:   7.5 mg every Mon, Thu, Sat; 5 mg all other days   Weekly warfarin total:   42.5 mg   Plan last modified:   Thalia Sood RN (10/10/2019)   Next INR check:   10/14/2019   Priority:   INR   Target end date:   3/27/2020    Indications    Pulmonary embolism (H) [I26.99]  Long term current use of anticoagulant therapy [Z79.01]             Anticoagulation Episode Summary     INR check location:       Preferred lab:       Send INR reminders  to:   TOMÁS WARE    Comments:   * 6 month therapy      Anticoagulation Care Providers     Provider Role Specialty Phone number    Yusef Katz MD [facility] [phone]            See the Encounter Report to view Anticoagulation Flowsheet and Dosing Calendar (Go to Encounters tab in chart review, and find the Anticoagulation Therapy Visit)        Thalia Sood RN

## 2019-10-14 ENCOUNTER — ANTICOAGULATION THERAPY VISIT (OUTPATIENT)
Dept: ANTICOAGULATION | Facility: CLINIC | Age: 44
End: 2019-10-14
Payer: COMMERCIAL

## 2019-10-14 DIAGNOSIS — Z79.01 LONG TERM CURRENT USE OF ANTICOAGULANT THERAPY: ICD-10-CM

## 2019-10-14 DIAGNOSIS — I26.99 PULMONARY EMBOLISM (H): ICD-10-CM

## 2019-10-14 DIAGNOSIS — L30.9 DERMATITIS: ICD-10-CM

## 2019-10-14 LAB — INR POINT OF CARE: 2.2 (ref 0.86–1.14)

## 2019-10-14 PROCEDURE — 85610 PROTHROMBIN TIME: CPT | Mod: QW

## 2019-10-14 PROCEDURE — 99207 ZZC NO CHARGE NURSE ONLY: CPT

## 2019-10-14 PROCEDURE — 36416 COLLJ CAPILLARY BLOOD SPEC: CPT

## 2019-10-14 NOTE — PROGRESS NOTES
ANTICOAGULATION FOLLOW-UP CLINIC VISIT    Patient Name:  Yusef Alvares  Date:  10/14/2019  Contact Type:  Face to Face    SUBJECTIVE:  Patient Findings     Comments:   Patient reports no changes in medication, activity, or diet. Patient reports no changes in health. Patient reports has taken warfarin as instructed. Patient reports no increased bruising or bleeding and no signs or symptoms of a blood clot.   Will plan to continue maintenance dose and recheck INR in 1 week. Patient to call [facility] with any changes or concerns. Patient verbalized understanding of all instructions, denies questions or concerns at this time.               Clinical Outcomes     Negatives:   Major bleeding event, Thromboembolic event, Anticoagulation-related hospital admission, Anticoagulation-related ED visit, Anticoagulation-related fatality    Comments:   Patient reports no changes in medication, activity, or diet. Patient reports no changes in health. Patient reports has taken warfarin as instructed. Patient reports no increased bruising or bleeding and no signs or symptoms of a blood clot.   Will plan to continue maintenance dose and recheck INR in 1 week. Patient to call [facility] with any changes or concerns. Patient verbalized understanding of all instructions, denies questions or concerns at this time.                  OBJECTIVE    INR Protime   Date Value Ref Range Status   10/14/2019 2.2 (A) 0.86 - 1.14 Final       ASSESSMENT / PLAN  INR assessment THER    Recheck INR In: 1 WEEK    INR Location Clinic      Anticoagulation Summary  As of 10/14/2019    INR goal:   2.0-3.0   TTR:   100.0 % (1.1 wk)   INR used for dosin.2 (10/14/2019)   Warfarin maintenance plan:   7.5 mg (5 mg x 1.5) every Mon, Thu, Sat; 5 mg (5 mg x 1) all other days   Full warfarin instructions:   7.5 mg every Mon, Thu, Sat; 5 mg all other days   Weekly warfarin total:   42.5 mg   No change documented:   Sonja Stone RN   Plan last modified:   Thalia Sood, RN  (10/10/2019)   Next INR check:   10/21/2019   Priority:   INR   Target end date:   3/27/2020    Indications    Pulmonary embolism (H) [I26.99]  Long term current use of anticoagulant therapy [Z79.01]             Anticoagulation Episode Summary     INR check location:       Preferred lab:       Send INR reminders to:   TOMÁS WARE    Comments:   * 6 month therapy      Anticoagulation Care Providers     Provider Role Specialty Phone number    Yusef Katz MD [facility] [phone]            See the Encounter Report to view Anticoagulation Flowsheet and Dosing Calendar (Go to Encounters tab in chart review, and find the Anticoagulation Therapy Visit)      Sonja Stone RN

## 2019-10-14 NOTE — TELEPHONE ENCOUNTER
"Requested Prescriptions   Pending Prescriptions Disp Refills     triamcinolone (KENALOG) 0.1 % external cream [Pharmacy Med Name: TRIAMCINOLONE 0.1% CRE] 45 g 1     Sig: APPLY TOPICALLY TWICE A DAY AS NEEDED ON HANDS & RIGHT LEG   Last Written Prescription Date:  8/19/19  Last Fill Quantity: 45g,  # refills: 1   Last office visit: 9/30/2019 with prescribing provider:  Yusef Katz     Future Office Visit:        Topical Steroids and Nonsteroidals Protocol Passed - 10/14/2019  7:32 AM        Passed - Patient is age 6 or older        Passed - Authorizing prescriber's most recent note related to this medication read.     If refill request is for ophthalmic use, please forward request to provider for approval.          Passed - High potency steroid not ordered        Passed - Recent (12 mo) or future (30 days) visit within the authorizing provider's specialty     Patient has had an office visit with the authorizing provider or a provider within the authorizing providers department within the previous 12 mos or has a future within next 30 days. See \"Patient Info\" tab in inbasket, or \"Choose Columns\" in Meds & Orders section of the refill encounter.              Passed - Medication is active on med list          "

## 2019-10-16 RX ORDER — TRIAMCINOLONE ACETONIDE 1 MG/G
CREAM TOPICAL
Qty: 45 G | Refills: 1 | Status: SHIPPED | OUTPATIENT
Start: 2019-10-16 | End: 2019-12-12

## 2019-10-16 NOTE — TELEPHONE ENCOUNTER
Prescription approved per [facility] Refill Protocol.  45g is a 30 day supply.  Ainsley ART RN

## 2019-10-18 DIAGNOSIS — I26.99 ACUTE PULMONARY EMBOLISM WITHOUT ACUTE COR PULMONALE, UNSPECIFIED PULMONARY EMBOLISM TYPE (H): ICD-10-CM

## 2019-10-18 DIAGNOSIS — I26.99 PULMONARY EMBOLISM (H): ICD-10-CM

## 2019-10-18 DIAGNOSIS — Z79.01 LONG TERM CURRENT USE OF ANTICOAGULANT THERAPY: ICD-10-CM

## 2019-10-18 RX ORDER — WARFARIN SODIUM 5 MG/1
TABLET ORAL
Qty: 60 TABLET | Refills: 0 | Status: SHIPPED | OUTPATIENT
Start: 2019-10-18 | End: 2019-11-14

## 2019-10-21 ENCOUNTER — ANTICOAGULATION THERAPY VISIT (OUTPATIENT)
Dept: ANTICOAGULATION | Facility: CLINIC | Age: 44
End: 2019-10-21
Payer: COMMERCIAL

## 2019-10-21 DIAGNOSIS — Z79.01 LONG TERM CURRENT USE OF ANTICOAGULANT THERAPY: ICD-10-CM

## 2019-10-21 DIAGNOSIS — I26.99 PULMONARY EMBOLISM (H): ICD-10-CM

## 2019-10-21 LAB — INR POINT OF CARE: 2.3 (ref 0.86–1.14)

## 2019-10-21 PROCEDURE — 99207 ZZC NO CHARGE NURSE ONLY: CPT

## 2019-10-21 PROCEDURE — 85610 PROTHROMBIN TIME: CPT | Mod: QW

## 2019-10-21 PROCEDURE — 36416 COLLJ CAPILLARY BLOOD SPEC: CPT

## 2019-10-21 NOTE — PROGRESS NOTES
ANTICOAGULATION FOLLOW-UP CLINIC VISIT    Patient Name:  Yusef Alvares  Date:  10/21/2019  Contact Type:  Face to Face    SUBJECTIVE:  Patient Findings     Comments:   No acute changes in kiel, medications, diet (vitamin K intake), or activity noted. Took warfarin as instructed, denies any missed doses. No issues with bleeding or unusual bruising noted.    If the INR is therapeutic at his next visit, can consider extending INR checks to every 2 weeks.        Clinical Outcomes     Negatives:   Major bleeding event, Thromboembolic event, Anticoagulation-related hospital admission, Anticoagulation-related ED visit, Anticoagulation-related fatality    Comments:   No acute changes in kiel, medications, diet (vitamin K intake), or activity noted. Took warfarin as instructed, denies any missed doses. No issues with bleeding or unusual bruising noted.    If the INR is therapeutic at his next visit, can consider extending INR checks to every 2 weeks.           OBJECTIVE    INR Protime   Date Value Ref Range Status   10/21/2019 2.3 (A) 0.86 - 1.14 Final       ASSESSMENT / PLAN  No question data found.  Anticoagulation Summary  As of 10/21/2019    INR goal:   2.0-3.0   TTR:   100.0 % (2.1 wk)   INR used for dosin.3 (10/21/2019)   Warfarin maintenance plan:   7.5 mg (5 mg x 1.5) every Mon, Thu, Sat; 5 mg (5 mg x 1) all other days   Full warfarin instructions:   7.5 mg every Mon, Thu, Sat; 5 mg all other days   Weekly warfarin total:   42.5 mg   No change documented:   Marcelina Moore RN   Plan last modified:   Thalia Sood RN (10/10/2019)   Next INR check:   10/28/2019   Priority:   INR   Target end date:   3/27/2020    Indications    Pulmonary embolism (H) [I26.99]  Long term current use of anticoagulant therapy [Z79.01]             Anticoagulation Episode Summary     INR check location:       Preferred lab:       Send INR reminders to:   TOMÁS WARE    Comments:   * 6 month therapy      Anticoagulation  Care Providers     Provider Role Specialty Phone number    Yusef Katz MD [facility] [phone]            See the Encounter Report to view Anticoagulation Flowsheet and Dosing Calendar (Go to Encounters tab in chart review, and find the Anticoagulation Therapy Visit)        Marcelina Moore RN [facility]

## 2019-10-28 ENCOUNTER — ANTICOAGULATION THERAPY VISIT (OUTPATIENT)
Dept: ANTICOAGULATION | Facility: CLINIC | Age: 44
End: 2019-10-28
Payer: COMMERCIAL

## 2019-10-28 DIAGNOSIS — I26.99 PULMONARY EMBOLISM (H): ICD-10-CM

## 2019-10-28 DIAGNOSIS — Z79.01 LONG TERM CURRENT USE OF ANTICOAGULANT THERAPY: ICD-10-CM

## 2019-10-28 LAB — INR POINT OF CARE: 2.2 (ref 0.86–1.14)

## 2019-10-28 PROCEDURE — 99207 ZZC NO CHARGE NURSE ONLY: CPT

## 2019-10-28 PROCEDURE — 36416 COLLJ CAPILLARY BLOOD SPEC: CPT

## 2019-10-28 PROCEDURE — 85610 PROTHROMBIN TIME: CPT | Mod: QW

## 2019-10-28 NOTE — PROGRESS NOTES
ANTICOAGULATION FOLLOW-UP CLINIC VISIT    Patient Name:  Yusef Alvares  Date:  10/28/2019  Contact Type:  Face to Face    SUBJECTIVE:  Patient Findings     Comments:   Patient accidentally dropped the 1/2 dose of his warfarin last Monday on the floor and couldn't find it. He did not grab an extra half to make up for it. Since that change was 7 days ago, will continue on the same warfarin dose and recheck in 2 weeks.     No acute changes in kiel, medications, diet (vitamin K intake), or activity noted. No issues with bleeding or unusual bruising noted.         Clinical Outcomes     Negatives:   Major bleeding event, Thromboembolic event, Anticoagulation-related hospital admission, Anticoagulation-related ED visit, Anticoagulation-related fatality    Comments:   Patient accidentally dropped the 1/2 dose of his warfarin last Monday on the floor and couldn't find it. He did not grab an extra half to make up for it. Since that change was 7 days ago, will continue on the same warfarin dose and recheck in 2 weeks.     No acute changes in kiel, medications, diet (vitamin K intake), or activity noted. No issues with bleeding or unusual bruising noted.            OBJECTIVE    INR Protime   Date Value Ref Range Status   10/28/2019 2.2 (A) 0.86 - 1.14 Final       ASSESSMENT / PLAN  INR assessment THER    Recheck INR In: 2 WEEKS    INR Location Clinic      Anticoagulation Summary  As of 10/28/2019    INR goal:   2.0-3.0   TTR:   100.0 % (3.1 wk)   INR used for dosin.2 (10/28/2019)   Warfarin maintenance plan:   7.5 mg (5 mg x 1.5) every Mon, Thu, Sat; 5 mg (5 mg x 1) all other days   Full warfarin instructions:   7.5 mg every Mon, Thu, Sat; 5 mg all other days   Weekly warfarin total:   42.5 mg   No change documented:   Marcelina Moore RN   Plan last modified:   Thalia Sood RN (10/10/2019)   Next INR check:   2019   Priority:   INR   Target end date:   3/27/2020    Indications    Pulmonary embolism (H)  [I26.99]  Long term current use of anticoagulant therapy [Z79.01]             Anticoagulation Episode Summary     INR check location:       Preferred lab:       Send INR reminders to:   TOMÁS WARE    Comments:   * 6 month therapy      Anticoagulation Care Providers     Provider Role Specialty Phone number    Yusef Katz MD [facility] [phone]            See the Encounter Report to view Anticoagulation Flowsheet and Dosing Calendar (Go to Encounters tab in chart review, and find the Anticoagulation Therapy Visit)        [facility] RN [facility]

## 2019-11-14 DIAGNOSIS — I26.99 PULMONARY EMBOLISM (H): ICD-10-CM

## 2019-11-14 DIAGNOSIS — I26.99 ACUTE PULMONARY EMBOLISM WITHOUT ACUTE COR PULMONALE, UNSPECIFIED PULMONARY EMBOLISM TYPE (H): ICD-10-CM

## 2019-11-14 DIAGNOSIS — Z79.01 LONG TERM CURRENT USE OF ANTICOAGULANT THERAPY: ICD-10-CM

## 2019-11-14 RX ORDER — WARFARIN SODIUM 5 MG/1
TABLET ORAL
Qty: 105 TABLET | Refills: 0 | Status: SHIPPED | OUTPATIENT
Start: 2019-11-14 | End: 2019-12-23

## 2019-11-14 NOTE — TELEPHONE ENCOUNTER
"Requested Prescriptions   Pending Prescriptions Disp Refills     warfarin ANTICOAGULANT (COUMADIN) 5 MG tablet [Pharmacy Med Name: WARFARIN 5MG TAB] 36 tablet      Sig: TAKE 1 & 1/2 TABLETS (7.5MG) BY MOUTH EVERY MONDAY, THU, SAT; TAKE 1 TABLET (5MG) ALL OTHER DAYS OR AS DIRECTED BY       Vitamin K Antagonists Failed - 11/14/2019 10:56 AM        Failed - INR is within goal in the past 6 weeks     Confirm INR is within goal in the past 6 weeks.     Recent Labs   Lab Test 10/28/19   INR 2.2*                       Passed - Recent (12 mo) or future (30 days) visit within the authorizing provider's specialty     Patient has had an office visit with the authorizing provider or a provider within the authorizing providers department within the previous 12 mos or has a future within next 30 days. See \"Patient Info\" tab in inbasket, or \"Choose Columns\" in Meds & Orders section of the refill encounter.              Passed - Medication is active on med list        Passed - Patient is 18 years of age or older        Last Written Prescription Date:  10/18/19  Last Fill Quantity: 60,  # refills: 0   Last office visit: 10/28/2019 with prescribing provider:  Sterling   Future Office Visit:      "

## 2019-11-14 NOTE — TELEPHONE ENCOUNTER
Current warfarin dose:  Warfarin maintenance plan:   7.5 mg (5 mg x 1.5) every Mon, Thu, Sat; 5 mg (5 mg x 1) all other days   Full warfarin instructions:   7.5 mg every Mon, Thu, Sat; 5 mg all other days   Weekly warfarin total:   42.5 mg   Next INR check:   11/11/2019     Patient has an appointment for an INR check tomorrow, 11/15/19.    Last INR result:    INR Protime   Date Value Ref Range Status   10/28/2019 2.2 (A) 0.86 - 1.14 Final       Last office visit: 9/30/19    Refill authorized per [facility] protocol.    Salvador PEREZ RN, CACP

## 2019-11-15 ENCOUNTER — ANTICOAGULATION THERAPY VISIT (OUTPATIENT)
Dept: ANTICOAGULATION | Facility: CLINIC | Age: 44
End: 2019-11-15
Payer: COMMERCIAL

## 2019-11-15 DIAGNOSIS — Z79.01 LONG TERM CURRENT USE OF ANTICOAGULANT THERAPY: ICD-10-CM

## 2019-11-15 DIAGNOSIS — I26.99 PULMONARY EMBOLISM (H): ICD-10-CM

## 2019-11-15 LAB — INR POINT OF CARE: 2.1 (ref 0.86–1.14)

## 2019-11-15 PROCEDURE — 36416 COLLJ CAPILLARY BLOOD SPEC: CPT

## 2019-11-15 PROCEDURE — 99207 ZZC NO CHARGE NURSE ONLY: CPT

## 2019-11-15 PROCEDURE — 85610 PROTHROMBIN TIME: CPT | Mod: QW

## 2019-11-15 NOTE — PROGRESS NOTES
ANTICOAGULATION FOLLOW-UP CLINIC VISIT    Patient Name:  Yusef Alvares  Date:  11/15/2019  Contact Type:  Face to Face    SUBJECTIVE:  Patient Findings     Comments:   No changes in medications, activity, health, or diet noted. No bleeding or increased bruising noted. Took warfarin as prescribed.  Patient will continue weekly maintenance dose. INR is therapeutic.   Recheck in 10 days  Patient verbalizes understanding and agrees to plan. No further questions or concerns.          Clinical Outcomes     Negatives:   Major bleeding event, Thromboembolic event, Anticoagulation-related hospital admission, Anticoagulation-related ED visit, Anticoagulation-related fatality    Comments:   No changes in medications, activity, health, or diet noted. No bleeding or increased bruising noted. Took warfarin as prescribed.  Patient will continue weekly maintenance dose. INR is therapeutic.   Recheck in 10 days  Patient verbalizes understanding and agrees to plan. No further questions or concerns.             OBJECTIVE    INR Protime   Date Value Ref Range Status   11/15/2019 2.1 (A) 0.86 - 1.14 Final       ASSESSMENT / PLAN  INR assessment THER    Recheck INR In: 10 DAYS    INR Location Clinic      Anticoagulation Summary  As of 11/15/2019    INR goal:   2.0-3.0   TTR:   100.0 % (1.3 mo)   INR used for dosin.1 (11/15/2019)   Warfarin maintenance plan:   7.5 mg (5 mg x 1.5) every Mon, Thu, Sat; 5 mg (5 mg x 1) all other days   Full warfarin instructions:   7.5 mg every Mon, Thu, Sat; 5 mg all other days   Weekly warfarin total:   42.5 mg   No change documented:   Jordan Norman RN   Plan last modified:   Thalia Sood RN (10/10/2019)   Next INR check:   2019   Priority:   INR   Target end date:   3/27/2020    Indications    Pulmonary embolism (H) [I26.99]  Long term current use of anticoagulant therapy [Z79.01]             Anticoagulation Episode Summary     INR check location:       Preferred lab:       Send INR  reminders to:   TOMÁS WARE    Comments:   * 6 month therapy      Anticoagulation Care Providers     Provider Role Specialty Phone number    Yusef Katz MD [facility] [phone]            See the Encounter Report to view Anticoagulation Flowsheet and Dosing Calendar (Go to Encounters tab in chart review, and find the Anticoagulation Therapy Visit)        Jordan Norman RN

## 2019-11-17 ENCOUNTER — TELEPHONE (OUTPATIENT)
Dept: FAMILY MEDICINE | Facility: CLINIC | Age: 44
End: 2019-11-17

## 2019-11-17 NOTE — TELEPHONE ENCOUNTER
[facility] ICD10 request form received, completed, and routed to DR. Katz for review and signature.

## 2019-11-25 ENCOUNTER — ANTICOAGULATION THERAPY VISIT (OUTPATIENT)
Dept: ANTICOAGULATION | Facility: CLINIC | Age: 44
End: 2019-11-25
Payer: COMMERCIAL

## 2019-11-25 DIAGNOSIS — Z79.01 LONG TERM CURRENT USE OF ANTICOAGULANT THERAPY: ICD-10-CM

## 2019-11-25 DIAGNOSIS — I26.99 PULMONARY EMBOLISM (H): ICD-10-CM

## 2019-11-25 LAB — INR POINT OF CARE: 2.2 (ref 0.86–1.14)

## 2019-11-25 PROCEDURE — 85610 PROTHROMBIN TIME: CPT | Mod: QW

## 2019-11-25 PROCEDURE — 99207 ZZC NO CHARGE NURSE ONLY: CPT

## 2019-11-25 PROCEDURE — 36416 COLLJ CAPILLARY BLOOD SPEC: CPT

## 2019-11-25 NOTE — PROGRESS NOTES
ANTICOAGULATION FOLLOW-UP CLINIC VISIT    Patient Name:  Yusef Alvares  Date:  2019  Contact Type:  Face to Face    SUBJECTIVE:  Patient Findings     Comments:   Patient reports no changes in medication, activity, or diet. Patient reports no changes in health. Patient reports has taken warfarin as instructed. Patient reports no increased bruising or bleeding and no signs or symptoms of a blood clot.   Will plan to continue maintenance dose and recheck INR in 2 weeks. Patient to call [facility] with any changes or concerns. Patient verbalized understanding of all instructions, denies questions or concerns at this time.             Clinical Outcomes     Negatives:   Major bleeding event, Thromboembolic event, Anticoagulation-related hospital admission, Anticoagulation-related ED visit, Anticoagulation-related fatality    Comments:   Patient reports no changes in medication, activity, or diet. Patient reports no changes in health. Patient reports has taken warfarin as instructed. Patient reports no increased bruising or bleeding and no signs or symptoms of a blood clot.   Will plan to continue maintenance dose and recheck INR in 2 weeks. Patient to call [facility] with any changes or concerns. Patient verbalized understanding of all instructions, denies questions or concerns at this time.                OBJECTIVE    INR Protime   Date Value Ref Range Status   2019 2.2 (A) 0.86 - 1.14 Final       ASSESSMENT / PLAN  INR assessment THER    Recheck INR In: 2 WEEKS    INR Location Clinic      Anticoagulation Summary  As of 2019    INR goal:   2.0-3.0   TTR:   100.0 % (1.7 mo)   INR used for dosin.2 (2019)   Warfarin maintenance plan:   7.5 mg (5 mg x 1.5) every Mon, Thu, Sat; 5 mg (5 mg x 1) all other days   Full warfarin instructions:   7.5 mg every Mon, Thu, Sat; 5 mg all other days   Weekly warfarin total:   42.5 mg   No change documented:   Sonja Stone RN   Plan last modified:   Thalia Sood, RN  (10/10/2019)   Next INR check:   12/9/2019   Priority:   Maintenance   Target end date:   3/27/2020    Indications    Pulmonary embolism (H) [I26.99]  Long term current use of anticoagulant therapy [Z79.01]             Anticoagulation Episode Summary     INR check location:       Preferred lab:       Send INR reminders to:   TOMÁS WARE    Comments:   * 6 month therapy      Anticoagulation Care Providers     Provider Role Specialty Phone number    Yusef Katz MD [facility] [phone]            See the Encounter Report to view Anticoagulation Flowsheet and Dosing Calendar (Go to Encounters tab in chart review, and find the Anticoagulation Therapy Visit)      Sonja Stone RN

## 2019-12-09 ENCOUNTER — ANTICOAGULATION THERAPY VISIT (OUTPATIENT)
Dept: ANTICOAGULATION | Facility: CLINIC | Age: 44
End: 2019-12-09
Payer: COMMERCIAL

## 2019-12-09 DIAGNOSIS — Z79.01 LONG TERM CURRENT USE OF ANTICOAGULANT THERAPY: ICD-10-CM

## 2019-12-09 DIAGNOSIS — I26.99 PULMONARY EMBOLISM (H): ICD-10-CM

## 2019-12-09 LAB — INR POINT OF CARE: 1.8 (ref 0.86–1.14)

## 2019-12-09 PROCEDURE — 99207 ZZC NO CHARGE NURSE ONLY: CPT

## 2019-12-09 PROCEDURE — 85610 PROTHROMBIN TIME: CPT | Mod: QW

## 2019-12-09 PROCEDURE — 36416 COLLJ CAPILLARY BLOOD SPEC: CPT

## 2019-12-09 NOTE — PROGRESS NOTES
ANTICOAGULATION FOLLOW-UP CLINIC VISIT    Patient Name:  Yusef Alvares  Date:  2019  Contact Type:  Face to Face    SUBJECTIVE:  Patient Findings     Positives:   Change in diet/appetite (cutting back on caffeine )    Comments:   Patient reports no changes in medication, activity, or diet, other than patient has been trying to cut back on caffeine. Patient reports no changes in health. Patient reports has taken warfarin as instructed. Patient reports no increased bruising or bleeding and no signs or symptoms of a blood clot. Will continue same dose, recheck INR in 2 weeks. If INR remains below goal range, may need maintenance dose adjustment.             Clinical Outcomes     Comments:   Patient reports no changes in medication, activity, or diet, other than patient has been trying to cut back on caffeine. Patient reports no changes in health. Patient reports has taken warfarin as instructed. Patient reports no increased bruising or bleeding and no signs or symptoms of a blood clot. Will continue same dose, recheck INR in 2 weeks. If INR remains below goal range, may need maintenance dose adjustment.                OBJECTIVE    INR Protime   Date Value Ref Range Status   2019 1.8 (A) 0.86 - 1.14 Final       ASSESSMENT / PLAN  INR assessment SUB    Recheck INR In: 2 WEEKS    INR Location Clinic      Anticoagulation Summary  As of 2019    INR goal:   2.0-3.0   TTR:   89.1 % (2.1 mo)   INR used for dosin.8! (2019)   Warfarin maintenance plan:   7.5 mg (5 mg x 1.5) every Mon, Thu, Sat; 5 mg (5 mg x 1) all other days   Full warfarin instructions:   7.5 mg every Mon, Thu, Sat; 5 mg all other days   Weekly warfarin total:   42.5 mg   No change documented:   Sonja Stone RN   Plan last modified:   Thalia Sood RN (10/10/2019)   Next INR check:   2019   Priority:   Maintenance   Target end date:   3/27/2020    Indications    Pulmonary embolism (H) [I26.99]  Long term current use of  anticoagulant therapy [Z79.01]             Anticoagulation Episode Summary     INR check location:       Preferred lab:       Send INR reminders to:   TOMÁS WARE    Comments:   * 6 month therapy      Anticoagulation Care Providers     Provider Role Specialty Phone number    Yusef Katz MD [facility] [phone]            See the Encounter Report to view Anticoagulation Flowsheet and Dosing Calendar (Go to Encounters tab in chart review, and find the Anticoagulation Therapy Visit)      Sonja Stone RN

## 2019-12-11 ENCOUNTER — HOSPITAL ENCOUNTER (OUTPATIENT)
Dept: CT IMAGING | Facility: CLINIC | Age: 44
Discharge: HOME | End: 2019-12-11
Attending: FAMILY MEDICINE | Admitting: FAMILY MEDICINE
Payer: COMMERCIAL

## 2019-12-11 DIAGNOSIS — I26.99 ACUTE PULMONARY EMBOLISM WITHOUT ACUTE COR PULMONALE, UNSPECIFIED PULMONARY EMBOLISM TYPE (H): ICD-10-CM

## 2019-12-11 PROCEDURE — 71275 CT ANGIOGRAPHY CHEST: CPT

## 2019-12-11 PROCEDURE — 25000125 ZZHC RX 250: Performed by: RADIOLOGY

## 2019-12-11 PROCEDURE — 25000128 H RX IP 250 OP 636: Performed by: RADIOLOGY

## 2019-12-11 RX ORDER — IOPAMIDOL 755 MG/ML
79 INJECTION, SOLUTION INTRAVASCULAR ONCE
Status: COMPLETED | OUTPATIENT
Start: 2019-12-11 | End: 2019-12-11

## 2019-12-11 RX ADMIN — SODIUM CHLORIDE 100 ML: 9 INJECTION, SOLUTION INTRAVENOUS at 10:11

## 2019-12-11 RX ADMIN — IOPAMIDOL 79 ML: 755 INJECTION, SOLUTION INTRAVENOUS at 10:11

## 2019-12-12 DIAGNOSIS — L30.9 DERMATITIS: ICD-10-CM

## 2019-12-12 NOTE — TELEPHONE ENCOUNTER
"Requested Prescriptions   Pending Prescriptions Disp Refills     triamcinolone (KENALOG) 0.1 % external cream [Pharmacy Med Name: TRIAMCINOLONE 0.1% CRE] 45 g 1     Sig: APPLY TOPICALLY TWICE A DAY AS NEEDED ON HANDS & RIGHT LEG       Topical Steroids and Nonsteroidals Protocol Passed - 12/12/2019 12:04 PM        Passed - Patient is age 6 or older        Passed - Authorizing prescriber's most recent note related to this medication read.     If refill request is for ophthalmic use, please forward request to provider for approval.          Passed - High potency steroid not ordered        Passed - Recent (12 mo) or future (30 days) visit within the authorizing provider's specialty     Patient has had an office visit with the authorizing provider or a provider within the authorizing providers department within the previous 12 mos or has a future within next 30 days. See \"Patient Info\" tab in inbasket, or \"Choose Columns\" in Meds & Orders section of the refill encounter.              Passed - Medication is active on med list        Last Written Prescription Date:  10/16/2019  Last Fill Quantity: 45g,  # refills: 1   Last office visit: 9/30/2019 with prescribing provider:  Sterling    Future Office Visit:      "

## 2019-12-13 RX ORDER — TRIAMCINOLONE ACETONIDE 1 MG/G
CREAM TOPICAL
Qty: 45 G | Refills: 3 | Status: SHIPPED | OUTPATIENT
Start: 2019-12-13 | End: 2020-03-27

## 2019-12-23 ENCOUNTER — ANTICOAGULATION THERAPY VISIT (OUTPATIENT)
Dept: ANTICOAGULATION | Facility: CLINIC | Age: 44
End: 2019-12-23
Payer: COMMERCIAL

## 2019-12-23 DIAGNOSIS — I26.99 ACUTE PULMONARY EMBOLISM WITHOUT ACUTE COR PULMONALE, UNSPECIFIED PULMONARY EMBOLISM TYPE (H): ICD-10-CM

## 2019-12-23 DIAGNOSIS — Z79.01 LONG TERM CURRENT USE OF ANTICOAGULANT THERAPY: ICD-10-CM

## 2019-12-23 DIAGNOSIS — I26.99 PULMONARY EMBOLISM (H): ICD-10-CM

## 2019-12-23 LAB — INR POINT OF CARE: 1.9 (ref 0.86–1.14)

## 2019-12-23 PROCEDURE — 99207 ZZC NO CHARGE NURSE ONLY: CPT

## 2019-12-23 PROCEDURE — 85610 PROTHROMBIN TIME: CPT | Mod: QW

## 2019-12-23 PROCEDURE — 36416 COLLJ CAPILLARY BLOOD SPEC: CPT

## 2019-12-23 RX ORDER — WARFARIN SODIUM 5 MG/1
TABLET ORAL
Qty: 110 TABLET | Refills: 0
Start: 2019-12-23 | End: 2020-01-21

## 2019-12-23 NOTE — PROGRESS NOTES
ANTICOAGULATION FOLLOW-UP CLINIC VISIT    Patient Name:  Yusef Alvares  Date:  2019  Contact Type:  Face to Face    SUBJECTIVE:  Patient Findings     Comments:   Patient reports no changes in medication, activity, or diet. Patient reports no changes in health. Patient reports on Thursday he took 5mg and then on Friday took 7.5 mg as he had run of warfarin and hadn't picked up his refill, weekly total was unchanged. Patient reports no increased bruising or bleeding and no signs or symptoms of a blood clot. Second INR out of range, will adjust dose to 45 mg weekly and recheck INR in 2 weeks. Patient did have a CT scan done-report states his PE is resolved. Patient will be making an appt to discuss with Dr. Katz.           Clinical Outcomes     Negatives:   Major bleeding event, Thromboembolic event, Anticoagulation-related hospital admission, Anticoagulation-related ED visit, Anticoagulation-related fatality    Comments:   Patient reports no changes in medication, activity, or diet. Patient reports no changes in health. Patient reports on Thursday he took 5mg and then on Friday took 7.5 mg as he had run of warfarin and hadn't picked up his refill, weekly total was unchanged. Patient reports no increased bruising or bleeding and no signs or symptoms of a blood clot. Second INR out of range, will adjust dose to 45 mg weekly and recheck INR in 2 weeks. Patient did have a CT scan done-report states his PE is resolved. Patient will be making an appt to discuss with Dr. Katz.              OBJECTIVE    INR Protime   Date Value Ref Range Status   2019 1.9 (A) 0.86 - 1.14 Final       ASSESSMENT / PLAN  INR assessment SUB    Recheck INR In: 2 WEEKS    INR Location Clinic      Anticoagulation Summary  As of 2019    INR goal:   2.0-3.0   TTR:   73.1 % (2.6 mo)   INR used for dosin.9! (2019)   Warfarin maintenance plan:   5 mg (5 mg x 1) every Sun, Tue, Thu; 7.5 mg (5 mg x 1.5) all other days    Full warfarin instructions:   5 mg every Sun, Tue, Thu; 7.5 mg all other days   Weekly warfarin total:   45 mg   Plan last modified:   Sonja Stone RN (12/23/2019)   Next INR check:   1/6/2020   Priority:   Maintenance   Target end date:   3/27/2020    Indications    Pulmonary embolism (H) [I26.99]  Long term current use of anticoagulant therapy [Z79.01]             Anticoagulation Episode Summary     INR check location:       Preferred lab:       Send INR reminders to:   TOMÁS WARE    Comments:   * 6 month therapy      Anticoagulation Care Providers     Provider Role Specialty Phone number    Yusef Katz MD [facility] [phone]            See the Encounter Report to view Anticoagulation Flowsheet and Dosing Calendar (Go to Encounters tab in chart review, and find the Anticoagulation Therapy Visit)      Sonja Stone RN

## 2020-01-06 ENCOUNTER — ANTICOAGULATION THERAPY VISIT (OUTPATIENT)
Dept: ANTICOAGULATION | Facility: CLINIC | Age: 45
End: 2020-01-06
Payer: COMMERCIAL

## 2020-01-06 DIAGNOSIS — Z79.01 LONG TERM CURRENT USE OF ANTICOAGULANT THERAPY: ICD-10-CM

## 2020-01-06 DIAGNOSIS — I26.99 PULMONARY EMBOLISM (H): ICD-10-CM

## 2020-01-06 LAB — INR POINT OF CARE: 2 (ref 0.86–1.14)

## 2020-01-06 PROCEDURE — 36416 COLLJ CAPILLARY BLOOD SPEC: CPT

## 2020-01-06 PROCEDURE — 85610 PROTHROMBIN TIME: CPT | Mod: QW

## 2020-01-06 PROCEDURE — 99207 ZZC NO CHARGE NURSE ONLY: CPT

## 2020-01-06 NOTE — PROGRESS NOTES
ANTICOAGULATION FOLLOW-UP CLINIC VISIT    Patient Name:  Yusef Alvares  Date:  2020  Contact Type:  Face to Face    SUBJECTIVE:  Patient Findings     Comments:   Patient reports no changes in medication, activity, or diet. Patient reports no changes in health. Patient reports has taken warfarin as instructed. Patient reports no increased bruising or bleeding and no signs or symptoms of a blood clot.           Clinical Outcomes     Negatives:   Major bleeding event, Thromboembolic event, Anticoagulation-related hospital admission, Anticoagulation-related ED visit, Anticoagulation-related fatality    Comments:   Patient reports no changes in medication, activity, or diet. Patient reports no changes in health. Patient reports has taken warfarin as instructed. Patient reports no increased bruising or bleeding and no signs or symptoms of a blood clot.              OBJECTIVE    INR Protime   Date Value Ref Range Status   2020 2.0 (A) 0.86 - 1.14 Final       ASSESSMENT / PLAN  INR assessment THER    Recheck INR In: 2 WEEKS    INR Location Clinic      Anticoagulation Summary  As of 2020    INR goal:   2.0-3.0   TTR:   62.0 % (3.1 mo)   INR used for dosin.0 (2020)   Warfarin maintenance plan:   5 mg (5 mg x 1) every Sun, Tue, Thu; 7.5 mg (5 mg x 1.5) all other days   Full warfarin instructions:   5 mg every Sun, Tue, Thu; 7.5 mg all other days   Weekly warfarin total:   45 mg   No change documented:   Sonja Stone RN   Plan last modified:   Sonja Stone RN (2019)   Next INR check:   2020   Priority:   Maintenance   Target end date:   3/27/2020    Indications    Pulmonary embolism (H) [I26.99]  Long term current use of anticoagulant therapy [Z79.01]             Anticoagulation Episode Summary     INR check location:       Preferred lab:       Send INR reminders to:   TOMÁS WARE    Comments:   * 6 month therapy      Anticoagulation Care Providers     Provider Role Specialty  Phone number    Yusef Katz MD [facility] [phone]            See the Encounter Report to view Anticoagulation Flowsheet and Dosing Calendar (Go to Encounters tab in chart review, and find the Anticoagulation Therapy Visit)      Sonja Stone RN

## 2020-01-21 ENCOUNTER — ANTICOAGULATION THERAPY VISIT (OUTPATIENT)
Dept: ANTICOAGULATION | Facility: CLINIC | Age: 45
End: 2020-01-21
Payer: COMMERCIAL

## 2020-01-21 DIAGNOSIS — Z79.01 LONG TERM CURRENT USE OF ANTICOAGULANT THERAPY: ICD-10-CM

## 2020-01-21 DIAGNOSIS — I26.99 ACUTE PULMONARY EMBOLISM WITHOUT ACUTE COR PULMONALE, UNSPECIFIED PULMONARY EMBOLISM TYPE (H): ICD-10-CM

## 2020-01-21 DIAGNOSIS — I26.99 PULMONARY EMBOLISM (H): ICD-10-CM

## 2020-01-21 LAB — INR POINT OF CARE: 1.8 (ref 0.86–1.14)

## 2020-01-21 PROCEDURE — 99207 ZZC NO CHARGE NURSE ONLY: CPT

## 2020-01-21 PROCEDURE — 85610 PROTHROMBIN TIME: CPT | Mod: QW

## 2020-01-21 PROCEDURE — 36416 COLLJ CAPILLARY BLOOD SPEC: CPT

## 2020-01-21 RX ORDER — WARFARIN SODIUM 5 MG/1
TABLET ORAL
Qty: 110 TABLET | Refills: 0
Start: 2020-01-21 | End: 2020-02-05

## 2020-01-21 NOTE — PROGRESS NOTES
ANTICOAGULATION FOLLOW-UP CLINIC VISIT    Patient Name:  Yusef Alvares  Date:  2020  Contact Type:  Face to Face    SUBJECTIVE:  Patient Findings     Comments:   Patient reports no changes in medication, activity, or diet. Patient reports no changes in health. Patient reports has taken warfarin as instructed. Patient reports no increased bruising or bleeding and no signs or symptoms of a blood clot. Will increase dose today and then start new maintenance dose of 47.5 mg weekly (6% increase). Patient denies signs or symptoms of bleeding. Patient denies any signs or symptoms of a blood clot. Writer educated patient regarding increased clot risk and when to seek immediate medical attention. Patient verbalized understanding.          Clinical Outcomes     Negatives:   Major bleeding event, Thromboembolic event, Anticoagulation-related hospital admission, Anticoagulation-related ED visit, Anticoagulation-related fatality    Comments:   Patient reports no changes in medication, activity, or diet. Patient reports no changes in health. Patient reports has taken warfarin as instructed. Patient reports no increased bruising or bleeding and no signs or symptoms of a blood clot. Will increase dose today and then start new maintenance dose of 47.5 mg weekly (6% increase). Patient denies signs or symptoms of bleeding. Patient denies any signs or symptoms of a blood clot. Writer educated patient regarding increased clot risk and when to seek immediate medical attention. Patient verbalized understanding.             OBJECTIVE    INR Protime   Date Value Ref Range Status   2020 1.8 (A) 0.86 - 1.14 Final       ASSESSMENT / PLAN  INR assessment SUB    Recheck INR In: 2 WEEKS    INR Location Clinic      Anticoagulation Summary  As of 2020    INR goal:   2.0-3.0   TTR:   53.3 % (3.6 mo)   INR used for dosin.8! (2020)   Warfarin maintenance plan:   5 mg (5 mg x 1) every Sun, Thu; 7.5 mg (5 mg x 1.5) all other  days   Full warfarin instructions:   1/21: 7.5 mg; 1/28: 7.5 mg; Otherwise 5 mg every Sun, Thu; 7.5 mg all other days   Weekly warfarin total:   47.5 mg   Plan last modified:   Sonja Stone RN (1/21/2020)   Next INR check:   2/4/2020   Priority:   Maintenance   Target end date:   3/27/2020    Indications    Pulmonary embolism (H) [I26.99]  Long term current use of anticoagulant therapy [Z79.01]             Anticoagulation Episode Summary     INR check location:       Preferred lab:       Send INR reminders to:   TOMÁS WARE    Comments:   * 6 month therapy      Anticoagulation Care Providers     Provider Role Specialty Phone number    Yusef Katz MD [facility] [phone]            See the Encounter Report to view Anticoagulation Flowsheet and Dosing Calendar (Go to Encounters tab in chart review, and find the Anticoagulation Therapy Visit)      Sonja Stone RN

## 2020-02-03 DIAGNOSIS — Z79.01 LONG TERM CURRENT USE OF ANTICOAGULANT THERAPY: ICD-10-CM

## 2020-02-03 DIAGNOSIS — I26.99 ACUTE PULMONARY EMBOLISM WITHOUT ACUTE COR PULMONALE, UNSPECIFIED PULMONARY EMBOLISM TYPE (H): ICD-10-CM

## 2020-02-03 DIAGNOSIS — I26.99 PULMONARY EMBOLISM (H): ICD-10-CM

## 2020-02-03 NOTE — TELEPHONE ENCOUNTER
"Requested Prescriptions   Pending Prescriptions Disp Refills     warfarin ANTICOAGULANT (COUMADIN) 5 MG tablet [Pharmacy Med Name: WARFARIN 5MG TAB]  Last Written Prescription Date:  1/21/20  Last Fill Quantity: 110,  # refills: 0   Last Office Visit with SOLA, RICHA or [facility] prescribing provider:  9/30/19   Future Office Visit:      35 tablet      Sig: TAKE 1 & 1/2 TABLETS (7.5MG) BY MOUTH EVERY MONDAY, THU, SAT; TAKE 1 TABLET (5MG) ALL OTHER DAYS OR AS DIRECTED BY       Vitamin K Antagonists Failed - 2/3/2020  4:40 PM        Failed - INR is within goal in the past 6 weeks     Confirm INR is within goal in the past 6 weeks.     Recent Labs   Lab Test 01/21/20   INR 1.8*                       Passed - Recent (12 mo) or future (30 days) visit within the authorizing provider's specialty     Patient has had an office visit with the authorizing provider or a provider within the authorizing providers department within the previous 12 mos or has a future within next 30 days. See \"Patient Info\" tab in inbasket, or \"Choose Columns\" in Meds & Orders section of the refill encounter.              Passed - Medication is active on med list        Passed - Patient is 18 years of age or older          "

## 2020-02-05 RX ORDER — WARFARIN SODIUM 5 MG/1
TABLET ORAL
Qty: 35 TABLET | Status: SHIPPED
Start: 2020-02-05 | End: 2020-02-26

## 2020-02-06 ENCOUNTER — ANTICOAGULATION THERAPY VISIT (OUTPATIENT)
Dept: ANTICOAGULATION | Facility: CLINIC | Age: 45
End: 2020-02-06
Payer: COMMERCIAL

## 2020-02-06 DIAGNOSIS — I26.99 PULMONARY EMBOLISM (H): ICD-10-CM

## 2020-02-06 DIAGNOSIS — Z79.01 LONG TERM CURRENT USE OF ANTICOAGULANT THERAPY: ICD-10-CM

## 2020-02-06 LAB — INR POINT OF CARE: 2.9 (ref 0.86–1.14)

## 2020-02-06 PROCEDURE — 99207 ZZC NO CHARGE NURSE ONLY: CPT

## 2020-02-06 PROCEDURE — 85610 PROTHROMBIN TIME: CPT | Mod: QW

## 2020-02-06 PROCEDURE — 36416 COLLJ CAPILLARY BLOOD SPEC: CPT

## 2020-02-06 NOTE — PROGRESS NOTES
ANTICOAGULATION FOLLOW-UP CLINIC VISIT    Patient Name:  Yusef Alvares  Date:  2020  Contact Type:  Face to Face    SUBJECTIVE:  Patient Findings     Comments:   No changes in medications, activity, or diet noted. No concerns with clotting, bleeding, or increased bruising noted. Took warfarin as prescribed.  Patient is to continue maintenance warfarin plan, and check INR in 2 weeks.  Patient verbalizes understanding and agrees to plan. No further questions or concerns.        Clinical Outcomes     Negatives:   Major bleeding event, Thromboembolic event, Anticoagulation-related hospital admission, Anticoagulation-related ED visit, Anticoagulation-related fatality    Comments:   No changes in medications, activity, or diet noted. No concerns with clotting, bleeding, or increased bruising noted. Took warfarin as prescribed.  Patient is to continue maintenance warfarin plan, and check INR in 2 weeks.  Patient verbalizes understanding and agrees to plan. No further questions or concerns.           OBJECTIVE    INR Protime   Date Value Ref Range Status   2020 2.9 (A) 0.86 - 1.14 Final       ASSESSMENT / PLAN  INR assessment THER    Recheck INR In: 2 WEEKS    INR Location Clinic      Anticoagulation Summary  As of 2020    INR goal:   2.0-3.0   TTR:   57.0 % (4.1 mo)   INR used for dosin.9 (2020)   Warfarin maintenance plan:   5 mg (5 mg x 1) every Sun, Thu; 7.5 mg (5 mg x 1.5) all other days   Full warfarin instructions:   5 mg every Sun, Thu; 7.5 mg all other days   Weekly warfarin total:   47.5 mg   No change documented:   Thalia Sood RN   Plan last modified:   Sonja Stone RN (2020)   Next INR check:   2020   Priority:   Maintenance   Target end date:   3/27/2020    Indications    Pulmonary embolism (H) [I26.99]  Long term current use of anticoagulant therapy [Z79.01]             Anticoagulation Episode Summary     INR check location:       Preferred lab:       Send INR  reminders to:   TOMÁS WARE    Comments:   * 6 month therapy      Anticoagulation Care Providers     Provider Role Specialty Phone number    Yusef Katz MD [facility] [phone]            See the Encounter Report to view Anticoagulation Flowsheet and Dosing Calendar (Go to Encounters tab in chart review, and find the Anticoagulation Therapy Visit)        Thalia Sood RN

## 2020-02-26 ENCOUNTER — ANTICOAGULATION THERAPY VISIT (OUTPATIENT)
Dept: ANTICOAGULATION | Facility: CLINIC | Age: 45
End: 2020-02-26
Payer: COMMERCIAL

## 2020-02-26 DIAGNOSIS — I26.99 PULMONARY EMBOLISM (H): ICD-10-CM

## 2020-02-26 DIAGNOSIS — I26.99 ACUTE PULMONARY EMBOLISM WITHOUT ACUTE COR PULMONALE, UNSPECIFIED PULMONARY EMBOLISM TYPE (H): ICD-10-CM

## 2020-02-26 DIAGNOSIS — Z79.01 LONG TERM CURRENT USE OF ANTICOAGULANT THERAPY: ICD-10-CM

## 2020-02-26 LAB — INR POINT OF CARE: 2.7 (ref 0.86–1.14)

## 2020-02-26 PROCEDURE — 85610 PROTHROMBIN TIME: CPT | Mod: QW

## 2020-02-26 PROCEDURE — 36416 COLLJ CAPILLARY BLOOD SPEC: CPT

## 2020-02-26 PROCEDURE — 99207 ZZC NO CHARGE NURSE ONLY: CPT

## 2020-02-26 RX ORDER — WARFARIN SODIUM 5 MG/1
TABLET ORAL
Qty: 45 TABLET | Refills: 0 | Status: SHIPPED | OUTPATIENT
Start: 2020-02-26 | End: 2020-02-27

## 2020-02-26 RX ORDER — WARFARIN SODIUM 5 MG/1
TABLET ORAL
Qty: 35 TABLET | COMMUNITY
Start: 2020-02-26 | End: 2020-02-26

## 2020-02-26 NOTE — TELEPHONE ENCOUNTER
Signed Prescriptions:                        Disp   Refills    warfarin ANTICOAGULANT (COUMADIN) 5 MG tab*45 tab*0        Si mg Sun/Thurs; 7.5 mg all other days OR AS DIRECTED           BY  Authorizing Provider: VIVIENNE DONNELLY  Ordering User: JOYCE FISH RN refilled medication per [facility] refill protocol.  Joyce Fish RN

## 2020-02-26 NOTE — PROGRESS NOTES
ANTICOAGULATION FOLLOW-UP CLINIC VISIT    Patient Name:  Yusef Alvares  Date:  2020  Contact Type:  Face to Face    SUBJECTIVE:  Patient Findings     Comments:   No changes in medications, activity, health, or diet noted. No bleeding or increased bruising noted. Took warfarin as prescribed.  Patient will continue weekly maintenance dose. INR is therapeutic.   Recheck in 4 weeks. Verify if patient will be completed with therapy.  Patient verbalizes understanding and agrees to plan. No further questions or concerns.          Clinical Outcomes     Negatives:   Major bleeding event, Thromboembolic event, Anticoagulation-related hospital admission, Anticoagulation-related ED visit, Anticoagulation-related fatality    Comments:   No changes in medications, activity, health, or diet noted. No bleeding or increased bruising noted. Took warfarin as prescribed.  Patient will continue weekly maintenance dose. INR is therapeutic.   Recheck in 4 weeks. Verify if patient will be completed with therapy.  Patient verbalizes understanding and agrees to plan. No further questions or concerns.             OBJECTIVE    INR Protime   Date Value Ref Range Status   2020 2.7 (A) 0.86 - 1.14 Final       ASSESSMENT / PLAN  INR assessment THER    Recheck INR In: 4 WEEKS    INR Location Clinic      Anticoagulation Summary  As of 2020    INR goal:   2.0-3.0   TTR:   63.0 % (4.8 mo)   INR used for dosin.7 (2020)   Warfarin maintenance plan:   5 mg (5 mg x 1) every Sun, Thu; 7.5 mg (5 mg x 1.5) all other days   Full warfarin instructions:   5 mg every Sun, Thu; 7.5 mg all other days   Weekly warfarin total:   47.5 mg   No change documented:   Jordan Norman RN   Plan last modified:   Sonja Stone RN (2020)   Next INR check:   3/23/2020   Priority:   Maintenance   Target end date:   3/27/2020    Indications    Pulmonary embolism (H) [I26.99]  Long term current use of anticoagulant therapy [Z79.01]              Anticoagulation Episode Summary     INR check location:       Preferred lab:       Send INR reminders to:   TOMÁS WARE    Comments:   * 6 month therapy      Anticoagulation Care Providers     Provider Role Specialty Phone number    Yusef Katz MD [facility] [phone]            See the Encounter Report to view Anticoagulation Flowsheet and Dosing Calendar (Go to Encounters tab in chart review, and find the Anticoagulation Therapy Visit)        Jordan oNrman RN

## 2020-02-27 DIAGNOSIS — I26.99 ACUTE PULMONARY EMBOLISM WITHOUT ACUTE COR PULMONALE, UNSPECIFIED PULMONARY EMBOLISM TYPE (H): ICD-10-CM

## 2020-02-27 DIAGNOSIS — Z79.01 LONG TERM CURRENT USE OF ANTICOAGULANT THERAPY: ICD-10-CM

## 2020-02-27 DIAGNOSIS — I26.99 PULMONARY EMBOLISM (H): ICD-10-CM

## 2020-02-27 RX ORDER — WARFARIN SODIUM 5 MG/1
TABLET ORAL
Qty: 38 TABLET | Refills: 0 | Status: SHIPPED | OUTPATIENT
Start: 2020-02-27 | End: 2020-03-27

## 2020-02-27 NOTE — TELEPHONE ENCOUNTER
Prescription approved per [facility] Refill Protocol.    Juliet Dwyer RN - [facility]

## 2020-03-04 ENCOUNTER — OFFICE VISIT (OUTPATIENT)
Dept: FAMILY MEDICINE | Facility: CLINIC | Age: 45
End: 2020-03-04
Payer: COMMERCIAL

## 2020-03-04 VITALS
HEIGHT: 65 IN | OXYGEN SATURATION: 93 % | WEIGHT: 180 LBS | BODY MASS INDEX: 29.99 KG/M2 | SYSTOLIC BLOOD PRESSURE: 122 MMHG | TEMPERATURE: 98.3 F | HEART RATE: 84 BPM | DIASTOLIC BLOOD PRESSURE: 80 MMHG

## 2020-03-04 DIAGNOSIS — E78.5 HYPERLIPIDEMIA LDL GOAL <130: ICD-10-CM

## 2020-03-04 DIAGNOSIS — R73.9 HYPERGLYCEMIA: ICD-10-CM

## 2020-03-04 DIAGNOSIS — E03.9 HYPOTHYROIDISM, UNSPECIFIED TYPE: ICD-10-CM

## 2020-03-04 DIAGNOSIS — I26.99 PULMONARY EMBOLISM, OTHER, UNSPECIFIED CHRONICITY, UNSPECIFIED WHETHER ACUTE COR PULMONALE PRESENT (H): Primary | ICD-10-CM

## 2020-03-04 DIAGNOSIS — G47.33 OSA (OBSTRUCTIVE SLEEP APNEA): ICD-10-CM

## 2020-03-04 LAB
ANION GAP SERPL CALCULATED.3IONS-SCNC: 1 MMOL/L (ref 3–14)
BUN SERPL-MCNC: 19 MG/DL (ref 7–30)
CALCIUM SERPL-MCNC: 8.9 MG/DL (ref 8.5–10.1)
CHLORIDE SERPL-SCNC: 105 MMOL/L (ref 94–109)
CO2 SERPL-SCNC: 32 MMOL/L (ref 20–32)
CREAT SERPL-MCNC: 0.84 MG/DL (ref 0.66–1.25)
GFR SERPL CREATININE-BSD FRML MDRD: >90 ML/MIN/{1.73_M2}
GLUCOSE SERPL-MCNC: 87 MG/DL (ref 70–99)
HBA1C MFR BLD: 5.3 % (ref 0–5.6)
LDLC SERPL DIRECT ASSAY-MCNC: 103 MG/DL
POTASSIUM SERPL-SCNC: 3.8 MMOL/L (ref 3.4–5.3)
SODIUM SERPL-SCNC: 138 MMOL/L (ref 133–144)
T4 FREE SERPL-MCNC: 0.92 NG/DL (ref 0.76–1.46)
TSH SERPL DL<=0.005 MIU/L-ACNC: 0.63 MU/L (ref 0.4–4)

## 2020-03-04 PROCEDURE — 83036 HEMOGLOBIN GLYCOSYLATED A1C: CPT | Performed by: FAMILY MEDICINE

## 2020-03-04 PROCEDURE — 83721 ASSAY OF BLOOD LIPOPROTEIN: CPT | Performed by: FAMILY MEDICINE

## 2020-03-04 PROCEDURE — 84439 ASSAY OF FREE THYROXINE: CPT | Performed by: FAMILY MEDICINE

## 2020-03-04 PROCEDURE — 36415 COLL VENOUS BLD VENIPUNCTURE: CPT | Performed by: FAMILY MEDICINE

## 2020-03-04 PROCEDURE — 84443 ASSAY THYROID STIM HORMONE: CPT | Performed by: FAMILY MEDICINE

## 2020-03-04 PROCEDURE — 80048 BASIC METABOLIC PNL TOTAL CA: CPT | Performed by: FAMILY MEDICINE

## 2020-03-04 PROCEDURE — 99214 OFFICE O/P EST MOD 30 MIN: CPT | Performed by: FAMILY MEDICINE

## 2020-03-04 ASSESSMENT — MIFFLIN-ST. JEOR: SCORE: 1624.38

## 2020-03-04 NOTE — PROGRESS NOTES
"SUBJECTIVE:   Yusef Alvares is a 44 year old male who presents for Preventive Visit.  Chief Complaint   Patient presents with     Physical     Medicare Wellness. Did not fast today.     Sleep Problem     wants referral to sleep clinic.     Headache     started as having 1-3 times a week. But has decreased to 1-2 times a month, since he started anti-coag therapy.     He is too soon to have wellness exam.    Are you in the first 12 months of your Medicare Part B coverage?  No    Physical Health:    In general, how would you rate your overall physical health? excellent    Outside of work, how many days during the week do you exercise? none    Outside of work, approximately how many minutes a day do you exercise?    If you drink alcohol do you typically have >3 drinks per day or >7 drinks per week? No    Do you usually eat at least 4 servings of fruit and vegetables a day, include whole grains & fiber and avoid regularly eating high fat or \"junk\" foods? NO    Do you have any problems taking medications regularly?  No    Do you have any side effects from medications? none    Needs assistance for the following daily activities: transportation and has an ILS worker that helps    Which of the following safety concerns are present in your home?  lack of grab bars in the bathroom     Hearing impairment: No    In the past 6 months, have you been bothered by leaking of urine? no    Mental Health:    In general, how would you rate your overall mental or emotional health? excellent  PHQ-2 Score:  0    Do you feel safe in your environment? Yes    Have you ever done Advance Care Planning? (For example, a Health Directive, POLST, or a discussion with a medical provider or your loved ones about your wishes): Yes, patient states has an Advance Care Planning document and will bring a copy to the clinic.    Additional concerns to address?      Fall risk:  Fallen 2 or more times in the past year?: No  Any fall with injury in the past " year?: No    Cognitive Screenin) Repeat 3 items (Leader, Season, Table)    2) Clock draw: NORMAL  3) 3 item recall: Recalls 3 objects  Results: 3 items recalled: COGNITIVE IMPAIRMENT LESS LIKELY    Mini-CogTM Copyright S Lesa. Licensed by the author for use in [facility]; reprinted with permission (heaven@[facility]). All rights reserved.      Do you have sleep apnea, excessive snoring or daytime drowsiness?: yes. Needing supplies for his C-pap machine.    He needs to have referral for kaycee supplies and was to see sleep doc in the past.    He is wondering about his long term anticoag and the plan.  Did not seen hematology yet.    He has mild headache.  He has not used anything, did not know what he could take.    Reviewed and updated as needed this visit by clinical staff  Tobacco  Allergies  Meds  Problems  Med Hx  Surg Hx  Fam Hx  Soc Hx          Reviewed and updated as needed this visit by Provider  Tobacco  Allergies  Meds  Problems  Med Hx  Surg Hx  Fam Hx        Social History     Tobacco Use     Smoking status: Never Smoker     Smokeless tobacco: Never Used   Substance Use Topics     Alcohol use: No     Alcohol/week: 0.0 standard drinks                           Current providers sharing in care for this patient include:   Patient Care Team:  Yusef Katz MD as PCP - General (Family Practice)  Yusef Katz MD as Assigned PCP    The following health maintenance items are reviewed in Epic and correct as of today:  Health Maintenance   Topic Date Due     MENTAL HEALTH [place] PLAN  2016     PHQ-2  2020     MEDICARE ANNUAL WELLNESS VISIT  2020     DTAP/TDAP/TD IMMUNIZATION (3 - Td) 2022     LIPID  2024     HIV SCREENING  Completed     INFLUENZA VACCINE  Completed     IPV IMMUNIZATION  Completed     MENINGITIS IMMUNIZATION  Aged Out           ROS:  Review Of Systems  Skin: negative  Eyes: negative  Ears/Nose/Throat: negative  Respiratory: No  "shortness of breath, dyspnea on exertion, cough, or hemoptysis  Cardiovascular: negative  Gastrointestinal: negative  Genitourinary:   Musculoskeletal:   Neurologic: as above  Psychiatric: stable per patient  Hematologic/Lymphatic/Immunologic: negative  Endocrine: negative      OBJECTIVE:   /80 (Cuff Size: Adult Large)   Pulse 84   Temp 98.3  F (36.8  C) (Tympanic)   Ht 1.645 m (5' 4.75\")   Wt 81.6 kg (180 lb)   SpO2 93%   BMI 30.19 kg/m   Estimated body mass index is 30.19 kg/m  as calculated from the following:    Height as of this encounter: 1.645 m (5' 4.75\").    Weight as of this encounter: 81.6 kg (180 lb).  EXAM:   GENERAL APPEARANCE: alert, no distress and cooperative  HENT: ear canals and TM's normal and nose and mouth without ulcers or lesions  NECK: no adenopathy, no asymmetry, masses, or scars and thyroid normal to palpation  RESP: lungs clear to auscultation - no rales, rhonchi or wheezes  CV: regular rates and rhythm, normal S1 S2, no S3 or S4 and no murmur, click or rub  ABDOMEN: soft, nontender, without hepatosplenomegaly or masses and bowel sounds normal  MS: extremities normal- no gross deformities noted  SKIN: no suspicious lesions or rashes  NEURO: Normal strength and tone, mentation intact and speech normal  PSYCH: mentation appears normal and affect normal/bright        ASSESSMENT / PLAN:   (I26.99) Pulmonary embolism, other, unspecified chronicity, unspecified whether acute cor pulmonale present (H)  (primary encounter diagnosis)  Comment: needs referral to hematology to see if this is to continue, did have PE with them not being provoked. Suspect life long use  Plan: Oncology/Hematology Adult Referral            (E78.5) Hyperlipidemia LDL goal <130  Comment: needs to have lab checked did not follow through last fall  Plan: LDL cholesterol direct            (E03.9) Hypothyroidism, unspecified type  Comment: need to have labs checked  Plan:     (R73.9) Hyperglycemia  Comment: need " "to have follow up  Plan: Basic metabolic panel            (G47.33) ABDULKADIR (obstructive sleep apnea)  Comment: referral is done  Plan: SLEEP EVALUATION & MANAGEMENT REFERRAL - ADULT         -[facility] [place]          [phone] (Age 15 and up)              COUNSELING:      Estimated body mass index is 30.19 kg/m  as calculated from the following:    Height as of this encounter: 1.645 m (5' 4.75\").    Weight as of this encounter: 81.6 kg (180 lb).         reports that he has never smoked. He has never used smokeless tobacco.      Appropriate preventive services were discussed with this patient, including applicable screening as appropriate for cardiovascular disease, diabetes, osteopenia/osteoporosis, and glaucoma.  As appropriate for age/gender, discussed screening for colorectal cancer, prostate cancer, breast cancer, and cervical cancer. Checklist reviewing preventive services available has been given to the patient.    Reviewed patients plan of care and provided an AVS. The  for Yusef meets the Care Plan requirement. This Care Plan has been established and reviewed with the Patient.    Counseling Resources:  ATP IV Guidelines  Pooled Cohorts Equation Calculator  Breast Cancer Risk Calculator  FRAX Risk Assessment  ICSI Preventive Guidelines  Dietary Guidelines for Americans, 2010  [employer]'s MyPlate  ASA Prophylaxis  Lung CA Screening    Yusef Katz MD  [facility]  "

## 2020-03-04 NOTE — PATIENT INSTRUCTIONS
Please make your annual wellness exam after 8/20/2020.    Please go to lab.    Please see hematology regarding your use of your blood thinner.    For your headache you can use acetaminophen or tylenol.  Use acetaminophen 500 mg tabs, 1-2 tabs every 6 hours as needed, remember not to use more than 4,000mg in 24 hours and to include other medications that may have acetaminophen.            Thank you for choosing [facility].  You may be receiving an email and/or telephone survey request from [facility] Customer Experience regarding your visit today.  Please take a few minutes to respond to the survey to let us know how we are doing.      If you have questions or concerns, please contact us via [employer] or you can contact your care team at [phone].    Our Clinic hours are:  Monday 6:40 am  to 7:00 pm  Tuesday -Friday 6:40 am to 5:00 pm    The [place] outpatient lab hours are:  Monday - Friday 6:10 am to 4:45 pm  Saturdays 7:00 am to 11:00 am  Appointments are required, call [phone]    If you have clinical questions after hours or would like to schedule an appointment,  call the clinic at [phone].

## 2020-03-05 ENCOUNTER — PRE VISIT (OUTPATIENT)
Dept: ONCOLOGY | Facility: CLINIC | Age: 45
End: 2020-03-05

## 2020-03-05 NOTE — TELEPHONE ENCOUNTER
ONCOLOGY INTAKE: Records Information      APPT INFORMATION:  Referring provider:  Dr. Katz  Referring provider s clinic:  [facility]  Reason for visit/diagnosis:  Pulmonary embolism, other, unspecified chronicity, unspecified whether acute cor pulmonale present  Has patient been notified of appointment date and time?: Yes scheduled for 3/17/20 in [place].    RECORDS INFORMATION:  Were the records received with the referral (via [employer])? No    Has patient been seen for any external appt for this diagnosis? No    If yes, where? N/A    Has patient had any imaging or procedures outside of [facility] for this condition? No      If Yes, where? N/A    ADDITIONAL INFORMATION:  Patient called and confirmed for 3/17/20 with Dr. Romo in [place].

## 2020-03-06 NOTE — TELEPHONE ENCOUNTER
RECORDS STATUS - ALL OTHER DIAGNOSIS      RECORDS RECEIVED FROM: Epic/CE   DATE RECEIVED: 3/17/2020    NOTES STATUS DETAILS   OFFICE NOTE from referring provider [facility]   OFFICE NOTE from medical oncologist N/A    DISCHARGE SUMMARY from hospital Complete 9/25/2019 Acute Pulmonary Embolism    DISCHARGE REPORT from the ER See Hospital Note Above     OPERATIVE REPORT N/A    MEDICATION LIST Complete [facility]   CLINICAL TRIAL TREATMENTS TO DATE N/A    LABS     PATHOLOGY REPORTS N/A    ANYTHING RELATED TO DIAGNOSIS Complete Labs last updated on 3/4/2020    GENONOMIC TESTING     TYPE:     IMAGING (NEED IMAGES & REPORT)     CT SCANS Complete CT Chest Pulmonary 12/11/2019 9/25/2019    MRI     MAMMO     ULTRASOUND     PET       Action    Action Taken 3/6/2020 4:15pm     I called pt K[facility] to check on any outside records. I left a vm requesting a call back on Monday (next week)

## 2020-03-17 ENCOUNTER — PRE VISIT (OUTPATIENT)
Dept: ONCOLOGY | Facility: CLINIC | Age: 45
End: 2020-03-17

## 2020-03-20 DIAGNOSIS — I26.99 PULMONARY EMBOLISM (H): Primary | ICD-10-CM

## 2020-04-09 ENCOUNTER — ANTICOAGULATION THERAPY VISIT (OUTPATIENT)
Dept: ANTICOAGULATION | Facility: CLINIC | Age: 45
End: 2020-04-09

## 2020-04-09 DIAGNOSIS — I26.99 PULMONARY EMBOLISM (H): ICD-10-CM

## 2020-04-09 DIAGNOSIS — Z79.01 LONG TERM CURRENT USE OF ANTICOAGULANT THERAPY: ICD-10-CM

## 2020-04-09 LAB
CAPILLARY BLOOD COLLECTION: NORMAL
INR PPP: 2.2 (ref 0.86–1.14)

## 2020-04-09 PROCEDURE — 36416 COLLJ CAPILLARY BLOOD SPEC: CPT | Performed by: FAMILY MEDICINE

## 2020-04-09 PROCEDURE — 85610 PROTHROMBIN TIME: CPT | Performed by: FAMILY MEDICINE

## 2020-04-09 PROCEDURE — 99207 ZZC NO CHARGE NURSE ONLY: CPT

## 2020-04-09 NOTE — PROGRESS NOTES
ANTICOAGULATION FOLLOW-UP CLINIC VISIT    Patient Name:  Yusef Alvares  Date:  4/9/2020  Contact Type:  Telephone    SUBJECTIVE:  Patient Findings     Comments:   Patient reports no changes in medication, activity, or diet. Patient reports no changes in health. Patient reports has taken warfarin as instructed. Patient reports no increased bruising or bleeding and no signs or symptoms of a blood clot.   Per office visit notes with PCP patient was advised to discuss with hematology if he is okay to stop warfarin. Patient will call the clinic to see about doing a virtual visit regarding this. Patient will continue current dose, recheck INR in 4 weeks, and is aware if he is given the okay to stop warfarin the appt can be cancelled.   Patient to call [facility] with any changes or concerns. Patient verbalized understanding of all instructions, denies questions or concerns at this time.             Clinical Outcomes     Negatives:   Major bleeding event, Thromboembolic event, Anticoagulation-related hospital admission, Anticoagulation-related ED visit, Anticoagulation-related fatality    Comments:   Patient reports no changes in medication, activity, or diet. Patient reports no changes in health. Patient reports has taken warfarin as instructed. Patient reports no increased bruising or bleeding and no signs or symptoms of a blood clot.   Per office visit notes with PCP patient was advised to discuss with hematology if he is okay to stop warfarin. Patient will call the clinic to see about doing a virtual visit regarding this. Patient will continue current dose, recheck INR in 4 weeks, and is aware if he is given the okay to stop warfarin the appt can be cancelled.   Patient to call [facility] with any changes or concerns. Patient verbalized understanding of all instructions, denies questions or concerns at this time.                OBJECTIVE    INR   Date Value Ref Range Status   04/09/2020 2.20 (H) 0.86 - 1.14 Final     Comment:     This  test is intended for monitoring Coumadin therapy.  Results are not   accurate in patients with prolonged INR due to factor deficiency.         ASSESSMENT / PLAN  INR assessment THER    Recheck INR In: 4 WEEKS    INR Location Outside lab      Anticoagulation Summary  As of 2020    INR goal:   2.0-3.0   TTR:   71.6 % (6.2 mo)   INR used for dosin.20 (2020)   Warfarin maintenance plan:   5 mg (5 mg x 1) every Sun, Thu; 7.5 mg (5 mg x 1.5) all other days   Full warfarin instructions:   5 mg every Sun, Thu; 7.5 mg all other days   Weekly warfarin total:   47.5 mg   No change documented:   Sonja Stone RN   Plan last modified:   Sonja Stone RN (2020)   Next INR check:   2020   Priority:   Maintenance   Target end date:   3/27/2020    Indications    Pulmonary embolism (H) [I26.99]  Long term current use of anticoagulant therapy [Z79.01]             Anticoagulation Episode Summary     INR check location:       Preferred lab:       Send INR reminders to:   [facility]    Comments:   * 6 month therapy      Anticoagulation Care Providers     Provider Role Specialty Phone number    Yusef Katz MD [facility] [phone]            See the Encounter Report to view Anticoagulation Flowsheet and Dosing Calendar (Go to Encounters tab in chart review, and find the Anticoagulation Therapy Visit)      Sonja Stone RN

## 2020-04-16 ENCOUNTER — VIRTUAL VISIT (OUTPATIENT)
Dept: FAMILY MEDICINE | Facility: OTHER | Age: 45
End: 2020-04-16

## 2020-04-16 ENCOUNTER — NURSE TRIAGE (OUTPATIENT)
Dept: NURSING | Facility: CLINIC | Age: 45
End: 2020-04-16

## 2020-04-16 NOTE — TELEPHONE ENCOUNTER
Feeling fine    Feeling nervous about COVID    Questions answered    COVID 19 Nurse Triage Plan/Patient Instructions    Please be aware that novel coronavirus (COVID-19) may be circulating in the community. If you develop symptoms such as fever, cough, or SOB or if you have concerns about the presence of another infection including coronavirus (COVID-19), please contact your health care provider or visit [email or web address].     Disposition/Instructions    Patient to stay at home and follow home care protocol based instructions.     Thank you for limiting contact with others, wearing a simple mask to cover your cough, practice good hand hygiene habits and accessing our virtual services where possible to limit the spread of this virus.    For more information about COVID19 and options for caring for yourself at home, please visit the CDC website at https://www.cdc.gov/coronavirus/2019-ncov/about/steps-when-sick.html  For more options for care at [facility], please visit our website at [email or web address]    For more information, please use the [facility] ([facility]) COVID-19 Hotlines (Interpreters available):     Health questions: Phone Number: [phone] or [phone] and Hours: 7 a.m. to 7 p.m.    Schools and  questions: Phone Number: [phone] or [phone] and Hours 7 a.m. to 7 p.m.        Hallie Red RN            Reason for Disposition    COVID -19, questions about    Additional Information    Negative: SEVERE difficulty breathing (e.g., struggling for each breath, speak in single words, bluish lips)    Negative: Sounds like a life-threatening emergency to the triager    Negative: [1] Difficulty breathing occurs AND [2] within 14 days of COVID-19 EXPOSURE (Close Contact)    Negative: Patient sounds very sick or weak to the triager    Negative: [1] Fever (or feeling feverish) OR cough AND [2] within 14 Days of COVID-19 EXPOSURE (Close  Contact)    Negative: [1] Fever (or feeling feverish) OR cough occurs AND [2] within 14 days of travel from another country (international travel)    Negative: [1] Fever (or feeling feverish) OR cough occurs AND [2] within 14 days of travel from a city or area with major community spread    Negative: [1] Fever (or feeling feverish) OR cough occurs AND [2] living in area with major community spread AND [3] testing being done for symptoms    Negative: [1] Mild body aches, chills, diarrhea, headache, runny nose, or sore throat AND [2] within 14 days of COVID-Exposure    Negative: [1] COVID-19 EXPOSURE within last 14 days AND [2] NO cough, fever, or breathing difficulty AND [3] exposed person is a healthcare worker who was NOT using all recommended personal protective equipment (i.e., a respirator-N95 mask, eye protection, gloves, and gown)    Protocols used: CORONAVIRUS (COVID-19) EXPOSURE-A- 3.30.20

## 2020-04-16 NOTE — PROGRESS NOTES
"Date: 2020 12:28:11  Clinician: Luigi Nieves  Clinician NPI: [id number]  Patient: Yusef Alvares  Patient : 1975  Patient Address: [address], [place] [address]  Patient Phone: [phone]  Visit Protocol: URI  Patient Summary:  Yusef is a 44 year old ( : 1975 ) male who initiated a Visit for COVID-19 (Coronavirus) evaluation and screening. When asked the question \"Please sign me up to receive news, health information and promotions. \", Yusef responded \"Yes\".    Yusef states his symptoms started suddenly 1 month or more ago.   His symptoms consist of a cough.   Symptom details   Cough: Yusef coughs a few times an hour and his cough is not more bothersome at night. Phlegm does not come into his throat when he coughs. He believes his cough is caused by post-nasal drip.    Yusef denies having rhinitis, enlarged lymph nodes, chills, diarrhea, facial pain or pressure, myalgias, vomiting, nausea, teeth pain, headache, wheezing, sore throat, nasal congestion, malaise, ear pain, and fever. He also denies taking antibiotic medication for the symptoms, double sickening (worsening symptoms after initial improvement), and having recent facial or sinus surgery in the past 60 days. He is not experiencing dyspnea.   Precipitating events  He has not recently been exposed to someone with influenza. Yusef has been in close contact with the following high risk individuals: adults 65 or older.   Pertinent COVID-19 (Coronavirus) information  Yusef has not traveled internationally or to the areas where COVID-19 (Coronavirus) is widespread, including cruise ship travel in the last 14 days before the start of his symptoms.   Yusef does not work or volunteer as healthcare worker or a  and does not work or volunteer in a healthcare facility.   He does not live with a healthcare worker.   Yusef has not had a close contact with a laboratory-confirmed COVID-19 patient within 14 days " of symptom onset. He also has not had a close contact with a suspected COVID-19 patient within 14 days of symptom onset.   Pertinent medical history  Yusef does not need a return to work/school note.   Weight: 180 lbs   Yusef does not smoke or use smokeless tobacco.   Additional information as reported by the patient (free text): I went to [facility], and the doctor and everyone there were not wearing protection. I wonder if I was exposed to COVID-19. I was wondering if I needed a test for it. Thank you.   Weight: 180 lbs    MEDICATIONS: citalopram oral, triamcinolone-dimethicone topical, naltrexone oral, Abilify oral, simvastatin oral, warfarin oral, Strattera oral, levothyroxine oral, ALLERGIES: NKDA  Clinician Response:  Dear Yusef,   Dear Yusef  Your symptoms show that you may have coronavirus (COVID-19). This illness can cause fever, cough and trouble breathing. Many people get a mild case and get better on their own. Some people can get very sick.   Will I be tested for COVID-19?  Because the virus is spreading, we are no longer testing most patients. You may request testing if:   You are very ill. For example, you're on chemotherapy, dialysis or home hospice care. (Contact your specialty clinic or program.)   You live in a nursing home or other long-term care facility. (Talk to your nurse manager or medical director.)   You're a health care worker. (Contact your employee health office.)   How can I protect others?  Without a test, we can't know for sure that you have COVID-19. For safety, it's very important to follow these rules.  First, stay home and away from others (self-isolate) until:   You've had no fever---and no medicine that reduces fever---for 3 full days (72 hours). And...    Your other symptoms have gotten better. For example, your cough or breathing has improved. And...   At least 7 days have passed since your symptoms started.   During this time:   Don't go to work,  school or anywhere else.    Stay away from others in your home. No hugging, kissing or shaking hands.   Don't let anyone visit.   Cover your mouth and nose with a mask, tissue or wash cloth to avoid spreading germs.   Wash your hands and face often. Use soap and water.   How can I take care of myself?   1.Take Tylenol (acetaminophen) for fever or pain. If you have liver or kidney problems, ask your family doctor if it's okay to take Tylenol.        Adults can take either:    650 mg (two 325 mg pills) every 4 to 6 hours, or...   1,000 mg (two 500 mg pills) every 8 hours as needed.    Note: Don't take more than 3,000 mg in one day.   For children, check the Tylenol bottle for the right dose. The dose is based on the child's age or weight.   2.If you have other health problems (like cancer, heart failure, an organ transplant or severe kidney disease): Call your specialty clinic if you don't feel better in the next 2 days.       3.Know when to call 911: If your breathing is so bad that it keeps you from doing normal activities, call 911 or go to the emergency room. Tell them that you've been staying home and may have COVID-19.    Additional Symptomatic Treatment Recommendations:   Push fluids  Get Plenty of rest  Tylenol to help with pain or fever  Salt water gargles or lozenges to help with sore throat  Humidified air can help with congestion.   Over the counter nasal spray such as Fluticasone or Afrin nasal spray to help with sinus congestion  Over the counter cough/cold medication such as Delsym, Dayquil, Nyquil, Mucinex DM, or store brand equivalents as needed.&nbsp;  Over the counter decongestant such as Sudafed as needed (if have high blood pressure ask pharmacist about decongestants that are safe with high blood pressure)  Can do a cool compress to the forehead or back of neck to help with headache      Where can I get more information?  To learn more about COVID-19 and how to care for yourself at home, please  visit the CDC website at https://www.cdc.gov/coronavirus/2019-ncov/about/steps-when-sick.html.  For more about your care at [facility], please visit [email or web address]        Diagnosis: Cough  Diagnosis ICD: R05

## 2020-05-11 ENCOUNTER — ANTICOAGULATION THERAPY VISIT (OUTPATIENT)
Dept: ANTICOAGULATION | Facility: CLINIC | Age: 45
End: 2020-05-11

## 2020-05-11 DIAGNOSIS — I26.99 PULMONARY EMBOLISM (H): ICD-10-CM

## 2020-05-11 DIAGNOSIS — Z79.01 LONG TERM CURRENT USE OF ANTICOAGULANT THERAPY: ICD-10-CM

## 2020-05-11 LAB
CAPILLARY BLOOD COLLECTION: NORMAL
INR PPP: 3 (ref 0.86–1.14)

## 2020-05-11 PROCEDURE — 85610 PROTHROMBIN TIME: CPT | Performed by: FAMILY MEDICINE

## 2020-05-11 PROCEDURE — 36416 COLLJ CAPILLARY BLOOD SPEC: CPT | Performed by: FAMILY MEDICINE

## 2020-06-08 ENCOUNTER — NURSE TRIAGE (OUTPATIENT)
Dept: NURSING | Facility: CLINIC | Age: 45
End: 2020-06-08

## 2020-06-08 ENCOUNTER — NURSE TRIAGE (OUTPATIENT)
Dept: FAMILY MEDICINE | Facility: CLINIC | Age: 45
End: 2020-06-08

## 2020-06-08 ENCOUNTER — ANTICOAGULATION THERAPY VISIT (OUTPATIENT)
Dept: FAMILY MEDICINE | Facility: CLINIC | Age: 45
End: 2020-06-08

## 2020-06-08 DIAGNOSIS — Z79.01 LONG TERM CURRENT USE OF ANTICOAGULANT THERAPY: ICD-10-CM

## 2020-06-08 DIAGNOSIS — Z79.01 LONG TERM CURRENT USE OF ANTICOAGULANT THERAPY: Primary | ICD-10-CM

## 2020-06-08 DIAGNOSIS — I26.99 PULMONARY EMBOLISM (H): ICD-10-CM

## 2020-06-08 DIAGNOSIS — I26.99 PULMONARY EMBOLISM, OTHER, UNSPECIFIED CHRONICITY, UNSPECIFIED WHETHER ACUTE COR PULMONALE PRESENT (H): ICD-10-CM

## 2020-06-08 LAB
CAPILLARY BLOOD COLLECTION: NORMAL
INR PPP: 2.3 (ref 0.86–1.14)

## 2020-06-08 PROCEDURE — 99207 ZZC NO CHARGE NURSE ONLY: CPT | Performed by: FAMILY MEDICINE

## 2020-06-08 PROCEDURE — 36416 COLLJ CAPILLARY BLOOD SPEC: CPT | Performed by: FAMILY MEDICINE

## 2020-06-08 PROCEDURE — 85610 PROTHROMBIN TIME: CPT | Performed by: FAMILY MEDICINE

## 2020-06-08 NOTE — PROGRESS NOTES
Anticoagulation Management    Unable to reach Kosta today.    Today's INR result of 2.30 is therapeutic (goal INR of 2.0-3.0).  Result received from: Clinic Lab    Follow up required to confirm warfarin dose taken and assess for changes    Left message with female to have the patient call back. No dosing isntructions given.      Anticoagulation clinic to follow up    Jordan Norman RN  [phone]

## 2020-06-08 NOTE — PROGRESS NOTES
ANTICOAGULATION FOLLOW-UP CLINIC VISIT    Patient Name:  Yusef Alvares  Date:  2020  Contact Type:  Telephone    SUBJECTIVE:  Patient Findings     Comments:   No changes in medications, activity, health, or diet noted. No bleeding or increased bruising noted. Took warfarin as prescribed.  Patient will continue weekly maintenance dose. INR is therapeutic.   Recheck in 6 weeks.  Patient verbalizes understanding and agrees to plan. No further questions or concerns.          Clinical Outcomes     Negatives:   Major bleeding event, Thromboembolic event, Anticoagulation-related hospital admission, Anticoagulation-related ED visit, Anticoagulation-related fatality    Comments:   No changes in medications, activity, health, or diet noted. No bleeding or increased bruising noted. Took warfarin as prescribed.  Patient will continue weekly maintenance dose. INR is therapeutic.   Recheck in 6 weeks.  Patient verbalizes understanding and agrees to plan. No further questions or concerns.             OBJECTIVE    Recent labs: (last 7 days)     20  0922   INR 2.30*       ASSESSMENT / PLAN  INR assessment THER    Recheck INR In: 6 WEEKS    INR Location Outside lab      Anticoagulation Summary  As of 2020    INR goal:   2.0-3.0   TTR:   78.5 % (8.2 mo)   INR used for dosin.30 (2020)   Warfarin maintenance plan:   5 mg (5 mg x 1) every Sun, Thu; 7.5 mg (5 mg x 1.5) all other days   Full warfarin instructions:   5 mg every Sun, Thu; 7.5 mg all other days   Weekly warfarin total:   47.5 mg   No change documented:   Jordan Norman RN   Plan last modified:   Sonja Stone RN (2020)   Next INR check:   2020   Priority:   Maintenance   Target end date:   3/27/2020    Indications    Pulmonary embolism (H) [I26.99]  Long term current use of anticoagulant therapy [Z79.01]             Anticoagulation Episode Summary     INR check location:       Preferred lab:       Send INR reminders to:   [facility]  [place]    Comments:   * 6 month therapy      Anticoagulation Care Providers     Provider Role Specialty Phone number    Yusef Katz MD [facility] [phone]            See the Encounter Report to view Anticoagulation Flowsheet and Dosing Calendar (Go to Encounters tab in chart review, and find the Anticoagulation Therapy Visit)        Jordan Norman RN

## 2020-06-08 NOTE — TELEPHONE ENCOUNTER
I did a prior referral.  I will do another one and they should be contacting him.  If they do not within a week please give him the phone number to call hematology/oncology here in [place], [place].  Sincerely,  Yusef Katz MD

## 2020-06-08 NOTE — TELEPHONE ENCOUNTER
06/08/20    Dr. Katz,     The patient has a referral for Hematology/Oncology placed as a future order. This was done in March. Patient was to be on Coumadin for 6 months and it has been 8 months. Patient states no one has contacted him from Hematology. Can you verify the referral was sent to Hematology so the patient can  forward. Thank you.    Jordan Norman, RN, BSN, PHN  Anticoagulation Clinic   [phone]

## 2020-06-09 NOTE — TELEPHONE ENCOUNTER
Duplicate. Error.     Marcelina Smith, RN   [facility] RN Triage

## 2020-06-09 NOTE — TELEPHONE ENCOUNTER
Patient returned call. Pt was notified of message from Dr. Katz below.  Pt had no further questions/concerns and will call back in 1 week if he does not hear back from hematology regarding appointment.     Marcelina Smith, RN   [facility] RN Triage     Additional Information    [1] Follow-up call to recent contact AND [2] information only call, no triage required    Negative: [1] Caller is not with the adult (patient) AND [2] probable NON-URGENT symptoms    Negative: Question about upcoming scheduled test, no triage required and triager able to answer question    Negative: Health Information question, no triage required and triager able to answer question    Negative: Requesting regular office appointment    Negative: [1] Caller requesting NON-URGENT health information AND [2] PCP's office is the best resource    Negative: General information question, no triage required and triager able to answer question    Negative: [1] Caller is not with the adult (patient) AND [2] reporting urgent symptoms    Negative: Lab result questions    Negative: Medication questions    Negative: Caller can't be reached by phone    Negative: Caller has already spoken to PCP or another triager    Negative: RN needs further essential information from caller in order to complete triage    Protocols used: INFORMATION ONLY CALL-A-

## 2020-06-10 ENCOUNTER — TELEPHONE (OUTPATIENT)
Dept: ONCOLOGY | Facility: CLINIC | Age: 45
End: 2020-06-10

## 2020-06-11 ENCOUNTER — TELEPHONE (OUTPATIENT)
Dept: ONCOLOGY | Facility: CLINIC | Age: 45
End: 2020-06-11

## 2020-06-11 NOTE — TELEPHONE ENCOUNTER
Call patient.Left  with hours and phone. Letter sent for follow up. Referral in [facility].

## 2020-06-14 NOTE — PROGRESS NOTES
Type of Visit: Video Visit  Patient has given verbal consent for Video visit.     Video Start Time: 12:50 pm  Video End Time: 1:40 pm    Originating Location (pt. Location): Home     Distant Location (provider location):  [facility]      Platform used for Video Visit: [facility] PATIENT HEMATOLOGY CONSULT      REQUESTING PROVIDER/SERVICE: Dr. Kosta Katz, [facility]    PATIENT NAME: Yusef Alvares   MRN# [id number]     Date of Visit: Jun 18, 2020     YOB: 1975       REASON FOR CONSULTATION/CC:    9/2019 Bilateral subsegmental pulmonary emboli none provoked      HISTORY OF HEMATOLLOGY ILLNESS:    He presented at age 43 in September 2019 with sudden onset of moderate constant chest pain on both sides, with low-grade temperature, short of breath, small amount of hemoptysis.    Work-up identified bilateral segmental pulmonary embolism.  He was admitted for anticoagulation eventually transitioned into oral Coumadin.    Repeat CT chest December 2019 identified resolved PE.    He has no bleeding complications from Coumadin use.  His INR has been within therapeutic range.    He is seen hematology first time today for consultation regarding the duration of his anticoagulation.      PAST MEDICAL HISTORY  hyperlipidemia  ABDULKADIR  Hypo T  LBP with right side siatica  Schizoaffective disorder, schizophrenia    MEDICATIONS/ALLERY:  Reviewed in Epic system.        SOCIAL HISTORY:    Lives by himself not working.     FAMILY HISTORY:    Cousin had blood clot at young age      REVIEW OF SYSTEMS:   A 10-point review of systems was performed. Pertinent findings are noted in the HPI.    GENERAL: pt is in usual state of health.  No B symptoms  NEURO:   No headache, double vision, or focal weakness.  No neuropathy.   SKIN:  No chronic skin rash or skin infection.   CARDIOVASCULAR:  No High blood pressure or hyperlipidemia. NO OLVERA  PULMONARY: see HPI  GI:  No abdominal pain, nausea, vomiting, constipation.  No diarrhea.   No bright red blood per rectum.   :  No urgency, frequency.  No recurrent urinary tract infection.  No kidney problems.   RHEUMATOLOGY/MUSCULOSKELETAL SYSTEM: + OA. No pain.    ENDOCRINE:  No history of diabetes, + hypor thyroid. No complaints of hot flashes.   HEMATOLOGY:   Non-provoked bilateral PE September 2019 at age 43.    Oncology: no hx of cancers  IMMUNOLOGY:  No recurrent fever or chills episode. No recurrent infectious episode  PSYCHIATRY:  Schizoaffective disorder, schizophrenia         PHYSICAL EXAMINATION ATTAINABLE DURING VIDEO VISIT:  CONSTITUTIONAL - Pt looks like stated age, pleasant, not in acute distress. Not obese.  NEURO: Oriented to time, person, and places. No tremor. Normal gait.   ENT, MOUTH: Pupils are equal.  Sclerae are anicteric.  Moist oral mucosa. No oral thrush.   NECK:  No jugular venous distention.  No thyroid enlargement.   RESPIRATORY: talk nl, no sob during conversation, no cough.   MUSCULOSKELETAL/EXTREMITIES:  No edema.  No joint deformity. Normal range of motion.  SKIN:  No petechiae.  No rash.  No signs of cellulitis.   PSYCHIATRIC: Normal mood and affect. Good memory. Proper insight and judgement.   THE REST OF A COMPREHENSIVE PHYSICIAL EXAM IS DEFERRED DUE TO COVID-19 PUBLIC HEALTH EMERGENCY RELATED VIDEO VISIT RESCTRICTION.        CURRENT LAB DATA REVIEWED TODAY WITH PATIENT during visit:  INR 2-3 in 6/2020        CURRENT IMAGING REVIEWED TODAY WITH PATIENT during visit:  12/2019 CT - PE resolved.        OLD DATA REVIEWED TODAY WITH SUMMARY  CT 9/2019 CT - Bilateral subsegmental pulmonary emboli.  US 9/2019 - no DVT      ASSESSMENT AND PLAN DISCUSSED WITH PATIENT TODAY:   Non-provoked bilateral segmental PE September 2019 at age 43.  He does not have strong family history.    He has schizophrenia, is anxious to know how long he needs to be on blood thinner.    I explained to him the duration of anticoagulation depends on a lot of factors in terms of the nature of the  clot, genetic predisposition, comorbidities, etc.    Recommend him to consider of hemophilia work-up.  Due to interaction with Coumadin on some of the work-up test, advised him to hold Coumadin for 4 to 5 days, then proceed with the blood work, then resume Coumadin.    Final hematologic recommendation will be based on the above further work-up.    Virtual VISIT time is 50 minutes, spent in dicussion and review patient's PE history, labs and images results, further treatment recommendations, follow up plan.

## 2020-06-18 ENCOUNTER — PRE VISIT (OUTPATIENT)
Dept: ONCOLOGY | Facility: CLINIC | Age: 45
End: 2020-06-18

## 2020-06-18 ENCOUNTER — VIRTUAL VISIT (OUTPATIENT)
Dept: ONCOLOGY | Facility: CLINIC | Age: 45
End: 2020-06-18
Attending: INTERNAL MEDICINE
Payer: COMMERCIAL

## 2020-06-18 DIAGNOSIS — Z79.01 LONG TERM CURRENT USE OF ANTICOAGULANT THERAPY: ICD-10-CM

## 2020-06-18 DIAGNOSIS — I26.99 PULMONARY EMBOLISM, OTHER, UNSPECIFIED CHRONICITY, UNSPECIFIED WHETHER ACUTE COR PULMONALE PRESENT (H): ICD-10-CM

## 2020-06-18 PROCEDURE — 99204 OFFICE O/P NEW MOD 45 MIN: CPT | Mod: 95 | Performed by: INTERNAL MEDICINE

## 2020-06-18 PROCEDURE — 40001009 ZZH VIDEO/TELEPHONE VISIT; NO CHARGE

## 2020-06-18 NOTE — PROGRESS NOTES
"Yusef Alvares is a 44 year old male who is being evaluated via a billable video visit.      The patient has been notified of following:     \"This video visit will be conducted via a call between you and your physician/provider. We have found that certain health care needs can be provided without the need for an in-person physical exam.  This service lets us provide the care you need with a video conversation.  If a prescription is necessary we can send it directly to your pharmacy.  If lab work is needed we can place an order for that and you can then stop by our lab to have the test done at a later time.    Video visits are billed at different rates depending on your insurance coverage.  Please reach out to your insurance provider with any questions.    If during the course of the call the physician/provider feels a video visit is not appropriate, you will not be charged for this service.\"    Patient has given verbal consent for Video visit? Yes    [email or web address]   [phone]    Will anyone else be joining your video visit? No        Crystal Canela CMA    "

## 2020-06-18 NOTE — LETTER
6/18/2020         RE: Yusef Alvares  [address] [facility] [place] [address]        Dear Colleague,    Thank you for referring your patient, Yusef Alvares, to the [facility]. Please see a copy of my visit note below.    Type of Visit: Video Visit  Patient has given verbal consent for Video visit.     Video Start Time: 12:50 pm  Video End Time: 1:40 pm    Originating Location (pt. Location): Home     Distant Location (provider location):  [facility]      Platform used for Video Visit: [facility]       NEW PATIENT HEMATOLOGY CONSULT      REQUESTING PROVIDER/SERVICE: Dr. Kosta Katz, [facility]    PATIENT NAME: Yusef Alvares   MRN# [id number]     Date of Visit: Jun 18, 2020     YOB: 1975       REASON FOR CONSULTATION/CC:    9/2019 Bilateral subsegmental pulmonary emboli none provoked      HISTORY OF HEMATOLLOGY ILLNESS:    He presented at age 43 in September 2019 with sudden onset of moderate constant chest pain on both sides, with low-grade temperature, short of breath, small amount of hemoptysis.    Work-up identified bilateral segmental pulmonary embolism.  He was admitted for anticoagulation eventually transitioned into oral Coumadin.    Repeat CT chest December 2019 identified resolved PE.    He has no bleeding complications from Coumadin use.  His INR has been within therapeutic range.    He is seen hematology first time today for consultation regarding the duration of his anticoagulation.      PAST MEDICAL HISTORY  hyperlipidemia  ABDULKADIR  Hypo T  LBP with right side siatica  Schizoaffective disorder, schizophrenia    MEDICATIONS/ALLERY:  Reviewed in Epic system.        SOCIAL HISTORY:    Lives by himself not working.     FAMILY HISTORY:    Cousin had blood clot at young age      REVIEW OF SYSTEMS:   A 10-point review of systems was performed. Pertinent findings are noted in the HPI.    GENERAL: pt is in usual state of health.  No B symptoms  NEURO:   No headache, double vision, or focal  weakness.  No neuropathy.   SKIN:  No chronic skin rash or skin infection.   CARDIOVASCULAR:  No High blood pressure or hyperlipidemia. NO OLVERA  PULMONARY: see HPI  GI:  No abdominal pain, nausea, vomiting, constipation.  No diarrhea.  No bright red blood per rectum.   :  No urgency, frequency.  No recurrent urinary tract infection.  No kidney problems.   RHEUMATOLOGY/MUSCULOSKELETAL SYSTEM: + OA. No pain.    ENDOCRINE:  No history of diabetes, + hypor thyroid. No complaints of hot flashes.   HEMATOLOGY:   Non-provoked bilateral PE September 2019 at age 43.    Oncology: no hx of cancers  IMMUNOLOGY:  No recurrent fever or chills episode. No recurrent infectious episode  PSYCHIATRY:  Schizoaffective disorder, schizophrenia         PHYSICAL EXAMINATION ATTAINABLE DURING VIDEO VISIT:  CONSTITUTIONAL - Pt looks like stated age, pleasant, not in acute distress. Not obese.  NEURO: Oriented to time, person, and places. No tremor. Normal gait.   ENT, MOUTH: Pupils are equal.  Sclerae are anicteric.  Moist oral mucosa. No oral thrush.   NECK:  No jugular venous distention.  No thyroid enlargement.   RESPIRATORY: talk nl, no sob during conversation, no cough.   MUSCULOSKELETAL/EXTREMITIES:  No edema.  No joint deformity. Normal range of motion.  SKIN:  No petechiae.  No rash.  No signs of cellulitis.   PSYCHIATRIC: Normal mood and affect. Good memory. Proper insight and judgement.   THE REST OF A COMPREHENSIVE PHYSICIAL EXAM IS DEFERRED DUE TO COVID-19 PUBLIC HEALTH EMERGENCY RELATED VIDEO VISIT RESCTRICTION.        CURRENT LAB DATA REVIEWED TODAY WITH PATIENT during visit:  INR 2-3 in 6/2020        CURRENT IMAGING REVIEWED TODAY WITH PATIENT during visit:  12/2019 CT - PE resolved.        OLD DATA REVIEWED TODAY WITH SUMMARY  CT 9/2019 CT - Bilateral subsegmental pulmonary emboli.  US 9/2019 - no DVT      ASSESSMENT AND PLAN DISCUSSED WITH PATIENT TODAY:   Non-provoked bilateral segmental PE September 2019 at age 43.  He  "does not have strong family history.    He has schizophrenia, is anxious to know how long he needs to be on blood thinner.    I explained to him the duration of anticoagulation depends on a lot of factors in terms of the nature of the clot, genetic predisposition, comorbidities, etc.    Recommend him to consider of hemophilia work-up.  Due to interaction with Coumadin on some of the work-up test, advised him to hold Coumadin for 4 to 5 days, then proceed with the blood work, then resume Coumadin.    Final hematologic recommendation will be based on the above further work-up.    Virtual VISIT time is 50 minutes, spent in dicussion and review patient's PE history, labs and images results, further treatment recommendations, follow up plan.           Yusef Alvares is a 44 year old male who is being evaluated via a billable video visit.      The patient has been notified of following:     \"This video visit will be conducted via a call between you and your physician/provider. We have found that certain health care needs can be provided without the need for an in-person physical exam.  This service lets us provide the care you need with a video conversation.  If a prescription is necessary we can send it directly to your pharmacy.  If lab work is needed we can place an order for that and you can then stop by our lab to have the test done at a later time.    Video visits are billed at different rates depending on your insurance coverage.  Please reach out to your insurance provider with any questions.    If during the course of the call the physician/provider feels a video visit is not appropriate, you will not be charged for this service.\"    Patient has given verbal consent for Video visit? Yes    [email or web address]   [phone]    Will anyone else be joining your video visit? No        Crystal Canela CMA      Again, thank you for allowing me to participate in the care of your patient.        Sincerely,        Eugenie Marin " MD Demetrius, MD

## 2020-06-18 NOTE — LETTER
6/18/2020         RE: Yusef Alvares  [address] [place] [address]        Dear Colleague,    Thank you for referring your patient, Yusef Alvares, to the [facility] [place] [facility]. Please see a copy of my visit note below.    Type of Visit: Video Visit  Patient has given verbal consent for Video visit.     Video Start Time: 12:50 pm  Video End Time: 1:40 pm    Originating Location (pt. Location): Home     Distant Location (provider location):  [facility]      Platform used for Video Visit: [facility]       NEW PATIENT HEMATOLOGY CONSULT      REQUESTING PROVIDER/SERVICE: Dr. Kosta Katz, [facility]    PATIENT NAME: Yusef Alvares   MRN# [id number]     Date of Visit: Jun 18, 2020     YOB: 1975       REASON FOR CONSULTATION/CC:    9/2019 Bilateral subsegmental pulmonary emboli none provoked      HISTORY OF HEMATOLLOGY ILLNESS:    He presented at age 43 in September 2019 with sudden onset of moderate constant chest pain on both sides, with low-grade temperature, short of breath, small amount of hemoptysis.    Work-up identified bilateral segmental pulmonary embolism.  He was admitted for anticoagulation eventually transitioned into oral Coumadin.    Repeat CT chest December 2019 identified resolved PE.    He has no bleeding complications from Coumadin use.  His INR has been within therapeutic range.    He is seen hematology first time today for consultation regarding the duration of his anticoagulation.      PAST MEDICAL HISTORY  hyperlipidemia  ABDULKADIR  Hypo T  LBP with right side siatica  Schizoaffective disorder, schizophrenia    MEDICATIONS/ALLERY:  Reviewed in Epic system.        SOCIAL HISTORY:    Lives by himself not working.     FAMILY HISTORY:    Cousin had blood clot at young age      REVIEW OF SYSTEMS:   A 10-point review of systems was performed. Pertinent findings are noted in the HPI.    GENERAL: pt is in usual state of health.  No B symptoms  NEURO:   No headache, double vision, or focal  weakness.  No neuropathy.   SKIN:  No chronic skin rash or skin infection.   CARDIOVASCULAR:  No High blood pressure or hyperlipidemia. NO OLVERA  PULMONARY: see HPI  GI:  No abdominal pain, nausea, vomiting, constipation.  No diarrhea.  No bright red blood per rectum.   :  No urgency, frequency.  No recurrent urinary tract infection.  No kidney problems.   RHEUMATOLOGY/MUSCULOSKELETAL SYSTEM: + OA. No pain.    ENDOCRINE:  No history of diabetes, + hypor thyroid. No complaints of hot flashes.   HEMATOLOGY:   Non-provoked bilateral PE September 2019 at age 43.    Oncology: no hx of cancers  IMMUNOLOGY:  No recurrent fever or chills episode. No recurrent infectious episode  PSYCHIATRY:  Schizoaffective disorder, schizophrenia         PHYSICAL EXAMINATION ATTAINABLE DURING VIDEO VISIT:  CONSTITUTIONAL - Pt looks like stated age, pleasant, not in acute distress. Not obese.  NEURO: Oriented to time, person, and places. No tremor. Normal gait.   ENT, MOUTH: Pupils are equal.  Sclerae are anicteric.  Moist oral mucosa. No oral thrush.   NECK:  No jugular venous distention.  No thyroid enlargement.   RESPIRATORY: talk nl, no sob during conversation, no cough.   MUSCULOSKELETAL/EXTREMITIES:  No edema.  No joint deformity. Normal range of motion.  SKIN:  No petechiae.  No rash.  No signs of cellulitis.   PSYCHIATRIC: Normal mood and affect. Good memory. Proper insight and judgement.   THE REST OF A COMPREHENSIVE PHYSICIAL EXAM IS DEFERRED DUE TO COVID-19 PUBLIC HEALTH EMERGENCY RELATED VIDEO VISIT RESCTRICTION.        CURRENT LAB DATA REVIEWED TODAY WITH PATIENT during visit:  INR 2-3 in 6/2020        CURRENT IMAGING REVIEWED TODAY WITH PATIENT during visit:  12/2019 CT - PE resolved.        OLD DATA REVIEWED TODAY WITH SUMMARY  CT 9/2019 CT - Bilateral subsegmental pulmonary emboli.  US 9/2019 - no DVT      ASSESSMENT AND PLAN DISCUSSED WITH PATIENT TODAY:   Non-provoked bilateral segmental PE September 2019 at age 43.  He  "does not have strong family history.    He has schizophrenia, is anxious to know how long he needs to be on blood thinner.    I explained to him the duration of anticoagulation depends on a lot of factors in terms of the nature of the clot, genetic predisposition, comorbidities, etc.    Recommend him to consider of hemophilia work-up.  Due to interaction with Coumadin on some of the work-up test, advised him to hold Coumadin for 4 to 5 days, then proceed with the blood work, then resume Coumadin.    Final hematologic recommendation will be based on the above further work-up.    Virtual VISIT time is 50 minutes, spent in dicussion and review patient's PE history, labs and images results, further treatment recommendations, follow up plan.           Yusef Alvares is a 44 year old male who is being evaluated via a billable video visit.      The patient has been notified of following:     \"This video visit will be conducted via a call between you and your physician/provider. We have found that certain health care needs can be provided without the need for an in-person physical exam.  This service lets us provide the care you need with a video conversation.  If a prescription is necessary we can send it directly to your pharmacy.  If lab work is needed we can place an order for that and you can then stop by our lab to have the test done at a later time.    Video visits are billed at different rates depending on your insurance coverage.  Please reach out to your insurance provider with any questions.    If during the course of the call the physician/provider feels a video visit is not appropriate, you will not be charged for this service.\"    Patient has given verbal consent for Video visit? Yes    [email or web address]   [phone]    Will anyone else be joining your video visit? No        Crystal Canela CMA      Again, thank you for allowing me to participate in the care of your patient.        Sincerely,        Eugenie Marin " MD Demetrius, MD

## 2020-06-18 NOTE — PATIENT INSTRUCTIONS
Hold coumadin for 4-5 days, labs to be done after, then resume coumadin.     Follow-up in 2 wks with video visit.

## 2020-06-24 ENCOUNTER — HOSPITAL ENCOUNTER (OUTPATIENT)
Dept: LAB | Facility: CLINIC | Age: 45
Discharge: HOME | End: 2020-06-24
Attending: INTERNAL MEDICINE | Admitting: INTERNAL MEDICINE
Payer: COMMERCIAL

## 2020-06-24 DIAGNOSIS — I26.99 PULMONARY EMBOLISM, OTHER, UNSPECIFIED CHRONICITY, UNSPECIFIED WHETHER ACUTE COR PULMONALE PRESENT (H): ICD-10-CM

## 2020-06-24 LAB
ALBUMIN SERPL-MCNC: 3.7 G/DL (ref 3.4–5)
ALP SERPL-CCNC: 48 U/L (ref 40–150)
ALT SERPL W P-5'-P-CCNC: 27 U/L (ref 0–70)
ANION GAP SERPL CALCULATED.3IONS-SCNC: 1 MMOL/L (ref 3–14)
AST SERPL W P-5'-P-CCNC: 16 U/L (ref 0–45)
BASOPHILS # BLD AUTO: 0.1 10E9/L (ref 0–0.2)
BASOPHILS NFR BLD AUTO: 1.1 %
BILIRUB SERPL-MCNC: 1.6 MG/DL (ref 0.2–1.3)
BUN SERPL-MCNC: 18 MG/DL (ref 7–30)
CALCIUM SERPL-MCNC: 9 MG/DL (ref 8.5–10.1)
CHLORIDE SERPL-SCNC: 104 MMOL/L (ref 94–109)
CO2 SERPL-SCNC: 34 MMOL/L (ref 20–32)
CREAT SERPL-MCNC: 0.92 MG/DL (ref 0.66–1.25)
DIFFERENTIAL METHOD BLD: NORMAL
EOSINOPHIL # BLD AUTO: 0.2 10E9/L (ref 0–0.7)
EOSINOPHIL NFR BLD AUTO: 3.1 %
ERYTHROCYTE [DISTWIDTH] IN BLOOD BY AUTOMATED COUNT: 11.9 % (ref 10–15)
GFR SERPL CREATININE-BSD FRML MDRD: >90 ML/MIN/{1.73_M2}
GLUCOSE SERPL-MCNC: 105 MG/DL (ref 70–99)
HCT VFR BLD AUTO: 48.8 % (ref 40–53)
HGB BLD-MCNC: 16.6 G/DL (ref 13.3–17.7)
IMM GRANULOCYTES # BLD: 0 10E9/L (ref 0–0.4)
IMM GRANULOCYTES NFR BLD: 0.1 %
LYMPHOCYTES # BLD AUTO: 1.9 10E9/L (ref 0.8–5.3)
LYMPHOCYTES NFR BLD AUTO: 25.6 %
MCH RBC QN AUTO: 30 PG (ref 26.5–33)
MCHC RBC AUTO-ENTMCNC: 34 G/DL (ref 31.5–36.5)
MCV RBC AUTO: 88 FL (ref 78–100)
MONOCYTES # BLD AUTO: 0.7 10E9/L (ref 0–1.3)
MONOCYTES NFR BLD AUTO: 9.8 %
NEUTROPHILS # BLD AUTO: 4.4 10E9/L (ref 1.6–8.3)
NEUTROPHILS NFR BLD AUTO: 60.3 %
NRBC # BLD AUTO: 0 10*3/UL
NRBC BLD AUTO-RTO: 0 /100
PLATELET # BLD AUTO: 223 10E9/L (ref 150–450)
POTASSIUM SERPL-SCNC: 4 MMOL/L (ref 3.4–5.3)
PROT SERPL-MCNC: 7.2 G/DL (ref 6.8–8.8)
RBC # BLD AUTO: 5.54 10E12/L (ref 4.4–5.9)
SODIUM SERPL-SCNC: 139 MMOL/L (ref 133–144)
WBC # BLD AUTO: 7.3 10E9/L (ref 4–11)

## 2020-06-24 PROCEDURE — 86147 CARDIOLIPIN ANTIBODY EA IG: CPT | Performed by: INTERNAL MEDICINE

## 2020-06-24 PROCEDURE — 85613 RUSSELL VIPER VENOM DILUTED: CPT | Performed by: INTERNAL MEDICINE

## 2020-06-24 PROCEDURE — 86146 BETA-2 GLYCOPROTEIN ANTIBODY: CPT | Performed by: INTERNAL MEDICINE

## 2020-06-24 PROCEDURE — 81241 F5 GENE: CPT | Performed by: INTERNAL MEDICINE

## 2020-06-24 PROCEDURE — 80053 COMPREHEN METABOLIC PANEL: CPT | Performed by: INTERNAL MEDICINE

## 2020-06-24 PROCEDURE — 00000401 ZZHCL STATISTIC THROMBIN TIME NC: Performed by: INTERNAL MEDICINE

## 2020-06-24 PROCEDURE — 85025 COMPLETE CBC W/AUTO DIFF WBC: CPT | Performed by: INTERNAL MEDICINE

## 2020-06-24 PROCEDURE — 85306 CLOT INHIBIT PROT S FREE: CPT | Performed by: INTERNAL MEDICINE

## 2020-06-24 PROCEDURE — 85303 CLOT INHIBIT PROT C ACTIVITY: CPT | Performed by: INTERNAL MEDICINE

## 2020-06-24 PROCEDURE — 81240 F2 GENE: CPT | Performed by: INTERNAL MEDICINE

## 2020-06-24 PROCEDURE — 85300 ANTITHROMBIN III ACTIVITY: CPT | Performed by: INTERNAL MEDICINE

## 2020-06-24 PROCEDURE — 85730 THROMBOPLASTIN TIME PARTIAL: CPT | Performed by: INTERNAL MEDICINE

## 2020-06-24 PROCEDURE — 36415 COLL VENOUS BLD VENIPUNCTURE: CPT | Performed by: INTERNAL MEDICINE

## 2020-06-24 PROCEDURE — 00000167 ZZHCL STATISTIC INR NC: Performed by: INTERNAL MEDICINE

## 2020-06-25 LAB
AT III ACT/NOR PPP CHRO: 95 % (ref 85–135)
B2 GLYCOPROT1 IGG SERPL IA-ACNC: <0.6 U/ML
B2 GLYCOPROT1 IGM SERPL IA-ACNC: <2.9 U/ML
CARDIOLIPIN ANTIBODY IGG: <1.6 GPL-U/ML (ref 0–19.9)
CARDIOLIPIN ANTIBODY IGM: <0.2 MPL-U/ML (ref 0–19.9)

## 2020-06-26 LAB
LA PPP-IMP: NEGATIVE
PROT C ACT/NOR PPP CHRO: 109 % (ref 70–170)
PROT S FREE AG ACT/NOR PPP IA: 52 % (ref 70–148)

## 2020-06-29 LAB — COPATH REPORT: NORMAL

## 2020-07-05 NOTE — PROGRESS NOTES
Type of Visit: Video Visit  Patient has given verbal consent for Video visit.     Video Start Time: 9:08 am  Video End Time:  9: 35 am    Originating Location (pt. Location): Home     Distant Location (provider location):  [facility]      Platform used for Video Visit: [facility]       HEMATOLOGY FOLLOW UP VISIT      REQUESTING PROVIDER/SERVICE: QUINCY Tee    PATIENT NAME: Yusef Alvares   MRN# [id number]     Date of Visit: Jul 9, 2020     YOB: 1975       REASON FOR VISIT/CC:    9/2019 Bilateral subsegmental pulmonary emboli none provoked      HISTORY OF HEMATOLLOGY ILLNESS:    He presented at age 43 in September 2019 with sudden onset of moderate constant chest pain on both sides, with low-grade temperature, short of breath, small amount of hemoptysis.    Work-up identified bilateral segmental pulmonary embolism.  He was admitted for anticoagulation eventually transitioned into oral Coumadin.    Repeat CT chest December 2019 identified resolved PE.    He has no bleeding complications from Coumadin use.  His INR has been within therapeutic range.      INTERVAL HISTORY:  He saw us for consult mid June 2020 regarding the duration of his anticoagulation.  ProC is low at 52% (), other hemophilia work up were negative.   He is advised on long term anticoagulation.     PAST MEDICAL HISTORY  hyperlipidemia  ABDULKADIR  Hypo T  LBP with right side siatica  Schizoaffective disorder, schizophrenia    MEDICATIONS/ALLERY:  Reviewed in Epic system.        SOCIAL HISTORY:    Lives by himself not working.     FAMILY HISTORY:    Cousin had blood clot at young age      REVIEW OF SYSTEMS:   A 10-point review of systems was performed. Pertinent findings are noted in the HPI.    GENERAL: pt is in usual state of health.  No B symptoms  NEURO:   No headache, double vision, or focal weakness.  No neuropathy.   SKIN:  No chronic skin rash or skin infection.   CARDIOVASCULAR:  No High blood pressure or  hyperlipidemia. NO OLVERA  PULMONARY: see HPI  GI:  No abdominal pain, nausea, vomiting, constipation.  No diarrhea.  No bright red blood per rectum.   :  No urgency, frequency.  No recurrent urinary tract infection.  No kidney problems.   RHEUMATOLOGY/MUSCULOSKELETAL SYSTEM: + OA. No pain.    ENDOCRINE:  No history of diabetes, + hypor thyroid. No complaints of hot flashes.   HEMATOLOGY:   Non-provoked bilateral PE September 2019 at age 43.    Oncology: no hx of cancers  IMMUNOLOGY:  No recurrent fever or chills episode. No recurrent infectious episode  PSYCHIATRY:  Schizoaffective disorder, schizophrenia         PHYSICAL EXAMINATION ATTAINABLE DURING VIDEO VISIT:  CONSTITUTIONAL - Pt looks like stated age, pleasant, not in acute distress. Not obese.  NEURO: Oriented to time, person, and places. No tremor. Normal gait.   ENT, MOUTH: Pupils are equal.  Sclerae are anicteric.  Moist oral mucosa. No oral thrush.   NECK:  No jugular venous distention.  No thyroid enlargement.   RESPIRATORY: talk nl, no sob during conversation, no cough.   MUSCULOSKELETAL/EXTREMITIES:  No edema.  No joint deformity. Normal range of motion.  SKIN:  No petechiae.  No rash.  No signs of cellulitis.   PSYCHIATRIC: Normal mood and affect. Good memory. Proper insight and judgement.   THE REST OF A COMPREHENSIVE PHYSICIAL EXAM IS DEFERRED DUE TO COVID-19 PUBLIC HEALTH EMERGENCY RELATED VIDEO VISIT RESCTRICTION.        CURRENT LAB DATA REVIEWED TODAY WITH PATIENT during visit:  Cbc diff/cmp are fine.   INR 2-3 in 6/2020  ProC is low at 52% (), other hemophilia work up were negative.         CURRENT IMAGING REVIEWED TODAY WITH PATIENT during visit:  12/2019 CT - PE resolved.        OLD DATA REVIEWED TODAY WITH SUMMARY  CT 9/2019 CT - Bilateral subsegmental pulmonary emboli.  US 9/2019 - no DVT      ASSESSMENT AND PLAN DISCUSSED WITH PATIENT TODAY:   Non-provoked bilateral segmental PE September 2019 at age 43.  He does not have strong  family history.    He has schizophrenia, is anxious to know how long he needs to be on blood thinner.    He saw us for consult mid June 2020 regarding the duration of his anticoagulation.  Asked him how long did he hold coumadin before he did the blood test.   ProC is low at 52% (), other hemophilia work up were negative.     He is advised on long term anti coagulation.     We discussed the options of anti coagulation, difference b/e DOAC and coumadin.    He is not willing to switch to DOAC, feels that he is able to afford coumadin, no side effects so far.    He did not have coumadin therapy initiation phase side effects e.g. skin necrosis due to short half life of ProC , and has been on coumadin therapy for about a year now, with well suppressed factor II, VII, IX, X, it is reasonable to respect his preference of staying on coumadin for now.       Virtual VISIT time is 25 minutes, spent in dicussion and review patient's PE history, labs and images results, anti coagulation tx options discussion, further treatment recommendations.

## 2020-07-09 ENCOUNTER — VIRTUAL VISIT (OUTPATIENT)
Dept: ONCOLOGY | Facility: CLINIC | Age: 45
End: 2020-07-09
Attending: INTERNAL MEDICINE
Payer: COMMERCIAL

## 2020-07-09 DIAGNOSIS — Z79.01 LONG TERM CURRENT USE OF ANTICOAGULANT THERAPY: ICD-10-CM

## 2020-07-09 DIAGNOSIS — I26.99 PULMONARY EMBOLISM, OTHER, UNSPECIFIED CHRONICITY, UNSPECIFIED WHETHER ACUTE COR PULMONALE PRESENT (H): Primary | ICD-10-CM

## 2020-07-09 PROCEDURE — 40001009 ZZH VIDEO/TELEPHONE VISIT; NO CHARGE

## 2020-07-09 PROCEDURE — 99214 OFFICE O/P EST MOD 30 MIN: CPT | Mod: 95 | Performed by: INTERNAL MEDICINE

## 2020-07-09 NOTE — LETTER
"    7/9/2020         RE: Yusef Alvares  [address] [facility] [place] [address]        Dear Colleague,    Thank you for referring your patient, Yusef Alvares, to the [facility]. Please see a copy of my visit note below.    Yusef Alvares is a 44 year old male who is being evaluated via a billable video visit.      The patient has been notified of following:     \"This video visit will be conducted via a call between you and your physician/provider. We have found that certain health care needs can be provided without the need for an in-person physical exam.  This service lets us provide the care you need with a video conversation.  If a prescription is necessary we can send it directly to your pharmacy.  If lab work is needed we can place an order for that and you can then stop by our lab to have the test done at a later time.    Video visits are billed at different rates depending on your insurance coverage.  Please reach out to your insurance provider with any questions.    If during the course of the call the physician/provider feels a video visit is not appropriate, you will not be charged for this service.\"    Patient has given verbal consent for Video visit? Yes  How would you like to obtain your AVS? MyChart  Patient would like the video invitation sent by: Send to e-mail at: [email or web address]"   [phone]  Will anyone else be joining your video visit? No        Video-Visit Details    Type of service:  Video Visit      Originating Location (pt. Location): Home    Distant Location (provider location):  [facility]         Crystal Canela CMA            Type of Visit: Video Visit  Patient has given verbal consent for Video visit.     Video Start Time: 9:08 am  Video End Time:  9: 35 am    Originating Location (pt. Location): Home     Distant Location (provider location):  [facility]      Platform used for Video Visit: [facility]       HEMATOLOGY FOLLOW UP VISIT      REQUESTING " PROVIDER/SERVICE: Dr. Kosta Katz, PMRAFAEL    PATIENT NAME: Yusef Alvares   MRN# [id number]     Date of Visit: Jul 9, 2020     YOB: 1975       REASON FOR VISIT/CC:    9/2019 Bilateral subsegmental pulmonary emboli none provoked      HISTORY OF HEMATOLLOGY ILLNESS:    He presented at age 43 in September 2019 with sudden onset of moderate constant chest pain on both sides, with low-grade temperature, short of breath, small amount of hemoptysis.    Work-up identified bilateral segmental pulmonary embolism.  He was admitted for anticoagulation eventually transitioned into oral Coumadin.    Repeat CT chest December 2019 identified resolved PE.    He has no bleeding complications from Coumadin use.  His INR has been within therapeutic range.      INTERVAL HISTORY:  He saw us for consult mid June 2020 regarding the duration of his anticoagulation.  ProC is low at 52% (), other hemophilia work up were negative.   He is advised on long term anticoagulation.     PAST MEDICAL HISTORY  hyperlipidemia  ABDULKADIR  Hypo T  LBP with right side siatica  Schizoaffective disorder, schizophrenia    MEDICATIONS/ALLERY:  Reviewed in Epic system.        SOCIAL HISTORY:    Lives by himself not working.     FAMILY HISTORY:    Cousin had blood clot at young age      REVIEW OF SYSTEMS:   A 10-point review of systems was performed. Pertinent findings are noted in the HPI.    GENERAL: pt is in usual state of health.  No B symptoms  NEURO:   No headache, double vision, or focal weakness.  No neuropathy.   SKIN:  No chronic skin rash or skin infection.   CARDIOVASCULAR:  No High blood pressure or hyperlipidemia. NO OLVERA  PULMONARY: see HPI  GI:  No abdominal pain, nausea, vomiting, constipation.  No diarrhea.  No bright red blood per rectum.   :  No urgency, frequency.  No recurrent urinary tract infection.  No kidney problems.   RHEUMATOLOGY/MUSCULOSKELETAL SYSTEM: + OA. No pain.    ENDOCRINE:  No history of diabetes, + hypor  thyroid. No complaints of hot flashes.   HEMATOLOGY:   Non-provoked bilateral PE September 2019 at age 43.    Oncology: no hx of cancers  IMMUNOLOGY:  No recurrent fever or chills episode. No recurrent infectious episode  PSYCHIATRY:  Schizoaffective disorder, schizophrenia         PHYSICAL EXAMINATION ATTAINABLE DURING VIDEO VISIT:  CONSTITUTIONAL - Pt looks like stated age, pleasant, not in acute distress. Not obese.  NEURO: Oriented to time, person, and places. No tremor. Normal gait.   ENT, MOUTH: Pupils are equal.  Sclerae are anicteric.  Moist oral mucosa. No oral thrush.   NECK:  No jugular venous distention.  No thyroid enlargement.   RESPIRATORY: talk nl, no sob during conversation, no cough.   MUSCULOSKELETAL/EXTREMITIES:  No edema.  No joint deformity. Normal range of motion.  SKIN:  No petechiae.  No rash.  No signs of cellulitis.   PSYCHIATRIC: Normal mood and affect. Good memory. Proper insight and judgement.   THE REST OF A COMPREHENSIVE PHYSICIAL EXAM IS DEFERRED DUE TO COVID-19 PUBLIC HEALTH EMERGENCY RELATED VIDEO VISIT RESCTRICTION.        CURRENT LAB DATA REVIEWED TODAY WITH PATIENT during visit:  Cbc diff/cmp are fine.   INR 2-3 in 6/2020  ProC is low at 52% (), other hemophilia work up were negative.         CURRENT IMAGING REVIEWED TODAY WITH PATIENT during visit:  12/2019 CT - PE resolved.        OLD DATA REVIEWED TODAY WITH SUMMARY  CT 9/2019 CT - Bilateral subsegmental pulmonary emboli.  US 9/2019 - no DVT      ASSESSMENT AND PLAN DISCUSSED WITH PATIENT TODAY:   Non-provoked bilateral segmental PE September 2019 at age 43.  He does not have strong family history.    He has schizophrenia, is anxious to know how long he needs to be on blood thinner.    He saw us for consult mid June 2020 regarding the duration of his anticoagulation.  Asked him how long did he hold coumadin before he did the blood test.   ProC is low at 52% (), other hemophilia work up were negative.     He is  advised on long term anti coagulation.     We discussed the options of anti coagulation, difference b/e DOAC and coumadin.    He is not willing to switch to DOAC, feels that he is able to afford coumadin, no side effects so far.    He did not have coumadin therapy initiation phase side effects e.g. skin necrosis due to short half life of ProC , and has been on coumadin therapy for about a year now, with well suppressed factor II, VII, IX, X, it is reasonable to respect his preference of staying on coumadin for now.       Virtual VISIT time is 25 minutes, spent in dicussion and review patient's PE history, labs and images results, anti coagulation tx options discussion, further treatment recommendations.           Again, thank you for allowing me to participate in the care of your patient.        Sincerely,        Eugenie Romo MD, MD

## 2020-07-09 NOTE — PROGRESS NOTES
"Yusef Alvares is a 44 year old male who is being evaluated via a billable video visit.      The patient has been notified of following:     \"This video visit will be conducted via a call between you and your physician/provider. We have found that certain health care needs can be provided without the need for an in-person physical exam.  This service lets us provide the care you need with a video conversation.  If a prescription is necessary we can send it directly to your pharmacy.  If lab work is needed we can place an order for that and you can then stop by our lab to have the test done at a later time.    Video visits are billed at different rates depending on your insurance coverage.  Please reach out to your insurance provider with any questions.    If during the course of the call the physician/provider feels a video visit is not appropriate, you will not be charged for this service.\"    Patient has given verbal consent for Video visit? Yes  How would you like to obtain your AVS? Bonnie  Patient would like the video invitation sent by: Send to e-mail at: [email or web address]   [phone]  Will anyone else be joining your video visit? No        Video-Visit Details    Type of service:  Video Visit      Originating Location (pt. Location): Home    Distant Location (provider location):  [facility]         Crystal Canela CMA          "

## 2020-07-09 NOTE — LETTER
"    7/9/2020         RE: Yusef Alvares  [address] [facility] [place] [address]        Dear Colleague,    Thank you for referring your patient, Yusef Alvares, to the [facility]. Please see a copy of my visit note below.    Yusef Alvares is a 44 year old male who is being evaluated via a billable video visit.      The patient has been notified of following:     \"This video visit will be conducted via a call between you and your physician/provider. We have found that certain health care needs can be provided without the need for an in-person physical exam.  This service lets us provide the care you need with a video conversation.  If a prescription is necessary we can send it directly to your pharmacy.  If lab work is needed we can place an order for that and you can then stop by our lab to have the test done at a later time.    Video visits are billed at different rates depending on your insurance coverage.  Please reach out to your insurance provider with any questions.    If during the course of the call the physician/provider feels a video visit is not appropriate, you will not be charged for this service.\"    Patient has given verbal consent for Video visit? Yes  How would you like to obtain your AVS? MyChart  Patient would like the video invitation sent by: Send to e-mail at: [email or web address]   [phone]  Will anyone else be joining your video visit? No        Video-Visit Details    Type of service:  Video Visit      Originating Location (pt. Location): Home    Distant Location (provider location):  [facility]         Crystal Canela CMA            Type of Visit: Video Visit  Patient has given verbal consent for Video visit.     Video Start Time: 9:08 am  Video End Time:  9: 35 am    Originating Location (pt. Location): Home     Distant Location (provider location):  [facility]      Platform used for Video Visit: [facility]       HEMATOLOGY FOLLOW UP VISIT      REQUESTING " PROVIDER/SERVICE: Dr. Kosta Katz, PMRAFAEL    PATIENT NAME: Yusef Alvares   MRN# [id number]     Date of Visit: Jul 9, 2020     YOB: 1975       REASON FOR VISIT/CC:    9/2019 Bilateral subsegmental pulmonary emboli none provoked      HISTORY OF HEMATOLLOGY ILLNESS:    He presented at age 43 in September 2019 with sudden onset of moderate constant chest pain on both sides, with low-grade temperature, short of breath, small amount of hemoptysis.    Work-up identified bilateral segmental pulmonary embolism.  He was admitted for anticoagulation eventually transitioned into oral Coumadin.    Repeat CT chest December 2019 identified resolved PE.    He has no bleeding complications from Coumadin use.  His INR has been within therapeutic range.      INTERVAL HISTORY:  He saw us for consult mid June 2020 regarding the duration of his anticoagulation.  ProC is low at 52% (), other hemophilia work up were negative.   He is advised on long term anticoagulation.     PAST MEDICAL HISTORY  hyperlipidemia  ABDULKADIR  Hypo T  LBP with right side siatica  Schizoaffective disorder, schizophrenia    MEDICATIONS/ALLERY:  Reviewed in Epic system.        SOCIAL HISTORY:    Lives by himself not working.     FAMILY HISTORY:    Cousin had blood clot at young age      REVIEW OF SYSTEMS:   A 10-point review of systems was performed. Pertinent findings are noted in the HPI.    GENERAL: pt is in usual state of health.  No B symptoms  NEURO:   No headache, double vision, or focal weakness.  No neuropathy.   SKIN:  No chronic skin rash or skin infection.   CARDIOVASCULAR:  No High blood pressure or hyperlipidemia. NO OLVERA  PULMONARY: see HPI  GI:  No abdominal pain, nausea, vomiting, constipation.  No diarrhea.  No bright red blood per rectum.   :  No urgency, frequency.  No recurrent urinary tract infection.  No kidney problems.   RHEUMATOLOGY/MUSCULOSKELETAL SYSTEM: + OA. No pain.    ENDOCRINE:  No history of diabetes, + hypor  thyroid. No complaints of hot flashes.   HEMATOLOGY:   Non-provoked bilateral PE September 2019 at age 43.    Oncology: no hx of cancers  IMMUNOLOGY:  No recurrent fever or chills episode. No recurrent infectious episode  PSYCHIATRY:  Schizoaffective disorder, schizophrenia         PHYSICAL EXAMINATION ATTAINABLE DURING VIDEO VISIT:  CONSTITUTIONAL - Pt looks like stated age, pleasant, not in acute distress. Not obese.  NEURO: Oriented to time, person, and places. No tremor. Normal gait.   ENT, MOUTH: Pupils are equal.  Sclerae are anicteric.  Moist oral mucosa. No oral thrush.   NECK:  No jugular venous distention.  No thyroid enlargement.   RESPIRATORY: talk nl, no sob during conversation, no cough.   MUSCULOSKELETAL/EXTREMITIES:  No edema.  No joint deformity. Normal range of motion.  SKIN:  No petechiae.  No rash.  No signs of cellulitis.   PSYCHIATRIC: Normal mood and affect. Good memory. Proper insight and judgement.   THE REST OF A COMPREHENSIVE PHYSICIAL EXAM IS DEFERRED DUE TO COVID-19 PUBLIC HEALTH EMERGENCY RELATED VIDEO VISIT RESCTRICTION.        CURRENT LAB DATA REVIEWED TODAY WITH PATIENT during visit:  Cbc diff/cmp are fine.   INR 2-3 in 6/2020  ProC is low at 52% (), other hemophilia work up were negative.         CURRENT IMAGING REVIEWED TODAY WITH PATIENT during visit:  12/2019 CT - PE resolved.        OLD DATA REVIEWED TODAY WITH SUMMARY  CT 9/2019 CT - Bilateral subsegmental pulmonary emboli.  US 9/2019 - no DVT      ASSESSMENT AND PLAN DISCUSSED WITH PATIENT TODAY:   Non-provoked bilateral segmental PE September 2019 at age 43.  He does not have strong family history.    He has schizophrenia, is anxious to know how long he needs to be on blood thinner.    He saw us for consult mid June 2020 regarding the duration of his anticoagulation.  Asked him how long did he hold coumadin before he did the blood test.   ProC is low at 52% (), other hemophilia work up were negative.     He is  advised on long term anti coagulation.     We discussed the options of anti coagulation, difference b/e DOAC and coumadin.    He is not willing to switch to DOAC, feels that he is able to afford coumadin, no side effects so far.    He did not have coumadin therapy initiation phase side effects e.g. skin necrosis due to short half life of ProC , and has been on coumadin therapy for about a year now, with well suppressed factor II, VII, IX, X, it is reasonable to respect his preference of staying on coumadin for now.       Virtual VISIT time is 25 minutes, spent in dicussion and review patient's PE history, labs and images results, anti coagulation tx options discussion, further treatment recommendations.           Again, thank you for allowing me to participate in the care of your patient.        Sincerely,        Eugenie Romo MD, MD

## 2020-07-13 DIAGNOSIS — E78.5 HYPERLIPIDEMIA LDL GOAL <130: ICD-10-CM

## 2020-07-13 DIAGNOSIS — Z79.01 LONG TERM CURRENT USE OF ANTICOAGULANT THERAPY: ICD-10-CM

## 2020-07-13 DIAGNOSIS — E03.9 HYPOTHYROIDISM, UNSPECIFIED TYPE: ICD-10-CM

## 2020-07-13 DIAGNOSIS — L30.9 DERMATITIS: ICD-10-CM

## 2020-07-13 DIAGNOSIS — I26.99 ACUTE PULMONARY EMBOLISM WITHOUT ACUTE COR PULMONALE, UNSPECIFIED PULMONARY EMBOLISM TYPE (H): ICD-10-CM

## 2020-07-14 RX ORDER — LEVOTHYROXINE SODIUM 50 UG/1
TABLET ORAL
Qty: 90 TABLET | Refills: 2 | Status: SHIPPED | OUTPATIENT
Start: 2020-07-14 | End: 2021-04-01

## 2020-07-14 RX ORDER — SIMVASTATIN 20 MG
TABLET ORAL
Qty: 90 TABLET | Refills: 2 | Status: SHIPPED | OUTPATIENT
Start: 2020-07-14 | End: 2021-04-01

## 2020-07-14 RX ORDER — WARFARIN SODIUM 5 MG/1
TABLET ORAL
Qty: 120 TABLET | Refills: 0 | Status: SHIPPED | OUTPATIENT
Start: 2020-07-14 | End: 2020-11-02

## 2020-07-14 RX ORDER — SIMVASTATIN 40 MG
TABLET ORAL
Qty: 90 TABLET | Refills: 2 | Status: SHIPPED | OUTPATIENT
Start: 2020-07-14 | End: 2021-04-01

## 2020-07-14 RX ORDER — TRIAMCINOLONE ACETONIDE 1 MG/G
CREAM TOPICAL
Qty: 45 G | Refills: 2 | Status: SHIPPED | OUTPATIENT
Start: 2020-07-14 | End: 2020-10-16

## 2020-07-14 NOTE — TELEPHONE ENCOUNTER
"Requested Prescriptions   Pending Prescriptions Disp Refills     warfarin ANTICOAGULANT (COUMADIN) 5 MG tablet [Pharmacy Med Name: WARFARIN 5MG TAB] 41 tablet      Sig: TAKE 1 TABLET (5MG) BY MOUTH EVERY SUNDAY & THURSDAY AND TAKE 1 & 1/2 TABLETS (7.5MG) ALL OTHER DAYS OR       Vitamin K Antagonists Failed - 7/14/2020 11:21 AM        Failed - INR is within goal in the past 6 weeks     Confirm INR is within goal in the past 6 weeks.     Recent Labs   Lab Test 06/08/20  0922   INR 2.30*                       Passed - Recent (12 mo) or future (30 days) visit within the authorizing provider's specialty     Patient has had an office visit with the authorizing provider or a provider within the authorizing providers department within the previous 12 mos or has a future within next 30 days. See \"Patient Info\" tab in inbasket, or \"Choose Columns\" in Meds & Orders section of the refill encounter.              Passed - Medication is active on med list        Passed - Patient is 18 years of age or older           triamcinolone (KENALOG) 0.1 % external cream [Pharmacy Med Name: TRIAMCINOLONE 0.1% CRE] 45 g 3     Sig: APPLY TOPICALLY TWICE A DAY AS NEEDED ON HANDS & RIGHT LEG       Topical Steroids and Nonsteroidals Protocol Passed - 7/14/2020 11:21 AM        Passed - Patient is age 6 or older        Passed - Authorizing prescriber's most recent note related to this medication read.     If refill request is for ophthalmic use, please forward request to provider for approval.          Passed - High potency steroid not ordered        Passed - Recent (12 mo) or future (30 days) visit within the authorizing provider's specialty     Patient has had an office visit with the authorizing provider or a provider within the authorizing providers department within the previous 12 mos or has a future within next 30 days. See \"Patient Info\" tab in inbasket, or \"Choose Columns\" in Meds & Orders section of the refill encounter.              " Passed - Medication is active on med list

## 2020-07-14 NOTE — TELEPHONE ENCOUNTER
"Requested Prescriptions   Pending Prescriptions Disp Refills     simvastatin (ZOCOR) 40 MG tablet [Pharmacy Med Name: SIMVASTATIN 40MG TAB] 30 tablet      Sig: TAKE 1 TABLET BY MOUTH DAILY ALONG WITH 20MG TO EQUAL 60MG       Statins Protocol Passed - 7/13/2020  3:43 PM        Passed - LDL on file in past 12 months     Recent Labs   Lab Test 03/04/20  1214   *             Passed - No abnormal creatine kinase in past 12 months     No lab results found.             Passed - Recent (12 mo) or future (30 days) visit within the authorizing provider's specialty     Patient has had an office visit with the authorizing provider or a provider within the authorizing providers department within the previous 12 mos or has a future within next 30 days. See \"Patient Info\" tab in inbasket, or \"Choose Columns\" in Meds & Orders section of the refill encounter.              Passed - Medication is active on med list        Passed - Patient is age 18 or older           simvastatin (ZOCOR) 20 MG tablet [Pharmacy Med Name: SIMVASTATIN 20MG TAB] 30 tablet      Sig: TAKE 1 TABLET BY MOUTH AT BEDTIME ALONG WITH 40MG TO EQUAL 60MG       Statins Protocol Passed - 7/13/2020  3:43 PM        Passed - LDL on file in past 12 months     Recent Labs   Lab Test 03/04/20  1214   *             Passed - No abnormal creatine kinase in past 12 months     No lab results found.             Passed - Recent (12 mo) or future (30 days) visit within the authorizing provider's specialty     Patient has had an office visit with the authorizing provider or a provider within the authorizing providers department within the previous 12 mos or has a future within next 30 days. See \"Patient Info\" tab in inbasket, or \"Choose Columns\" in Meds & Orders section of the refill encounter.              Passed - Medication is active on med list        Passed - Patient is age 18 or older           levothyroxine (SYNTHROID/LEVOTHROID) 50 MCG tablet [Pharmacy Med " "Name: LEVOTHYROXIN 50MCG TAB] 30 tablet      Sig: TAKE 1 TABLET BY MOUTH DAILY       Thyroid Protocol Passed - 7/13/2020  3:43 PM        Passed - Patient is 12 years or older        Passed - Recent (12 mo) or future (30 days) visit within the authorizing provider's specialty     Patient has had an office visit with the authorizing provider or a provider within the authorizing providers department within the previous 12 mos or has a future within next 30 days. See \"Patient Info\" tab in inbasket, or \"Choose Columns\" in Meds & Orders section of the refill encounter.              Passed - Medication is active on med list        Passed - Normal TSH on file in past 12 months     Recent Labs   Lab Test 03/04/20  1214   TSH 0.63                   "

## 2020-07-14 NOTE — TELEPHONE ENCOUNTER
Current warfarin dose:  Warfarin maintenance plan:   5 mg (5 mg x 1) every Sun, Thu; 7.5 mg (5 mg x 1.5) all other days   Full warfarin instructions:   5 mg every Sun, Thu; 7.5 mg all other days   Weekly warfarin total:   47.5 mg     Last INR result:  INR   Date Value Ref Range Status   06/08/2020 2.30 (H) 0.86 - 1.14 Final     Comment:     This test is intended for monitoring Coumadin therapy.  Results are not   accurate in patients with prolonged INR due to factor deficiency.       Last office visit: 3/4/2020     Refill authorized per [facility] protocol.    Salvador PEREZ RN, CACP

## 2020-08-05 ENCOUNTER — ANTICOAGULATION THERAPY VISIT (OUTPATIENT)
Dept: ANTICOAGULATION | Facility: CLINIC | Age: 45
End: 2020-08-05

## 2020-08-05 DIAGNOSIS — I26.99 PULMONARY EMBOLISM, OTHER, UNSPECIFIED CHRONICITY, UNSPECIFIED WHETHER ACUTE COR PULMONALE PRESENT (H): ICD-10-CM

## 2020-08-05 DIAGNOSIS — Z79.01 LONG TERM CURRENT USE OF ANTICOAGULANT THERAPY: ICD-10-CM

## 2020-08-05 DIAGNOSIS — I26.99 PULMONARY EMBOLISM (H): ICD-10-CM

## 2020-08-05 LAB
CAPILLARY BLOOD COLLECTION: NORMAL
INR PPP: 3 (ref 0.86–1.14)

## 2020-08-05 PROCEDURE — 36416 COLLJ CAPILLARY BLOOD SPEC: CPT | Performed by: FAMILY MEDICINE

## 2020-08-05 PROCEDURE — 85610 PROTHROMBIN TIME: CPT | Performed by: FAMILY MEDICINE

## 2020-08-05 PROCEDURE — 99207 ZZC NO CHARGE NURSE ONLY: CPT

## 2020-08-05 NOTE — PROGRESS NOTES
ANTICOAGULATION FOLLOW-UP CLINIC VISIT    Patient Name:  Yusef Alvares  Date:  8/5/2020  Contact Type:  Telephone    SUBJECTIVE:  Patient Findings     Comments:   No changes in medications, activity, or diet noted. No concerns with clotting, bleeding, or increased bruising noted. Took warfarin as prescribed.  Patient is to continue maintenance warfarin plan, and check INR in 6 weeks.  Patient verbalizes understanding and agrees to plan. No further questions or concerns.        Clinical Outcomes     Negatives:   Major bleeding event, Thromboembolic event, Anticoagulation-related hospital admission, Anticoagulation-related ED visit, Anticoagulation-related fatality    Comments:   No changes in medications, activity, or diet noted. No concerns with clotting, bleeding, or increased bruising noted. Took warfarin as prescribed.  Patient is to continue maintenance warfarin plan, and check INR in 6 weeks.  Patient verbalizes understanding and agrees to plan. No further questions or concerns.           OBJECTIVE    Recent labs: (last 7 days)     08/05/20  0732   INR 3.00*       ASSESSMENT / PLAN  INR assessment THER    Recheck INR In: 6 WEEKS    INR Location Clinic      Anticoagulation Summary  As of 8/5/2020    INR goal:   2.0-3.0   TTR:   82.6 % (10.1 mo)   INR used for dosing:   3.00 (8/5/2020)   Warfarin maintenance plan:   5 mg (5 mg x 1) every Sun, Thu; 7.5 mg (5 mg x 1.5) all other days   Full warfarin instructions:   5 mg every Sun, Thu; 7.5 mg all other days   Weekly warfarin total:   47.5 mg   No change documented:   Thalia Sood RN   Plan last modified:   Sonja Stone RN (1/21/2020)   Next INR check:   9/16/2020   Priority:   Maintenance   Target end date:   3/27/2020    Indications    Pulmonary embolism (H) [I26.99]  Long term current use of anticoagulant therapy [Z79.01]             Anticoagulation Episode Summary     INR check location:       Preferred lab:       Send INR reminders to:   [facility]  [place]    Comments:   * 6 month therapy      Anticoagulation Care Providers     Provider Role Specialty Phone number    Yusef Katz MD [facility] [phone]            See the Encounter Report to view Anticoagulation Flowsheet and Dosing Calendar (Go to Encounters tab in chart review, and find the Anticoagulation Therapy Visit)        Thalia Sood RN

## 2020-08-11 ENCOUNTER — TRANSFERRED RECORDS (OUTPATIENT)
Dept: HEALTH INFORMATION MANAGEMENT | Facility: CLINIC | Age: 45
End: 2020-08-11

## 2020-09-16 ENCOUNTER — ANTICOAGULATION THERAPY VISIT (OUTPATIENT)
Dept: ANTICOAGULATION | Facility: CLINIC | Age: 45
End: 2020-09-16

## 2020-09-16 ENCOUNTER — TELEPHONE (OUTPATIENT)
Dept: FAMILY MEDICINE | Facility: CLINIC | Age: 45
End: 2020-09-16

## 2020-09-16 DIAGNOSIS — I26.99 PULMONARY EMBOLISM (H): ICD-10-CM

## 2020-09-16 DIAGNOSIS — Z79.01 LONG TERM CURRENT USE OF ANTICOAGULANT THERAPY: ICD-10-CM

## 2020-09-16 DIAGNOSIS — Z79.01 LONG TERM CURRENT USE OF ANTICOAGULANT THERAPY: Primary | ICD-10-CM

## 2020-09-16 LAB
CAPILLARY BLOOD COLLECTION: NORMAL
INR PPP: 2.4 (ref 0.86–1.14)

## 2020-09-16 PROCEDURE — 99207 ZZC NO CHARGE NURSE ONLY: CPT

## 2020-09-16 PROCEDURE — 85610 PROTHROMBIN TIME: CPT | Performed by: FAMILY MEDICINE

## 2020-09-16 PROCEDURE — 36416 COLLJ CAPILLARY BLOOD SPEC: CPT | Performed by: FAMILY MEDICINE

## 2020-09-16 NOTE — TELEPHONE ENCOUNTER
ANTICOAGULATION MANAGEMENT      Yusef Alvares due for annual renewal of referral to anticoagulation monitoring. Order pended for your review and signature.      ANTICOAGULATION SUMMARY      Warfarin indication(s)     PE    Heart valve present?  NO       Current goal range   INR: 2.0-3.0     Goal appropriate for indication? Yes, INR 2-3 appropriate for hx of DVT, PE, hypercoagulable state, Afib, LVAD, or bileaflet AVR without risk factors     Current duration of therapy Indefinite/long term therapy   Time in Therapeutic Range (TTR)  (Goal > 60%) 84.7 %       Office visit with referring provider's group within last year yes on 3-4-20       Suzie Fish RN

## 2020-09-16 NOTE — PROGRESS NOTES
ANTICOAGULATION FOLLOW-UP CLINIC VISIT    Patient Name:  Yusef Alvares  Date:  2020  Contact Type:  Telephone    SUBJECTIVE:  Patient Findings     Comments:   No changes in diet, activity level, medications (including over the counter), or health. No missed doses of warfarin. Patient took dosing as prescribed. No signs of clots or bleeding concerns. Patient will continue maintenance warfarin dosing.            Clinical Outcomes     Negatives:   Major bleeding event, Thromboembolic event, Anticoagulation-related hospital admission, Anticoagulation-related ED visit, Anticoagulation-related fatality    Comments:   No changes in diet, activity level, medications (including over the counter), or health. No missed doses of warfarin. Patient took dosing as prescribed. No signs of clots or bleeding concerns. Patient will continue maintenance warfarin dosing.               OBJECTIVE    Recent labs: (last 7 days)     20  0729   INR 2.40*       ASSESSMENT / PLAN  INR assessment THER    Recheck INR In: 6 WEEKS    INR Location Clinic      Anticoagulation Summary  As of 2020    INR goal:   2.0-3.0   TTR:   84.7 % (11.5 mo)   INR used for dosin.40 (2020)   Warfarin maintenance plan:   5 mg (5 mg x 1) every Sun, Thu; 7.5 mg (5 mg x 1.5) all other days   Full warfarin instructions:   5 mg every Sun, Thu; 7.5 mg all other days   Weekly warfarin total:   47.5 mg   No change documented:   Suzie Fish RN   Plan last modified:   Sonja Stone RN (2020)   Next INR check:   10/28/2020   Priority:   Maintenance   Target end date:   Indefinite    Indications    Pulmonary embolism (H) [I26.99]  Long term current use of anticoagulant therapy [Z79.01]             Anticoagulation Episode Summary     INR check location:       Preferred lab:       Send INR reminders to:   [facility]    Comments:   * PE hx. low protein C levels. Long term anticoagulation per Dr. Romo on 20      Anticoagulation Care  Providers     Provider Role Specialty Phone number    Yusef Katz MD [facility] [phone]            See the Encounter Report to view Anticoagulation Flowsheet and Dosing Calendar (Go to Encounters tab in chart review, and find the Anticoagulation Therapy Visit)        Suzie Fish RN

## 2020-09-24 VITALS — BODY MASS INDEX: 29.99 KG/M2 | HEIGHT: 65 IN | WEIGHT: 180 LBS

## 2020-09-24 ASSESSMENT — MIFFLIN-ST. JEOR: SCORE: 1624.38

## 2020-09-24 NOTE — PROGRESS NOTES
"Yusef Alvares is a 44 year old male who is being evaluated via a billable telephone visit.      The patient has been notified of following:     \"This telephone visit will be conducted via a call between you and your physician/provider. We have found that certain health care needs can be provided without the need for a physical exam.  This service lets us provide the care you need with a short phone conversation.  If a prescription is necessary we can send it directly to your pharmacy.  If lab work is needed we can place an order for that and you can then stop by our lab to have the test done at a later time.    Telephone visits are billed at different rates depending on your insurance coverage. During this emergency period, for some insurers they may be billed the same as an in-person visit.  Please reach out to your insurance provider with any questions.    If during the course of the call the physician/provider feels a telephone visit is not appropriate, you will not be charged for this service.\"    Patient has given verbal consent for Telephone visit?  Yes    What phone number would you like to be contacted at? [phone]    How would you like to obtain your AVS? MyChart    Annual visit for the follow-up of mild to moderate obstructive sleep apnea managed with CPAP.    Impression/Plan:     1.)  Mild to moderate ABDULKADIR (7/30/2008 with RDI 24, AHI 13.6, angelita desat 87%)      - Significant co-morbidities of schizophrenia NOS vs schizoaffective disorder NOS.   - Appears adequately controlled CPAP at 12 cm H2O and usage is stable.   - Responds well to positive feedback and assistance in a non-judgmental manner during the compliance process.   - Continue on current settings.    44-year-old male with history of schizophrenia NOS versus schizoaffective disorder NOS.    Last office visit on July 27, 2018.  Overall has been doing very well with CPAP, though CPAP stopped working about 2 weeks ago and will not par on.  He feels " his sleep is definitely not done as well since being unable to use his CPAP.  Orders placed to replace it on pressure 12 cm H2O.    Today, he feels he continues to do very well with his CPAP and largely just needs a new mask and new filters.  He feels he is sleeping well.    Reviewed CPAP download over the past 30 days on pressure 12 cm H2O.  Average daily usage about 261 minutes.  AHI 0.7.    Phone call duration: 25 minutes    Shay Cummins MD, MD

## 2020-09-25 ENCOUNTER — VIRTUAL VISIT (OUTPATIENT)
Dept: SLEEP MEDICINE | Facility: CLINIC | Age: 45
End: 2020-09-25
Payer: COMMERCIAL

## 2020-09-25 DIAGNOSIS — G47.33 OSA (OBSTRUCTIVE SLEEP APNEA): Primary | ICD-10-CM

## 2020-09-25 PROCEDURE — 99214 OFFICE O/P EST MOD 30 MIN: CPT | Mod: 95 | Performed by: FAMILY MEDICINE

## 2020-10-16 DIAGNOSIS — L30.9 DERMATITIS: ICD-10-CM

## 2020-10-16 RX ORDER — TRIAMCINOLONE ACETONIDE 1 MG/G
CREAM TOPICAL
Qty: 45 G | Refills: 1 | Status: SHIPPED | OUTPATIENT
Start: 2020-10-16 | End: 2020-12-10

## 2020-10-16 NOTE — TELEPHONE ENCOUNTER
"Requested Prescriptions   Pending Prescriptions Disp Refills     triamcinolone (KENALOG) 0.1 % external cream [Pharmacy Med Name: TRIAMCINOLONE 0.1% CRE] 45 g 2     Sig: APPLY TOPICALLY TWICE A DAY AS NEEDED ON HANDS & RIGHT LEG       Topical Steroids and Nonsteroidals Protocol Passed - 10/16/2020  7:54 AM        Passed - Patient is age 6 or older        Passed - Authorizing prescriber's most recent note related to this medication read.     If refill request is for ophthalmic use, please forward request to provider for approval.          Passed - High potency steroid not ordered        Passed - Recent (12 mo) or future (30 days) visit within the authorizing provider's specialty     Patient has had an office visit with the authorizing provider or a provider within the authorizing providers department within the previous 12 mos or has a future within next 30 days. See \"Patient Info\" tab in inbasket, or \"Choose Columns\" in Meds & Orders section of the refill encounter.              Passed - Medication is active on med list             "

## 2020-10-16 NOTE — TELEPHONE ENCOUNTER
Prescription approved per [facility] according to the [facility] Refill Protocol.    Jeri GILMAN RN, BSN

## 2020-11-02 DIAGNOSIS — Z79.01 LONG TERM CURRENT USE OF ANTICOAGULANT THERAPY: ICD-10-CM

## 2020-11-02 DIAGNOSIS — I26.99 ACUTE PULMONARY EMBOLISM WITHOUT ACUTE COR PULMONALE, UNSPECIFIED PULMONARY EMBOLISM TYPE (H): ICD-10-CM

## 2020-11-02 RX ORDER — WARFARIN SODIUM 5 MG/1
TABLET ORAL
Qty: 120 TABLET | Refills: 0 | Status: SHIPPED | OUTPATIENT
Start: 2020-11-02 | End: 2021-01-08

## 2020-11-02 NOTE — TELEPHONE ENCOUNTER
"Requested Prescriptions   Pending Prescriptions Disp Refills     warfarin ANTICOAGULANT (COUMADIN) 5 MG tablet 120 tablet 0     Sig: Take 5 mg (5 mg x 1) every Sun, Thu; 7.5 mg (5 mg x 1.5) all other days or as directed by the Anticoagulation Clinic       Vitamin K Antagonists Failed - 11/2/2020 12:49 PM        Failed - INR is within goal in the past 6 weeks     Confirm INR is within goal in the past 6 weeks.     Recent Labs   Lab Test 09/16/20  0729   INR 2.40*                       Passed - Recent (12 mo) or future (30 days) visit within the authorizing provider's specialty     Patient has had an office visit with the authorizing provider or a provider within the authorizing providers department within the previous 12 mos or has a future within next 30 days. See \"Patient Info\" tab in inbasket, or \"Choose Columns\" in Meds & Orders section of the refill encounter.              Passed - Medication is active on med list        Passed - Patient is 18 years of age or older             "

## 2020-11-02 NOTE — TELEPHONE ENCOUNTER
Reason for Call:  Medication refill:    Do you use a [facility]?  Name of the pharmacy and phone number for the current request:  [facility]    Name of the medication requested: Warfarin    Other request: Patient has lab appt on 11/5/2020    Can we leave a detailed message on this number? YES    Phone number patient can be reached at: Home number on file [phone] (home)    Best Time: Any    Call taken on 11/2/2020 at 11:57 AM by Evelyne Valladares

## 2020-11-05 ENCOUNTER — ANTICOAGULATION THERAPY VISIT (OUTPATIENT)
Dept: ANTICOAGULATION | Facility: CLINIC | Age: 45
End: 2020-11-05

## 2020-11-05 DIAGNOSIS — I26.99 PULMONARY EMBOLISM (H): ICD-10-CM

## 2020-11-05 DIAGNOSIS — Z79.01 LONG TERM CURRENT USE OF ANTICOAGULANT THERAPY: ICD-10-CM

## 2020-11-05 LAB
CAPILLARY BLOOD COLLECTION: NORMAL
INR PPP: 1.8 (ref 0.86–1.14)

## 2020-11-05 PROCEDURE — 36416 COLLJ CAPILLARY BLOOD SPEC: CPT | Performed by: FAMILY MEDICINE

## 2020-11-05 PROCEDURE — 85610 PROTHROMBIN TIME: CPT | Performed by: FAMILY MEDICINE

## 2020-11-05 NOTE — PROGRESS NOTES
ANTICOAGULATION MANAGEMENT     Patient Name:  Yusef Alvares  Date:  2020    ASSESSMENT /SUBJECTIVE:    Today's INR result of 1.80 is subtherapeutic. Goal INR of 2.0-3.0      Warfarin dose taken: Less warfarin taken than planned which may be affecting INR    Diet: No new diet changes affecting INR    Medication changes/ interactions: No new medications/supplements affecting INR    Previous INR: Therapeutic     S/S of bleeding or thromboembolism: No    New injury or illness: No    Upcoming surgery, procedure or cardioversion: No    Additional findings: Patient ran out of warfarin and only had enough to take 5 mg on Tues. He has since picked up his prescription and resumed his dose.       PLAN:    Telephone call with Yusef regarding INR result and instructed:     Warfarin Dosing Instructions: take 7.5 mg today then continue your current warfarin dose of 5 mg every Sun, Thu; 7.5 mg all other days    Instructed patient to follow up no later than: 2 weeks  Lab visit scheduled    Education provided: Target INR goal and significance of current INR result, Monitoring for clotting signs and symptoms and When to seek medical attention/emergency care      Kosta verbalizes understanding and agrees to warfarin dosing plan.    Instructed to call the Anticoagulation Clinic for any changes, questions or concerns. (#[phone])        Sonja Stone RN      OBJECTIVE:  Recent labs: (last 7 days)     20  0906   INR 1.80*         No question data found.  Anticoagulation Summary  As of 2020    INR goal:  2.0-3.0   TTR:  81.0 % (1 y)   INR used for dosin.80 (2020)   Warfarin maintenance plan:  5 mg (5 mg x 1) every Sun, Thu; 7.5 mg (5 mg x 1.5) all other days   Full warfarin instructions:  : 7.5 mg; Otherwise 5 mg every Sun, Thu; 7.5 mg all other days   Weekly warfarin total:  47.5 mg   Plan last modified:  Sonja Stone, RN (2020)   Next INR check:  2020   Priority:  Maintenance    Target end date:  Indefinite    Indications    Pulmonary embolism (H) [I26.99]  Long term current use of anticoagulant therapy [Z79.01]             Anticoagulation Episode Summary     INR check location:      Preferred lab:      Send INR reminders to:  [facility]    Comments:  * PE hx. low protein C levels. Long term anticoagulation per Dr. Romo on 7-9-20      Anticoagulation Care Providers     Provider Role Specialty Phone number    Yusef Katz MD Referring Family Medicine [phone]

## 2020-11-09 ENCOUNTER — TRANSFERRED RECORDS (OUTPATIENT)
Dept: HEALTH INFORMATION MANAGEMENT | Facility: CLINIC | Age: 45
End: 2020-11-09

## 2020-11-10 ENCOUNTER — DOCUMENTATION ONLY (OUTPATIENT)
Dept: SLEEP MEDICINE | Facility: CLINIC | Age: 45
End: 2020-11-10

## 2020-11-10 ENCOUNTER — IMMUNIZATION (OUTPATIENT)
Dept: FAMILY MEDICINE | Facility: CLINIC | Age: 45
End: 2020-11-10
Payer: COMMERCIAL

## 2020-11-10 DIAGNOSIS — G47.33 OSA (OBSTRUCTIVE SLEEP APNEA): Primary | ICD-10-CM

## 2020-11-10 DIAGNOSIS — Z23 NEED FOR PROPHYLACTIC VACCINATION AND INOCULATION AGAINST INFLUENZA: Primary | ICD-10-CM

## 2020-11-10 PROCEDURE — 90686 IIV4 VACC NO PRSV 0.5 ML IM: CPT

## 2020-11-10 PROCEDURE — 99207 PR NO CHARGE NURSE ONLY: CPT

## 2020-11-10 PROCEDURE — 90471 IMMUNIZATION ADMIN: CPT

## 2020-11-10 NOTE — PROGRESS NOTES
Patient came to [place]  for mask fitting appointment on November 10, 2020. Patient requested to switch masks because poor seal/leak. Discussed the following masks: Dreamwear nasal that he currently using, but with loosening his headgear, Dreamwear pillows- medium. Patient selected a [employer] Mask name: Dreamwear Pillows Pillow mask size Medium.

## 2020-11-25 ENCOUNTER — ANTICOAGULATION THERAPY VISIT (OUTPATIENT)
Dept: ANTICOAGULATION | Facility: CLINIC | Age: 45
End: 2020-11-25

## 2020-11-25 DIAGNOSIS — I26.99 PULMONARY EMBOLISM (H): ICD-10-CM

## 2020-11-25 DIAGNOSIS — Z79.01 LONG TERM CURRENT USE OF ANTICOAGULANT THERAPY: ICD-10-CM

## 2020-11-25 LAB — INR PPP: 2.37 (ref 0.86–1.14)

## 2020-11-25 PROCEDURE — 99207 PR NO CHARGE NURSE ONLY: CPT

## 2020-11-25 PROCEDURE — 36416 COLLJ CAPILLARY BLOOD SPEC: CPT | Performed by: FAMILY MEDICINE

## 2020-11-25 PROCEDURE — 85610 PROTHROMBIN TIME: CPT | Performed by: FAMILY MEDICINE

## 2020-11-25 NOTE — PROGRESS NOTES
ANTICOAGULATION FOLLOW-UP CLINIC VISIT    Patient Name:  Yusef Alvares  Date:  2020  Contact Type:  Telephone    SUBJECTIVE:  Patient Findings     Comments:  No changes in diet, activity level, medications (including over the counter), or health. No missed doses of warfarin. Patient took dosing as prescribed. No signs of clots or bleeding concerns. Patient will continue maintenance warfarin dosing.          Clinical Outcomes     Negatives:  Major bleeding event, Thromboembolic event, Anticoagulation-related hospital admission, Anticoagulation-related ED visit, Anticoagulation-related fatality    Comments:  No changes in diet, activity level, medications (including over the counter), or health. No missed doses of warfarin. Patient took dosing as prescribed. No signs of clots or bleeding concerns. Patient will continue maintenance warfarin dosing.             OBJECTIVE    Recent labs: (last 7 days)     20  1416   INR 2.37*       ASSESSMENT / PLAN  INR assessment THER    Recheck INR In: 5 WEEKS    INR Location Clinic      Anticoagulation Summary  As of 2020    INR goal:  2.0-3.0   TTR:  79.0 % (1 y)   INR used for dosin.37 (2020)   Warfarin maintenance plan:  5 mg (5 mg x 1) every Sun, Thu; 7.5 mg (5 mg x 1.5) all other days   Full warfarin instructions:  5 mg every Sun, Thu; 7.5 mg all other days   Weekly warfarin total:  47.5 mg   No change documented:  Suzie Fish RN   Plan last modified:  Sonja Stone RN (2020)   Next INR check:  2020   Priority:  Maintenance   Target end date:  Indefinite    Indications    Pulmonary embolism (H) [I26.99]  Long term current use of anticoagulant therapy [Z79.01]             Anticoagulation Episode Summary     INR check location:      Preferred lab:      Send INR reminders to:  [facility]    Comments:  * PE hx. low protein C levels. Long term anticoagulation per Dr. Romo on 20      Anticoagulation Care Providers     Provider  Role Specialty Phone number    Yusef Katz MD Referring Family Medicine [phone]            See the Encounter Report to view Anticoagulation Flowsheet and Dosing Calendar (Go to Encounters tab in chart review, and find the Anticoagulation Therapy Visit)        Suzie Fish RN

## 2020-11-30 ENCOUNTER — OFFICE VISIT (OUTPATIENT)
Dept: FAMILY MEDICINE | Facility: CLINIC | Age: 45
End: 2020-11-30
Payer: COMMERCIAL

## 2020-11-30 VITALS
WEIGHT: 186.4 LBS | RESPIRATION RATE: 16 BRPM | BODY MASS INDEX: 31.26 KG/M2 | DIASTOLIC BLOOD PRESSURE: 76 MMHG | OXYGEN SATURATION: 97 % | TEMPERATURE: 98.1 F | HEART RATE: 94 BPM | SYSTOLIC BLOOD PRESSURE: 120 MMHG

## 2020-11-30 DIAGNOSIS — S29.011A MUSCLE STRAIN OF CHEST WALL, INITIAL ENCOUNTER: Primary | ICD-10-CM

## 2020-11-30 PROCEDURE — 99213 OFFICE O/P EST LOW 20 MIN: CPT | Performed by: FAMILY MEDICINE

## 2020-11-30 NOTE — PROGRESS NOTES
Subjective     Yusef Alvares is a 45 year old male who presents to clinic today for the following health issues:    HPI         Musculoskeletal problem/pain  Onset/Duration: about a month. Off and on. Patient states he feels the pain more in the morning.  Description  Location: ribs - right side, diaphragm area   Joint Swelling: no  Redness: no  Pain: YES- when area is stretched  Warmth: no  Intensity:  mild  Progression of Symptoms:  Same and intermittent.   Accompanying signs and symptoms:   Fevers: no  Numbness/tingling/weakness: no  History  Trauma to the area: no  Recent illness:  no  Previous similar problem: no  Previous evaluation:  no  Precipitating or alleviating factors:  Aggravating factors include: stretched out  Therapies tried and outcome: nothing  Had the pain intermittently.  No relation to cough, sneeze, laughing, eating or drinking.  No gi symptoms.  Does a lot of lifting at work. No known trauma.  No skin changes.  Not using anything for his symptoms.      Review of Systems   Review Of Systems  Skin: negative  Eyes:   Ears/Nose/Throat:   Respiratory: negative  Cardiovascular: negative  Gastrointestinal: negative  Genitourinary:   Musculoskeletal: as above  Neurologic:   Psychiatric:   Hematologic/Lymphatic/Immunologic:   Endocrine:         Objective    /76   Pulse 94   Temp 98.1  F (36.7  C) (Tympanic)   Resp 16   Wt 84.6 kg (186 lb 6.4 oz)   SpO2 97%   BMI 31.26 kg/m    Body mass index is 31.26 kg/m .  Physical Exam   GENERAL APPEARANCE: healthy, alert and no distress  RESP: lungs clear to auscultation - no rales, rhonchi or wheezes  CV: regular rates and rhythm, normal S1 S2, no S3 or S4 and no murmur, click or rub  ABDOMEN: soft, nontender, without hepatosplenomegaly or masses and bowel sounds normal  MS: extremities normal- no gross deformities noted  SKIN: no suspicious lesions or rashes  NEURO: Normal strength and tone, mentation intact and speech normal  PSYCH: mentation  appears normal and affect normal/bright            Assessment & Plan     Muscle strain of chest wall, initial encounter  Symptomatic cares were discussed in details.  Right now not finding anything concerning.  Completed form for his insurance about having completes his medicare annual wellness this past year on 3/4/2020.          See Patient Instructions    Return in about 4 weeks (around 12/28/2020) for If not better.    Yusef Katz MD  [facility]

## 2020-11-30 NOTE — PATIENT INSTRUCTIONS
I do think you have a muscle strain to your right ribs.    Use over the counter pain medications.    Use ice as needed.          Thank you for choosing [facility].  You may be receiving an email and/or telephone survey request from [facility] Customer Experience regarding your visit today.  Please take a few minutes to respond to the survey to let us know how we are doing.      If you have questions or concerns, please contact us via [employer] or you can contact your care team at [phone].    Our Clinic hours are:  Monday 6:40 am  to 7:00 pm  Tuesday -Friday 6:40 am to 5:00 pm    The [place] outpatient lab hours are:  Monday - Friday 6:10 am to 4:45 pm  Saturdays 7:00 am to 11:00 am  Appointments are required, call [phone]    If you have clinical questions after hours or would like to schedule an appointment,  call the clinic at [phone].

## 2020-12-09 DIAGNOSIS — L30.9 DERMATITIS: ICD-10-CM

## 2020-12-10 RX ORDER — TRIAMCINOLONE ACETONIDE 1 MG/G
CREAM TOPICAL
Qty: 45 G | Refills: 1 | Status: SHIPPED | OUTPATIENT
Start: 2020-12-10 | End: 2021-02-09

## 2020-12-10 NOTE — TELEPHONE ENCOUNTER
"Requested Prescriptions   Pending Prescriptions Disp Refills     triamcinolone (KENALOG) 0.1 % external cream [Pharmacy Med Name: TRIAMCINOLONE 0.1% CRE] 45 g 1     Sig: APPLY TOPICALLY TWICE A DAY AS NEEDED ON HANDS & RIGHT LEG       Topical Steroids and Nonsteroidals Protocol Passed - 12/9/2020  4:42 PM        Passed - Patient is age 6 or older        Passed - Authorizing prescriber's most recent note related to this medication read.     If refill request is for ophthalmic use, please forward request to provider for approval.          Passed - High potency steroid not ordered        Passed - Recent (12 mo) or future (30 days) visit within the authorizing provider's specialty     Patient has had an office visit with the authorizing provider or a provider within the authorizing providers department within the previous 12 mos or has a future within next 30 days. See \"Patient Info\" tab in inbasket, or \"Choose Columns\" in Meds & Orders section of the refill encounter.              Passed - Medication is active on med list             "

## 2020-12-30 ENCOUNTER — ANTICOAGULATION THERAPY VISIT (OUTPATIENT)
Dept: ANTICOAGULATION | Facility: CLINIC | Age: 45
End: 2020-12-30

## 2020-12-30 DIAGNOSIS — I26.99 PULMONARY EMBOLISM (H): ICD-10-CM

## 2020-12-30 DIAGNOSIS — Z79.01 LONG TERM CURRENT USE OF ANTICOAGULANT THERAPY: ICD-10-CM

## 2020-12-30 LAB
CAPILLARY BLOOD COLLECTION: NORMAL
INR PPP: 3 (ref 0.86–1.14)

## 2020-12-30 PROCEDURE — 85610 PROTHROMBIN TIME: CPT | Performed by: FAMILY MEDICINE

## 2020-12-30 PROCEDURE — 99207 PR NO CHARGE NURSE ONLY: CPT

## 2020-12-30 PROCEDURE — 36416 COLLJ CAPILLARY BLOOD SPEC: CPT | Performed by: FAMILY MEDICINE

## 2020-12-30 NOTE — PROGRESS NOTES
Patient left a vm returning a call. Please call on Cell Number.  Fauzia Velarde, KARTHIK  Anticoagulation Nurse - [facility], [place]

## 2020-12-30 NOTE — PROGRESS NOTES
VM left for pt to return call to [facility] [phone]. Dosing instructions not given to pt.  Tentative plan: recheck INR in 6 weeks.  Thalia Sood RN on 12/30/2020 at 11:08 AM

## 2020-12-30 NOTE — PROGRESS NOTES
ANTICOAGULATION FOLLOW-UP CLINIC VISIT    Patient Name:  Yusef Alvares  Date:  12/30/2020  Contact Type:  Telephone    SUBJECTIVE:  Patient Findings     Comments:  No changes in medications, activity, or diet noted. No concerns with clotting, bleeding, or increased bruising noted. Took warfarin as prescribed. Bruise on his thumb but he did bump it on something - it did heal and is not there anymore.  Patient is to continue maintenance warfarin plan, and check INR in 6 weeks.  Patient verbalizes understanding and agrees to plan. No further questions or concerns.        Clinical Outcomes     Negatives:  Major bleeding event, Thromboembolic event, Anticoagulation-related hospital admission, Anticoagulation-related ED visit, Anticoagulation-related fatality    Comments:  No changes in medications, activity, or diet noted. No concerns with clotting, bleeding, or increased bruising noted. Took warfarin as prescribed. Bruise on his thumb but he did bump it on something - it did heal and is not there anymore.  Patient is to continue maintenance warfarin plan, and check INR in 6 weeks.  Patient verbalizes understanding and agrees to plan. No further questions or concerns.           OBJECTIVE    Recent labs: (last 7 days)     12/30/20  0836   INR 3.00*       ASSESSMENT / PLAN  INR assessment THER    Recheck INR In: 6 WEEKS    INR Location Clinic      Anticoagulation Summary  As of 12/30/2020    INR goal:  2.0-3.0   TTR:  86.9 % (1 y)   INR used for dosing:  3.00 (12/30/2020)   Warfarin maintenance plan:  5 mg (5 mg x 1) every Sun, Thu; 7.5 mg (5 mg x 1.5) all other days   Full warfarin instructions:  5 mg every Sun, Thu; 7.5 mg all other days   Weekly warfarin total:  47.5 mg   No change documented:  Thalia Sood, RN   Plan last modified:  Sonja Stone RN (1/21/2020)   Next INR check:  2/10/2021   Priority:  Maintenance   Target end date:  Indefinite    Indications    Pulmonary embolism (H) [I26.99]  Long term  current use of anticoagulant therapy [Z79.01]             Anticoagulation Episode Summary     INR check location:      Preferred lab:      Send INR reminders to:  [facility]    Comments:  * PE hx. low protein C levels. Long term anticoagulation per Dr. Romo on 7-9-20      Anticoagulation Care Providers     Provider Role Specialty Phone number    Yusef Katz MD Referring Family Medicine [phone]            See the Encounter Report to view Anticoagulation Flowsheet and Dosing Calendar (Go to Encounters tab in chart review, and find the Anticoagulation Therapy Visit)        Thalia Sood RN

## 2021-02-01 ENCOUNTER — TRANSFERRED RECORDS (OUTPATIENT)
Dept: HEALTH INFORMATION MANAGEMENT | Facility: CLINIC | Age: 46
End: 2021-02-01

## 2021-02-05 DIAGNOSIS — L30.9 DERMATITIS: ICD-10-CM

## 2021-02-05 NOTE — TELEPHONE ENCOUNTER
"Requested Prescriptions   Pending Prescriptions Disp Refills     triamcinolone (KENALOG) 0.1 % external cream [Pharmacy Med Name: Triamcinolone Acetonide 0.1% CREA] 45 g 1     Sig: APPLY TOPICALLY TWICE A DAY AS NEEDED ON HANDS & RIGHT LEG       Topical Steroids and Nonsteroidals Protocol Passed - 2/5/2021  2:37 PM        Passed - Patient is age 6 or older        Passed - Authorizing prescriber's most recent note related to this medication read.     If refill request is for ophthalmic use, please forward request to provider for approval.          Passed - High potency steroid not ordered        Passed - Recent (12 mo) or future (30 days) visit within the authorizing provider's specialty     Patient has had an office visit with the authorizing provider or a provider within the authorizing providers department within the previous 12 mos or has a future within next 30 days. See \"Patient Info\" tab in inbasket, or \"Choose Columns\" in Meds & Orders section of the refill encounter.              Passed - Medication is active on med list             "

## 2021-02-09 RX ORDER — TRIAMCINOLONE ACETONIDE 1 MG/G
CREAM TOPICAL
Qty: 45 G | Refills: 1 | Status: SHIPPED | OUTPATIENT
Start: 2021-02-09 | End: 2021-04-01

## 2021-02-10 ENCOUNTER — ANTICOAGULATION THERAPY VISIT (OUTPATIENT)
Dept: ANTICOAGULATION | Facility: CLINIC | Age: 46
End: 2021-02-10

## 2021-02-10 DIAGNOSIS — I26.99 PULMONARY EMBOLISM (H): ICD-10-CM

## 2021-02-10 DIAGNOSIS — Z79.01 LONG TERM CURRENT USE OF ANTICOAGULANT THERAPY: ICD-10-CM

## 2021-02-10 LAB
CAPILLARY BLOOD COLLECTION: NORMAL
INR PPP: 2.4 (ref 0.86–1.14)

## 2021-02-10 PROCEDURE — 36416 COLLJ CAPILLARY BLOOD SPEC: CPT | Performed by: FAMILY MEDICINE

## 2021-02-10 PROCEDURE — 99207 PR NO CHARGE NURSE ONLY: CPT

## 2021-02-10 PROCEDURE — 85610 PROTHROMBIN TIME: CPT | Performed by: FAMILY MEDICINE

## 2021-02-10 NOTE — PROGRESS NOTES
ANTICOAGULATION FOLLOW-UP CLINIC VISIT    Patient Name:  Yusef Alvares  Date:  2/10/2021  Contact Type:  Telephone    SUBJECTIVE:  Patient Findings     Comments:  Patient will continue weekly maintenance dose. INR is therapeutic.   Recheck in 6 weeks.   Patient verbalizes understanding and agrees to plan. No further questions or concerns.          Clinical Outcomes     Negatives:  Major bleeding event, Thromboembolic event, Anticoagulation-related hospital admission, Anticoagulation-related ED visit, Anticoagulation-related fatality    Comments:  Patient will continue weekly maintenance dose. INR is therapeutic.   Recheck in 6 weeks.   Patient verbalizes understanding and agrees to plan. No further questions or concerns.             OBJECTIVE    Recent labs: (last 7 days)     02/10/21  0846   INR 2.40*       ASSESSMENT / PLAN  INR assessment THER    Recheck INR In: 6 WEEKS    INR Location Outside lab      Anticoagulation Summary  As of 2/10/2021    INR goal:  2.0-3.0   TTR:  93.5 % (1 y)   INR used for dosin.40 (2/10/2021)   Warfarin maintenance plan:  5 mg (5 mg x 1) every Sun, Thu; 7.5 mg (5 mg x 1.5) all other days   Full warfarin instructions:  5 mg every Sun, Thu; 7.5 mg all other days   Weekly warfarin total:  47.5 mg   No change documented:  Jordan Norman, RN   Plan last modified:  Sonja Stone RN (2020)   Next INR check:  3/24/2021   Priority:  Maintenance   Target end date:  Indefinite    Indications    Pulmonary embolism (H) [I26.99]  Long term current use of anticoagulant therapy [Z79.01]             Anticoagulation Episode Summary     INR check location:      Preferred lab:      Send INR reminders to:  [facility]    Comments:  * PE hx. low protein C levels. Long term anticoagulation per Dr. Romo on 20      Anticoagulation Care Providers     Provider Role Specialty Phone number    Yusef Katz MD Referring Family Medicine [phone]            See the Encounter  Report to view Anticoagulation Flowsheet and Dosing Calendar (Go to Encounters tab in chart review, and find the Anticoagulation Therapy Visit)        Jordan Norman RN

## 2021-03-20 ENCOUNTER — E-VISIT (OUTPATIENT)
Dept: FAMILY MEDICINE | Facility: CLINIC | Age: 46
End: 2021-03-20
Payer: COMMERCIAL

## 2021-03-20 DIAGNOSIS — Z20.822 SUSPECTED COVID-19 VIRUS INFECTION: Primary | ICD-10-CM

## 2021-03-20 PROCEDURE — 99421 OL DIG E/M SVC 5-10 MIN: CPT | Performed by: FAMILY MEDICINE

## 2021-03-22 NOTE — PATIENT INSTRUCTIONS
Dear Yusef Alvares,    Your symptoms show that you may have coronavirus (COVID-19). This illness can cause fever, cough and trouble breathing. Many people get a mild case and get better on their own. Some people can get very sick.    Will I be tested for COVID-19?  We would like to test you for Covid-19 virus. I have placed orders for this test.     To schedule: go to your [employer] home page and scroll down to the section that says  You have an appointment that needs to be scheduled  and click the large green button that says  Schedule Now  and follow the steps to find the next available openings.    If you are unable to complete these [employer] scheduling steps, please call [phone] to schedule your testing.     Return to work/school/ guidance:  Please let your workplace manager and staffing office know when your quarantine ends     We can t give you an exact date as it depends on the above. You can calculate this on your own or work with your manager/staffing office to calculate this. (For example if you were exposed on 10/4, you would have to quarantine for 14 full days. That would be through 10/18. You could return on 10/19.)      If you receive a positive COVID-19 test result, follow the guidance of the those who are giving you the results. Usually the return to work is 10 (or in some cases 20 days from symptom onset.) If you work at [facility], you must also be cleared by Employee Occupational Health and Safety to return to work.        If you receive a negative COVID-19 test result and did not have a high risk exposure to someone with a known positive COVID-19 test, you can return to work once you're free of fever for 24 hours without fever-reducing medication and your symptoms are improving or resolved.      If you receive a negative COVID-19 test and If you had a high risk exposure to someone who has tested positive for COVID-19 then you can return to work 14 days after your last contact  with the positive individual    Note: If you have ongoing exposure to the covid positive person, this quarantine period may be more than 14 days. (For example, if you are continued to be exposed to your child who tested positive and cannot isolate from them, then the quarantine of 7-14 days can't start until your child is no longer contagious. This is typically 10 days from onset of the child's symptoms. So the total duration may be 17-24 days in this case.)    Sign up for [employer].   We know it's scary to hear that you might have COVID-19. We want to track your symptoms to make sure you're okay over the next 2 weeks. Please look for an email from [employer]--this is a free, online program that we'll use to keep in touch. To sign up, follow the link in the email you will receive. Learn more at [email or web address]    How can I take care of myself?    Get lots of rest. Drink extra fluids (unless a doctor has told you not to)    Take Tylenol (acetaminophen) or ibuprofen for fever or pain. If you have liver or kidney problems, ask your family doctor if it's okay to take Tylenol o ibuprofen    If you have other health problems (like cancer, heart failure, an organ transplant or severe kidney disease): Call your specialty clinic if you don't feel better in the next 2 days.    Know when to call 911. Emergency warning signs include:  o Trouble breathing or shortness of breath  o Pain or pressure in the chest that doesn't go away  o Feeling confused like you haven't felt before, or not being able to wake up  o Bluish-colored lips or face    Where can I get more information?  [facility] - About COVID-19:   [email or web address]    CDC - What to Do If You're Sick:   www.cdc.gov/coronavirus/2019-ncov/about/steps-when-sick.html

## 2021-03-24 ENCOUNTER — ANTICOAGULATION THERAPY VISIT (OUTPATIENT)
Dept: ANTICOAGULATION | Facility: CLINIC | Age: 46
End: 2021-03-24

## 2021-03-24 ENCOUNTER — TELEPHONE (OUTPATIENT)
Dept: FAMILY MEDICINE | Facility: CLINIC | Age: 46
End: 2021-03-24

## 2021-03-24 DIAGNOSIS — Z79.01 LONG TERM CURRENT USE OF ANTICOAGULANT THERAPY: ICD-10-CM

## 2021-03-24 DIAGNOSIS — I26.99 PULMONARY EMBOLISM (H): ICD-10-CM

## 2021-03-24 DIAGNOSIS — I26.99 ACUTE PULMONARY EMBOLISM WITHOUT ACUTE COR PULMONALE, UNSPECIFIED PULMONARY EMBOLISM TYPE (H): Primary | ICD-10-CM

## 2021-03-24 DIAGNOSIS — I26.99 ACUTE PULMONARY EMBOLISM WITHOUT ACUTE COR PULMONALE, UNSPECIFIED PULMONARY EMBOLISM TYPE (H): ICD-10-CM

## 2021-03-24 LAB
CAPILLARY BLOOD COLLECTION: NORMAL
INR PPP: 2.5 (ref 0.86–1.14)

## 2021-03-24 PROCEDURE — 85610 PROTHROMBIN TIME: CPT | Performed by: FAMILY MEDICINE

## 2021-03-24 PROCEDURE — 36416 COLLJ CAPILLARY BLOOD SPEC: CPT | Performed by: FAMILY MEDICINE

## 2021-03-24 NOTE — PROGRESS NOTES
ANTICOAGULATION MANAGEMENT     Patient Name:  Yusef Alvares  Date:  3/24/2021    ASSESSMENT /SUBJECTIVE:    Today's INR result of 2.5 is therapeutic. Goal INR of 2.0-3.0      Warfarin dose taken: Warfarin taken as instructed    Diet: No new diet changes affecting INR    Medication changes/ interactions: No new medications/supplements affecting INR    Previous INR: Therapeutic     S/S of bleeding or thromboembolism: No    New injury or illness: Yes: patient having chest congestion and will be getting tested for COVID on 3/28/21    Upcoming surgery, procedure or cardioversion: No    Additional findings: None      PLAN:    Telephone call with Yusef regarding INR result and instructed:     Warfarin Dosing Instructions: Continue your current warfarin dose 5 mg Sun/Thur and 7.5 mg all other days.      Instructed patient to follow up no later than: 6 weeks  Lab visit scheduled  Advised patient to have INR checked sooner if cold symptoms worsen.    Education provided: Target INR goal and significance of current INR result, Importance of therapeutic range, Importance of following up for INR monitoring at instructed interval, Importance of taking warfarin as instructed and Importance of notifying clinic for diarrhea, nausea/vomiting, reduced intake, and/or illness; a sooner lab recheck maybe needed.      Kosta verbalizes understanding and agrees to warfarin dosing plan.    Instructed to call the Anticoagulation Clinic for any changes, questions or concerns. (#[phone])        Hallie Zamarripa RN      OBJECTIVE:  Recent labs: (last 7 days)     21  0859   INR 2.50*         No question data found.  Anticoagulation Summary  As of 3/24/2021    INR goal:  2.0-3.0   TTR:  93.5 % (1 y)   INR used for dosin.50 (3/24/2021)   Warfarin maintenance plan:  5 mg (5 mg x 1) every Sun, Thu; 7.5 mg (5 mg x 1.5) all other days   Full warfarin instructions:  5 mg every Sun, Thu; 7.5 mg all other days   Weekly warfarin total:   47.5 mg   No change documented:  Hallie Zamarripa, RN   Plan last modified:  Sonja Stone, RN (1/21/2020)   Next INR check:  5/5/2021   Priority:  Maintenance   Target end date:  Indefinite    Indications    Pulmonary embolism (H) [I26.99]  Long term current use of anticoagulant therapy [Z79.01]             Anticoagulation Episode Summary     INR check location:      Preferred lab:      Send INR reminders to:  [facility]    Comments:  * PE hx. low protein C levels. Long term anticoagulation per Dr. Romo on 7-9-20      Anticoagulation Care Providers     Provider Role Specialty Phone number    Yusef Katz MD Referring Family Medicine [phone]         MIKY Mcneal, RN  Anticoagulation Clinic

## 2021-03-24 NOTE — TELEPHONE ENCOUNTER
Lab called for new INR standing orders as previous orders have .    Orders placed.    JANELL McnealN, RN  Anticoagulation Clinic

## 2021-03-30 ENCOUNTER — IMMUNIZATION (OUTPATIENT)
Dept: FAMILY MEDICINE | Facility: CLINIC | Age: 46
End: 2021-03-30
Payer: COMMERCIAL

## 2021-03-30 PROCEDURE — 91301 PR COVID VAC MODERNA 100 MCG/0.5 ML IM: CPT

## 2021-03-30 PROCEDURE — 0011A PR COVID VAC MODERNA 100 MCG/0.5 ML IM: CPT

## 2021-04-01 DIAGNOSIS — Z79.01 LONG TERM CURRENT USE OF ANTICOAGULANT THERAPY: ICD-10-CM

## 2021-04-01 DIAGNOSIS — R73.9 HYPERGLYCEMIA: Primary | ICD-10-CM

## 2021-04-01 DIAGNOSIS — E78.5 HYPERLIPIDEMIA LDL GOAL <130: ICD-10-CM

## 2021-04-01 DIAGNOSIS — I26.99 ACUTE PULMONARY EMBOLISM WITHOUT ACUTE COR PULMONALE, UNSPECIFIED PULMONARY EMBOLISM TYPE (H): ICD-10-CM

## 2021-04-01 DIAGNOSIS — E03.9 HYPOTHYROIDISM, UNSPECIFIED TYPE: ICD-10-CM

## 2021-04-01 DIAGNOSIS — L30.9 DERMATITIS: ICD-10-CM

## 2021-04-01 RX ORDER — WARFARIN SODIUM 5 MG/1
TABLET ORAL
Qty: 41 TABLET | Refills: 0 | Status: SHIPPED | OUTPATIENT
Start: 2021-04-01 | End: 2021-05-04

## 2021-04-01 RX ORDER — SIMVASTATIN 40 MG
TABLET ORAL
Qty: 30 TABLET | Refills: 0 | Status: SHIPPED | OUTPATIENT
Start: 2021-04-01 | End: 2021-05-04

## 2021-04-01 RX ORDER — LEVOTHYROXINE SODIUM 50 UG/1
TABLET ORAL
Qty: 30 TABLET | Refills: 0 | Status: SHIPPED | OUTPATIENT
Start: 2021-04-01 | End: 2021-05-04

## 2021-04-01 RX ORDER — TRIAMCINOLONE ACETONIDE 1 MG/G
CREAM TOPICAL
Qty: 45 G | Refills: 0 | Status: SHIPPED | OUTPATIENT
Start: 2021-04-01 | End: 2021-05-04

## 2021-04-01 RX ORDER — SIMVASTATIN 20 MG
TABLET ORAL
Qty: 30 TABLET | Refills: 0 | Status: SHIPPED | OUTPATIENT
Start: 2021-04-01 | End: 2021-05-04

## 2021-04-01 NOTE — TELEPHONE ENCOUNTER
"Requested Prescriptions   Pending Prescriptions Disp Refills     levothyroxine (SYNTHROID/LEVOTHROID) 50 MCG tablet [Pharmacy Med Name: Levothyroxine Sodium 50MCG TABS] 30 tablet      Sig: TAKE 1 TABLET BY MOUTH DAILY       Thyroid Protocol Failed - 4/1/2021  3:22 PM        Failed - Normal TSH on file in past 12 months     Recent Labs   Lab Test 03/04/20  1214   TSH 0.63              Passed - Patient is 12 years or older        Passed - Recent (12 mo) or future (30 days) visit within the authorizing provider's specialty     Patient has had an office visit with the authorizing provider or a provider within the authorizing providers department within the previous 12 mos or has a future within next 30 days. See \"Patient Info\" tab in inbasket, or \"Choose Columns\" in Meds & Orders section of the refill encounter.              Passed - Medication is active on med list           simvastatin (ZOCOR) 20 MG tablet [Pharmacy Med Name: Simvastatin 20MG TABS] 30 tablet      Sig: TAKE 1 TABLET BY MOUTH AT BEDTIME ALONG WITH 40MG TO EQUAL 60MG       Statins Protocol Failed - 4/1/2021  3:22 PM        Failed - LDL on file in past 12 months     Recent Labs   Lab Test 03/04/20  1214   *             Passed - No abnormal creatine kinase in past 12 months     No lab results found.             Passed - Recent (12 mo) or future (30 days) visit within the authorizing provider's specialty     Patient has had an office visit with the authorizing provider or a provider within the authorizing providers department within the previous 12 mos or has a future within next 30 days. See \"Patient Info\" tab in inbasket, or \"Choose Columns\" in Meds & Orders section of the refill encounter.              Passed - Medication is active on med list        Passed - Patient is age 18 or older           simvastatin (ZOCOR) 40 MG tablet [Pharmacy Med Name: Simvastatin 40MG TABS] 30 tablet      Sig: TAKE 1 TABLET BY MOUTH DAILY ALONG WITH 20MG TO EQUAL 60MG " "      Statins Protocol Failed - 4/1/2021  3:22 PM        Failed - LDL on file in past 12 months     Recent Labs   Lab Test 03/04/20  1214   *             Passed - No abnormal creatine kinase in past 12 months     No lab results found.             Passed - Recent (12 mo) or future (30 days) visit within the authorizing provider's specialty     Patient has had an office visit with the authorizing provider or a provider within the authorizing providers department within the previous 12 mos or has a future within next 30 days. See \"Patient Info\" tab in inbasket, or \"Choose Columns\" in Meds & Orders section of the refill encounter.              Passed - Medication is active on med list        Passed - Patient is age 18 or older           triamcinolone (KENALOG) 0.1 % external cream [Pharmacy Med Name: Triamcinolone Acetonide 0.1% CREA] 45 g 1     Sig: APPLY TOPICALLY TWICE A DAY AS NEEDED ON HANDS & RIGHT LEG       Topical Steroids and Nonsteroidals Protocol Passed - 4/1/2021  3:22 PM        Passed - Patient is age 6 or older        Passed - Authorizing prescriber's most recent note related to this medication read.     If refill request is for ophthalmic use, please forward request to provider for approval.          Passed - High potency steroid not ordered        Passed - Recent (12 mo) or future (30 days) visit within the authorizing provider's specialty     Patient has had an office visit with the authorizing provider or a provider within the authorizing providers department within the previous 12 mos or has a future within next 30 days. See \"Patient Info\" tab in inbasket, or \"Choose Columns\" in Meds & Orders section of the refill encounter.              Passed - Medication is active on med list           warfarin ANTICOAGULANT (COUMADIN) 5 MG tablet [Pharmacy Med Name: Warfarin Sodium 5MG TABS] 41 tablet      Sig: TAKE 1 TABLET (5MG) BY MOUTH EVERY SUNDAY & THURSDAY AND TAKE 1 & 1/2 TABLETS (7.5MG) ALL OTHER " "DAYS OR       Vitamin K Antagonists Failed - 4/1/2021  3:22 PM        Failed - INR is within goal in the past 6 weeks     Confirm INR is within goal in the past 6 weeks.     Recent Labs   Lab Test 03/24/21  0859   INR 2.50*                       Passed - Recent (12 mo) or future (30 days) visit within the authorizing provider's specialty     Patient has had an office visit with the authorizing provider or a provider within the authorizing providers department within the previous 12 mos or has a future within next 30 days. See \"Patient Info\" tab in inbasket, or \"Choose Columns\" in Meds & Orders section of the refill encounter.              Passed - Medication is active on med list        Passed - Patient is 18 years of age or older             "

## 2021-04-17 ENCOUNTER — HEALTH MAINTENANCE LETTER (OUTPATIENT)
Age: 46
End: 2021-04-17

## 2021-04-27 ENCOUNTER — IMMUNIZATION (OUTPATIENT)
Dept: FAMILY MEDICINE | Facility: CLINIC | Age: 46
End: 2021-04-27
Attending: FAMILY MEDICINE
Payer: COMMERCIAL

## 2021-04-27 PROCEDURE — 91301 PR COVID VAC MODERNA 100 MCG/0.5 ML IM: CPT

## 2021-04-27 PROCEDURE — 0012A PR COVID VAC MODERNA 100 MCG/0.5 ML IM: CPT

## 2021-05-03 ENCOUNTER — TRANSFERRED RECORDS (OUTPATIENT)
Dept: HEALTH INFORMATION MANAGEMENT | Facility: CLINIC | Age: 46
End: 2021-05-03

## 2021-05-03 ENCOUNTER — MYC MEDICAL ADVICE (OUTPATIENT)
Dept: FAMILY MEDICINE | Facility: CLINIC | Age: 46
End: 2021-05-03

## 2021-05-14 ENCOUNTER — E-VISIT (OUTPATIENT)
Dept: FAMILY MEDICINE | Facility: CLINIC | Age: 46
End: 2021-05-14
Payer: COMMERCIAL

## 2021-05-14 DIAGNOSIS — Z20.822 CLOSE EXPOSURE TO 2019 NOVEL CORONAVIRUS: Primary | ICD-10-CM

## 2021-05-14 PROCEDURE — 99421 OL DIG E/M SVC 5-10 MIN: CPT | Performed by: FAMILY MEDICINE

## 2021-05-14 NOTE — PATIENT INSTRUCTIONS
"  Dear Yusef Alvares,    Based on your exposure to COVID-19 (coronavirus), we would like to test you for this virus. I have placed an order for this test.The best time for testing is 5-7 days after the exposure.    How to schedule:  Go to your [employer] home page and scroll down to the section that says  You have an appointment that needs to be scheduled  and click the large green button that says  Schedule Now  and follow the steps to find the next available opening.     If you are unable to complete these [employer] scheduling steps, please call [phone] to schedule your testing.     Return to work/school/ guidance:   For people with high risk exposures outside the home    Please let your workplace manager and staffing office know when your quarantine ends.     We can not give you an exact date as it depends on the information below. You can calculate this on your own or work with your manager/staffing office to calculate this. (For example if you were exposed on 10/4, you would have to quarantine for 14 full days. That would be through 10/18. You could return on 10/19.)    Quarantine Guidelines:  Patients (\"contacts\") who have been in close prolonged contact of an infected person(s) (within six feet for at least 15 minutes within a 24 hour period), and remain asymptomatic should enter quarantine based on the following options:    14-day quarantine period (this remains the CDC recommendation for the greatest protection against spread of COVID-19) OR    Minimum 7-day quarantine with negative RT-PCR test collected on day 5 or later OR    10-day quarantine with no test  Quarantine Guideline exceptions are as follows:    People who have been fully vaccinated do not need to quarantine if the exposure was at least 2 weeks after the last vaccination. This includes vaccinated health care workers.    Not fully vaccinated and unvaccinated Individuals who work in health care, congregate care, or congregate living " should be off work for 14 days from their last date of exposure. Community activities for this group can be resumed based on options above. Fully vaccinated individuals in this group do not need to quarantine from work after exposure.    Not fully vaccinated and unvaccinated people whose high-risk exposure was a household member should always quarantine for 14 days from their last date of exposure. Fully vaccinated people in this category do not need to quarantine.    Not fully vaccinated or unvaccinated residents of congregate care and congregate living settings should always quarantine for 14 days from their last date of exposure. Fully vaccinated residents do not need to quarantine.  Note: If you have ongoing exposure to the covid positive person, this quarantine period may be more than 14 days. (For example, if you are continued to be exposed to your child who tested positive and cannot isolate from them, then the quarantine of 7-14 days can't start until your child is no longer contagious. This is typically 10 days from onset of the child's symptoms. So the total duration may be 17-24 days in this case.)    You should continue symptom monitoring until day 14 post-exposure. If you develop signs or symptoms of COVID-19, isolate and get tested (even if you have been tested already).    How to quarantine:   Stay home and away from others. Don't go to school or anywhere else. Generally quarantine means staying home from work but there are some exceptions to this. Please contact your workplace.  No hugging, kissing or shaking hands.  Don't let anyone visit.  Cover your mouth and nose with a mask, tissue or washcloth to avoid spreading germs.  Wash your hands and face often. Use soap and water.    What are the symptoms of COVID-19?  The most common symptoms are cough, fever and trouble breathing. Less common symptoms include headache, body aches, fatigue (feeling very tired), chills, sore throat, stuffy or runny nose,  diarrhea (loose poop), loss of taste or smell, belly pain, and nausea or vomiting (feeling sick to your stomach or throwing up).  After 14 days, if you have still don't have symptoms, you likely don't have this virus.  If you develop symptoms, follow these guidelines.  If you're normally healthy: Please start another eVisit.  If you have a serious health problem (like cancer, heart failure, an organ transplant or kidney disease): Call your specialty clinic. Let them know that you might have COVID-19.    Where can I get more information?  [facility] - About COVID-19: [email or web address]  CDC - What to Do If You're Sick: www.cdc.gov/coronavirus/2019-ncov/about/steps-when-sick.html  CDC - Ending Home Isolation: www.cdc.gov/coronavirus/2019-ncov/hcp/disposition-in-home-patients.html  CDC - Caring for Someone: www.cdc.gov/coronavirus/2019-ncov/if-you-are-sick/care-for-someone.html  [facility] clinical trials (COVID-19 research studies): [email or web address].[email or web address]-clinical-trials  Below are the COVID-19 hotlines at the [place] [facility] ([facility]). Interpreters are available.  For health questions: Call 112-866-3369 or 1-723.458.3237 (7 a.m. to 7 p.m.)  For questions about schools and childcare: Call 890-980-3013 or 1-120.997.9784 (7 a.m. to 7 p.m.)

## 2021-05-16 DIAGNOSIS — Z20.822 CLOSE EXPOSURE TO 2019 NOVEL CORONAVIRUS: ICD-10-CM

## 2021-05-16 LAB
LABORATORY COMMENT REPORT: NORMAL
SARS-COV-2 RNA RESP QL NAA+PROBE: NEGATIVE
SARS-COV-2 RNA RESP QL NAA+PROBE: NORMAL
SPECIMEN SOURCE: NORMAL
SPECIMEN SOURCE: NORMAL

## 2021-05-16 PROCEDURE — U0003 INFECTIOUS AGENT DETECTION BY NUCLEIC ACID (DNA OR RNA); SEVERE ACUTE RESPIRATORY SYNDROME CORONAVIRUS 2 (SARS-COV-2) (CORONAVIRUS DISEASE [COVID-19]), AMPLIFIED PROBE TECHNIQUE, MAKING USE OF HIGH THROUGHPUT TECHNOLOGIES AS DESCRIBED BY CMS-2020-01-R: HCPCS | Performed by: FAMILY MEDICINE

## 2021-05-16 PROCEDURE — U0005 INFEC AGEN DETEC AMPLI PROBE: HCPCS | Performed by: FAMILY MEDICINE

## 2021-05-16 PROCEDURE — 99207 PR NO CHARGE LOS: CPT

## 2021-05-17 ENCOUNTER — ANTICOAGULATION THERAPY VISIT (OUTPATIENT)
Dept: ANTICOAGULATION | Facility: CLINIC | Age: 46
End: 2021-05-17

## 2021-05-17 DIAGNOSIS — Z79.01 LONG TERM CURRENT USE OF ANTICOAGULANT THERAPY: ICD-10-CM

## 2021-05-17 DIAGNOSIS — I26.99 ACUTE PULMONARY EMBOLISM WITHOUT ACUTE COR PULMONALE, UNSPECIFIED PULMONARY EMBOLISM TYPE (H): ICD-10-CM

## 2021-05-17 DIAGNOSIS — I26.99 PULMONARY EMBOLISM (H): ICD-10-CM

## 2021-05-17 LAB
CAPILLARY BLOOD COLLECTION: NORMAL
INR PPP: 2.9 (ref 0.86–1.14)

## 2021-05-17 PROCEDURE — 85610 PROTHROMBIN TIME: CPT | Performed by: FAMILY MEDICINE

## 2021-05-17 PROCEDURE — 36416 COLLJ CAPILLARY BLOOD SPEC: CPT | Performed by: FAMILY MEDICINE

## 2021-05-17 NOTE — PROGRESS NOTES
ANTICOAGULATION MANAGEMENT     Patient Name:  Yusef Avlares  Date:  2021    ASSESSMENT /SUBJECTIVE:    Today's INR result of 2.90 is therapeutic. Goal INR of 2.0-3.0      Warfarin dose taken: Warfarin taken as instructed    Diet: No new diet changes affecting INR    Medication changes/ interactions: No new medications/supplements affecting INR    Previous INR: Therapeutic 2.50    S/S of bleeding or thromboembolism: No    New injury or illness: No    Upcoming surgery, procedure or cardioversion: No    Additional findings: covid test yesterday was negative. His exposure was 16 days ago, he has no symptoms.       PLAN:    Telephone call with  Kosta regarding INR result and instructed:     Warfarin Dosing Instructions: Continue your current warfarin dose 5mg Sun/Thurs; 7.5mg all other days    Instructed patient to follow up no later than: 6 weeks  Lab visit scheduled    Education provided: Importance of notifying clinic for changes in medications or health; a sooner lab recheck maybe needed       Kosta verbalizes understanding and agrees to warfarin dosing plan.    Instructed to call the Anticoagulation Clinic for any changes, questions or concerns. (#[phone])        Marcelina Moore RN CACP       OBJECTIVE:  Recent labs: (last 7 days)     21  0841   INR 2.90*         INR assessment THER    Recheck INR In: 6 WEEKS    INR Location Clinic      Anticoagulation Summary  As of 2021    INR goal:  2.0-3.0   TTR:  93.5 % (1 y)   INR used for dosin.90 (2021)   Warfarin maintenance plan:  5 mg (5 mg x 1) every Sun, Thu; 7.5 mg (5 mg x 1.5) all other days   Full warfarin instructions:  5 mg every Sun, Thu; 7.5 mg all other days   Weekly warfarin total:  47.5 mg   No change documented:  Marcelina Moore RN   Plan last modified:  Sonja Stone RN (2020)   Next INR check:  2021   Priority:  Maintenance   Target end date:  Indefinite    Indications    Pulmonary embolism (H)  [I26.99]  Long term current use of anticoagulant therapy [Z79.01]             Anticoagulation Episode Summary     INR check location:      Preferred lab:      Send INR reminders to:  [facility]    Comments:  * PE hx. low protein C levels. Long term anticoagulation per Dr. Romo on 7-9-20      Anticoagulation Care Providers     Provider Role Specialty Phone number    Yusef Katz MD Referring Family Medicine [phone]

## 2021-05-17 NOTE — PROGRESS NOTES
Anticoagulation Management    Unable to reach Kosta today.    Today's INR result of 2.90 is therapeutic (goal INR of 2.0-3.0).  Result received from: Clinic Lab    Follow up required to confirm warfarin dose taken and assess for changes    Left voicemail for patient to call [facility] back. Patient had a covid test completed yesterday (came back negative). Need to confirm if the test was taken only due to exposure or if patient was having symptoms.      Tentative plan: If patient has no concerns of illness, continue 5mg Sun/Thurs; 7.5mg all other days. Recheck the INR again in 6 weeks.    For callbacks transfer to [phone] until 5 PM.    Anticoagulation clinic to follow up    Marcelina Moore RN

## 2021-05-24 DIAGNOSIS — L30.9 DERMATITIS: ICD-10-CM

## 2021-05-24 DIAGNOSIS — E03.9 HYPOTHYROIDISM, UNSPECIFIED TYPE: ICD-10-CM

## 2021-05-24 DIAGNOSIS — E78.5 HYPERLIPIDEMIA LDL GOAL <130: ICD-10-CM

## 2021-05-25 RX ORDER — TRIAMCINOLONE ACETONIDE 1 MG/G
CREAM TOPICAL
Qty: 45 G | Refills: 0 | Status: SHIPPED | OUTPATIENT
Start: 2021-05-25 | End: 2021-05-28

## 2021-05-25 RX ORDER — LEVOTHYROXINE SODIUM 50 UG/1
TABLET ORAL
Qty: 30 TABLET | Refills: 0 | Status: SHIPPED | OUTPATIENT
Start: 2021-05-25 | End: 2021-05-28

## 2021-05-25 RX ORDER — SIMVASTATIN 40 MG
TABLET ORAL
Qty: 30 TABLET | Refills: 0 | Status: SHIPPED | OUTPATIENT
Start: 2021-05-25 | End: 2021-05-28

## 2021-05-25 RX ORDER — SIMVASTATIN 20 MG
TABLET ORAL
Qty: 30 TABLET | Refills: 0 | Status: SHIPPED | OUTPATIENT
Start: 2021-05-25 | End: 2021-05-28

## 2021-05-25 ASSESSMENT — ENCOUNTER SYMPTOMS
DIARRHEA: 0
JOINT SWELLING: 0
HEMATOCHEZIA: 0
NAUSEA: 0
WEAKNESS: 0
NERVOUS/ANXIOUS: 0
EYE PAIN: 0
PALPITATIONS: 0
CHILLS: 0
DYSURIA: 0
FEVER: 0
PARESTHESIAS: 0
ABDOMINAL PAIN: 0
SORE THROAT: 0
MYALGIAS: 0
HEMATURIA: 0
SHORTNESS OF BREATH: 0
HEARTBURN: 0
DIZZINESS: 0
FREQUENCY: 0
ARTHRALGIAS: 1
CONSTIPATION: 0
COUGH: 0
HEADACHES: 0

## 2021-05-25 ASSESSMENT — ACTIVITIES OF DAILY LIVING (ADL): CURRENT_FUNCTION: NO ASSISTANCE NEEDED

## 2021-05-25 NOTE — TELEPHONE ENCOUNTER
"Has appointment scheduled for 5/28/21    Requested Prescriptions   Pending Prescriptions Disp Refills     levothyroxine (SYNTHROID/LEVOTHROID) 50 MCG tablet [Pharmacy Med Name: Levothyroxine Sodium 50MCG TABS] 30 tablet 0     Sig: TAKE 1 TABLET BY MOUTH DAILY       Thyroid Protocol Failed - 5/24/2021  3:32 PM        Failed - Normal TSH on file in past 12 months     Recent Labs   Lab Test 03/04/20  1214   TSH 0.63              Passed - Patient is 12 years or older        Passed - Recent (12 mo) or future (30 days) visit within the authorizing provider's specialty     Patient has had an office visit with the authorizing provider or a provider within the authorizing providers department within the previous 12 mos or has a future within next 30 days. See \"Patient Info\" tab in inbasket, or \"Choose Columns\" in Meds & Orders section of the refill encounter.              Passed - Medication is active on med list           simvastatin (ZOCOR) 20 MG tablet [Pharmacy Med Name: Simvastatin 20MG TABS] 30 tablet 0     Sig: TAKE 1 TABLET BY MOUTH AT BEDTIME ALONG WITH 40MG TO EQUAL 60MG       Statins Protocol Failed - 5/24/2021  3:32 PM        Failed - LDL on file in past 12 months     Recent Labs   Lab Test 03/04/20  1214   *             Passed - No abnormal creatine kinase in past 12 months     No lab results found.             Passed - Recent (12 mo) or future (30 days) visit within the authorizing provider's specialty     Patient has had an office visit with the authorizing provider or a provider within the authorizing providers department within the previous 12 mos or has a future within next 30 days. See \"Patient Info\" tab in inbasket, or \"Choose Columns\" in Meds & Orders section of the refill encounter.              Passed - Medication is active on med list        Passed - Patient is age 18 or older           simvastatin (ZOCOR) 40 MG tablet [Pharmacy Med Name: Simvastatin 40MG TABS] 30 tablet 0     Sig: TAKE 1 " "TABLET BY MOUTH DAILY ALONG WITH 20MG TO EQUAL 60MG       Statins Protocol Failed - 5/24/2021  3:32 PM        Failed - LDL on file in past 12 months     Recent Labs   Lab Test 03/04/20  1214   *             Passed - No abnormal creatine kinase in past 12 months     No lab results found.             Passed - Recent (12 mo) or future (30 days) visit within the authorizing provider's specialty     Patient has had an office visit with the authorizing provider or a provider within the authorizing providers department within the previous 12 mos or has a future within next 30 days. See \"Patient Info\" tab in inbasket, or \"Choose Columns\" in Meds & Orders section of the refill encounter.              Passed - Medication is active on med list        Passed - Patient is age 18 or older           triamcinolone (KENALOG) 0.1 % external cream [Pharmacy Med Name: Triamcinolone Acetonide 0.1% CREA] 45 g 0     Sig: APPLY TOPICALLY TWICE A DAY AS NEEDED ON HANDS & RIGHT LEG       Topical Steroids and Nonsteroidals Protocol Passed - 5/24/2021  3:32 PM        Passed - Patient is age 6 or older        Passed - Authorizing prescriber's most recent note related to this medication read.     If refill request is for ophthalmic use, please forward request to provider for approval.          Passed - High potency steroid not ordered        Passed - Recent (12 mo) or future (30 days) visit within the authorizing provider's specialty     Patient has had an office visit with the authorizing provider or a provider within the authorizing providers department within the previous 12 mos or has a future within next 30 days. See \"Patient Info\" tab in inbasket, or \"Choose Columns\" in Meds & Orders section of the refill encounter.              Passed - Medication is active on med list             "

## 2021-05-28 ENCOUNTER — OFFICE VISIT (OUTPATIENT)
Dept: FAMILY MEDICINE | Facility: CLINIC | Age: 46
End: 2021-05-28
Payer: COMMERCIAL

## 2021-05-28 VITALS
SYSTOLIC BLOOD PRESSURE: 118 MMHG | RESPIRATION RATE: 18 BRPM | WEIGHT: 183.8 LBS | TEMPERATURE: 98.7 F | BODY MASS INDEX: 30.62 KG/M2 | OXYGEN SATURATION: 94 % | HEIGHT: 65 IN | HEART RATE: 83 BPM | DIASTOLIC BLOOD PRESSURE: 68 MMHG

## 2021-05-28 DIAGNOSIS — E78.5 HYPERLIPIDEMIA LDL GOAL <130: ICD-10-CM

## 2021-05-28 DIAGNOSIS — L30.9 DERMATITIS: ICD-10-CM

## 2021-05-28 DIAGNOSIS — Z00.00 ENCOUNTER FOR MEDICARE ANNUAL WELLNESS EXAM: Primary | ICD-10-CM

## 2021-05-28 DIAGNOSIS — F20.9 SCHIZOPHRENIA, UNSPECIFIED TYPE (H): ICD-10-CM

## 2021-05-28 DIAGNOSIS — R73.9 HYPERGLYCEMIA: ICD-10-CM

## 2021-05-28 DIAGNOSIS — E03.9 HYPOTHYROIDISM, UNSPECIFIED TYPE: ICD-10-CM

## 2021-05-28 LAB
ANION GAP SERPL CALCULATED.3IONS-SCNC: 3 MMOL/L (ref 3–14)
BUN SERPL-MCNC: 19 MG/DL (ref 7–30)
CALCIUM SERPL-MCNC: 9.3 MG/DL (ref 8.5–10.1)
CHLORIDE SERPL-SCNC: 104 MMOL/L (ref 94–109)
CHOLEST SERPL-MCNC: 241 MG/DL
CO2 SERPL-SCNC: 33 MMOL/L (ref 20–32)
CREAT SERPL-MCNC: 1.02 MG/DL (ref 0.66–1.25)
GFR SERPL CREATININE-BSD FRML MDRD: 87 ML/MIN/{1.73_M2}
GLUCOSE SERPL-MCNC: 99 MG/DL (ref 70–99)
HBA1C MFR BLD: 5.4 % (ref 0–5.6)
HDLC SERPL-MCNC: 57 MG/DL
LDLC SERPL CALC-MCNC: 144 MG/DL
NONHDLC SERPL-MCNC: 184 MG/DL
POTASSIUM SERPL-SCNC: 3.8 MMOL/L (ref 3.4–5.3)
SODIUM SERPL-SCNC: 140 MMOL/L (ref 133–144)
T4 FREE SERPL-MCNC: 0.88 NG/DL (ref 0.76–1.46)
TRIGL SERPL-MCNC: 198 MG/DL
TSH SERPL DL<=0.005 MIU/L-ACNC: 1.5 MU/L (ref 0.4–4)

## 2021-05-28 PROCEDURE — 36415 COLL VENOUS BLD VENIPUNCTURE: CPT | Performed by: FAMILY MEDICINE

## 2021-05-28 PROCEDURE — 99214 OFFICE O/P EST MOD 30 MIN: CPT | Mod: 25 | Performed by: FAMILY MEDICINE

## 2021-05-28 PROCEDURE — 80048 BASIC METABOLIC PNL TOTAL CA: CPT | Performed by: FAMILY MEDICINE

## 2021-05-28 PROCEDURE — 80061 LIPID PANEL: CPT | Performed by: FAMILY MEDICINE

## 2021-05-28 PROCEDURE — 84439 ASSAY OF FREE THYROXINE: CPT | Performed by: FAMILY MEDICINE

## 2021-05-28 PROCEDURE — 83036 HEMOGLOBIN GLYCOSYLATED A1C: CPT | Performed by: FAMILY MEDICINE

## 2021-05-28 PROCEDURE — 84443 ASSAY THYROID STIM HORMONE: CPT | Performed by: FAMILY MEDICINE

## 2021-05-28 PROCEDURE — 99396 PREV VISIT EST AGE 40-64: CPT | Performed by: FAMILY MEDICINE

## 2021-05-28 RX ORDER — SIMVASTATIN 20 MG
TABLET ORAL
Qty: 30 TABLET | Refills: 11 | Status: SHIPPED | OUTPATIENT
Start: 2021-05-28 | End: 2022-06-06

## 2021-05-28 RX ORDER — SIMVASTATIN 40 MG
TABLET ORAL
Qty: 30 TABLET | Refills: 0 | Status: CANCELLED | OUTPATIENT
Start: 2021-05-28

## 2021-05-28 RX ORDER — LEVOTHYROXINE SODIUM 50 UG/1
50 TABLET ORAL DAILY
Qty: 30 TABLET | Refills: 11 | Status: SHIPPED | OUTPATIENT
Start: 2021-05-28 | End: 2022-06-06

## 2021-05-28 RX ORDER — TRIAMCINOLONE ACETONIDE 1 MG/G
CREAM TOPICAL
Qty: 45 G | Refills: 11 | Status: SHIPPED | OUTPATIENT
Start: 2021-05-28 | End: 2023-07-31

## 2021-05-28 RX ORDER — SIMVASTATIN 40 MG
TABLET ORAL
Qty: 30 TABLET | Refills: 11 | Status: SHIPPED | OUTPATIENT
Start: 2021-05-28 | End: 2022-06-06

## 2021-05-28 ASSESSMENT — ENCOUNTER SYMPTOMS
CHILLS: 0
MYALGIAS: 0
DIARRHEA: 0
NAUSEA: 0
EYE PAIN: 0
HEARTBURN: 0
ABDOMINAL PAIN: 0
ARTHRALGIAS: 1
JOINT SWELLING: 0
COUGH: 0
FEVER: 0
DYSURIA: 0
FREQUENCY: 0
CONSTIPATION: 0
SHORTNESS OF BREATH: 0
PALPITATIONS: 0
HEMATOCHEZIA: 0
PARESTHESIAS: 0
NERVOUS/ANXIOUS: 0
HEMATURIA: 0
SORE THROAT: 0
WEAKNESS: 0
HEADACHES: 0
DIZZINESS: 0

## 2021-05-28 ASSESSMENT — ACTIVITIES OF DAILY LIVING (ADL): CURRENT_FUNCTION: NO ASSISTANCE NEEDED

## 2021-05-28 ASSESSMENT — MIFFLIN-ST. JEOR: SCORE: 1636.62

## 2021-05-28 NOTE — PROGRESS NOTES
"SUBJECTIVE:   Yusef Alvares is a 45 year old male who presents for Preventive Visit.      Patient has been advised of split billing requirements and indicates understanding: Yes   Are you in the first 12 months of your Medicare coverage?  No    Healthy Habits:     In general, how would you rate your overall health?  Excellent    Frequency of exercise:  None    Do you usually eat at least 4 servings of fruit and vegetables a day, include whole grains    & fiber and avoid regularly eating high fat or \"junk\" foods?  No    Taking medications regularly:  Yes    Medication side effects:  None    Ability to successfully perform activities of daily living:  No assistance needed    Home Safety:  No safety concerns identified    Hearing Impairment:  No hearing concerns    In the past 6 months, have you been bothered by leaking of urine?  No    In general, how would you rate your overall mental or emotional health?  Good      PHQ-2 Total Score: 0    Additional concerns today:  No    Do you feel safe in your environment? Yes    Have you ever done Advance Care Planning? (For example, a Health Directive, POLST, or a discussion with a medical provider or your loved ones about your wishes): Yes, patient states has an Advance Care Planning document and will bring a copy to the clinic.       Fall risk       Cognitive Screening   1) Repeat 3 items (Leader, Season, Table)    2) Clock draw: NORMAL  3) 3 item recall: Recalls 3 objects  Results: 3 items recalled: COGNITIVE IMPAIRMENT LESS LIKELY    Mini-CogTM Copyright S Lesa. Licensed by the author for use in [facility]; reprinted with permission (heaven@.[facility]). All rights reserved.      Do you have sleep apnea, excessive snoring or daytime drowsiness?: no    Reviewed and updated as needed this visit by clinical staff  Tobacco     Med Hx  Surg Hx  Fam Hx  Soc Hx        Reviewed and updated as needed this visit by Provider                Social History     Tobacco Use "     Smoking status: Never Smoker     Smokeless tobacco: Never Used   Substance Use Topics     Alcohol use: No     Alcohol/week: 0.0 standard drinks     If you drink alcohol do you typically have >3 drinks per day or >7 drinks per week? No    Alcohol Use 5/28/2021   Prescreen: >3 drinks/day or >7 drinks/week? -   Prescreen: >3 drinks/day or >7 drinks/week? No           Hyperlipidemia Follow-Up--patient is fasting today      Are you regularly taking any medication or supplement to lower your cholesterol?   Yes- statin    Are you having muscle aches or other side effects that you think could be caused by your cholesterol lowering medication?  No    Hypothyroidism Follow-up      Since last visit, patient describes the following symptoms: Weight stable, no hair loss, no skin changes, no constipation, no loose stools      Current providers sharing in care for this patient include:   Patient Care Team:  Yusef Katz MD as PCP - General (Family Practice)  Yusef Katz MD as Assigned PCP  Eugenie Romo MD as MD (Internal Medicine-Hematology & Oncology)  Heydi Hernandes RN as Specialty Care Coordinator (Oncology)  Eugenie Romo MD as Assigned Cancer Care Provider  Shay Cummins MD as Assigned Sleep Provider    The following health maintenance items are reviewed in Epic and correct as of today:  Health Maintenance Due   Topic Date Due     ANNUAL REVIEW OF HM ORDERS  Never done     HEPATITIS C SCREENING  Never done     MENTAL HEALTH TX PLAN  03/02/2016         Any new diagnosis of family breast, ovarian, or bowel cancer? No    FSH-7: No flowsheet data found.      Pertinent mammograms are reviewed under the imaging tab.    Review of Systems   Constitutional: Negative for chills and fever.   HENT: Negative for congestion, ear pain, hearing loss and sore throat.    Eyes: Negative for pain and visual disturbance.   Respiratory: Negative for cough and shortness of breath.    Cardiovascular: Negative for chest  "pain, palpitations and peripheral edema.   Gastrointestinal: Negative for abdominal pain, constipation, diarrhea, heartburn, hematochezia and nausea.   Genitourinary: Negative for discharge, dysuria, frequency, genital sores, hematuria, impotence and urgency.   Musculoskeletal: Positive for arthralgias. Negative for joint swelling and myalgias.   Skin: Negative for rash.   Neurological: Negative for dizziness, weakness, headaches and paresthesias.   Psychiatric/Behavioral: Negative for mood changes. The patient is not nervous/anxious.          OBJECTIVE:   /68   Pulse 83   Temp 98.7  F (37.1  C) (Tympanic)   Resp 18   Ht 1.645 m (5' 4.75\")   Wt 83.4 kg (183 lb 12.8 oz)   SpO2 94%   BMI 30.82 kg/m   Estimated body mass index is 30.82 kg/m  as calculated from the following:    Height as of this encounter: 1.645 m (5' 4.75\").    Weight as of this encounter: 83.4 kg (183 lb 12.8 oz).  Physical Exam  GENERAL: healthy, alert and no distress  EYES: Eyes grossly normal to inspection, PERRL and conjunctivae and sclerae normal  HENT: ear canals and TM's normal, nose and mouth without ulcers or lesions  NECK: no adenopathy, no asymmetry, masses, or scars and thyroid normal to palpation  RESP: lungs clear to auscultation - no rales, rhonchi or wheezes  CV: regular rate and rhythm, normal S1 S2, no S3 or S4, no murmur, click or rub, no peripheral edema and peripheral pulses strong  ABDOMEN: soft, nontender, no hepatosplenomegaly, no masses and bowel sounds normal   (male): normal male genitalia without lesions or urethral discharge, no hernia  MS: no gross musculoskeletal defects noted, no edema  SKIN: no suspicious lesions or rashes  SKIN: noted some patches of eczema, dry red patches on his hands dorsal side  NEURO: Normal strength and tone, mentation intact and speech normal  PSYCH: mentation appears normal, affect normal/bright  LYMPH: anterior cervical: no adenopathy  posterior cervical: no " "adenopathy        ASSESSMENT / PLAN:   (Z00.00) Encounter for Medicare annual wellness exam  (primary encounter diagnosis)  Comment: Discussed healthy lifestyle and preventative cares.    Plan:     (E78.5) Hyperlipidemia LDL goal <130  Comment: refilled med, check lab for stability  Plan: simvastatin (ZOCOR) 20 MG tablet, simvastatin         (ZOCOR) 40 MG tablet            (L30.9) Dermatitis  Comment: refilled med, chronic issue and stable  Plan: triamcinolone (KENALOG) 0.1 % external cream            (E03.9) Hypothyroidism, unspecified type  Comment: need to check lab prior to refill of medication  Plan:     (R73.9) Hyperglycemia  Comment: need to continue to follow with lab  Plan:     Schizophrenia, sees his specialist and stable.    Patient has been advised of split billing requirements and indicates understanding: written in avs  COUNSELING:  Reviewed preventive health counseling, as reflected in patient instructions       Regular exercise       Healthy diet/nutrition    Estimated body mass index is 30.82 kg/m  as calculated from the following:    Height as of this encounter: 1.645 m (5' 4.75\").    Weight as of this encounter: 83.4 kg (183 lb 12.8 oz).    Weight management plan: he is increasing his activity    He reports that he has never smoked. He has never used smokeless tobacco.      Appropriate preventive services were discussed with this patient, including applicable screening as appropriate for cardiovascular disease, diabetes, osteopenia/osteoporosis, and glaucoma.  As appropriate for age/gender, discussed screening for colorectal cancer, prostate cancer, breast cancer, and cervical cancer. Checklist reviewing preventive services available has been given to the patient.    Reviewed patients plan of care and provided an AVS. The Basic Care Plan (routine screening as documented in Health Maintenance) for Yusef meets the Care Plan requirement. This Care Plan has been established and reviewed with the " Patient.    Counseling Resources:  ATP IV Guidelines  Pooled Cohorts Equation Calculator  Breast Cancer Risk Calculator  Breast Cancer: Medication to Reduce Risk  FRAX Risk Assessment  ICSI Preventive Guidelines  Dietary Guidelines for Americans, 2010  USDA's MyPlate  ASA Prophylaxis  Lung CA Screening    Yusef Katz MD  [facility]    Identified Health Risks:

## 2021-05-28 NOTE — PROGRESS NOTES
"    He is at risk for lack of exercise and has been provided with information to increase physical activity for the benefit of his well-being.  The patient was counseled and encouraged to consider modifying their diet and eating habits. He was provided with information on recommended healthy diet options.  Answers for HPI/ROS submitted by the patient on 5/25/2021   Annual Exam:  In general, how would you rate your overall physical health?: excellent  Frequency of exercise:: None  Do you usually eat at least 4 servings of fruit and vegetables a day, include whole grains & fiber, and avoid regularly eating high fat or \"junk\" foods? : No  Taking medications regularly:: Yes  Medication side effects:: None  Activities of Daily Living: no assistance needed  Home safety: no safety concerns identified  Hearing Impairment:: no hearing concerns  In the past 6 months, have you been bothered by leaking of urine?: No  abdominal pain: No  Blood in stool: No  Blood in urine: No  chest pain: No  chills: No  congestion: No  constipation: No  cough: No  diarrhea: No  dizziness: No  ear pain: No  eye pain: No  nervous/anxious: No  fever: No  frequency: No  genital sores: No  headaches: No  hearing loss: No  heartburn: No  arthralgias: Yes  joint swelling: No  peripheral edema: No  mood changes: No  myalgias: No  nausea: No  dysuria: No  palpitations: No  Skin sensation changes: No  sore throat: No  urgency: No  rash: No  shortness of breath: No  visual disturbance: No  weakness: No  impotence: No  penile discharge: No  In general, how would you rate your overall mental or emotional health?: good  Additional concerns today:: No    "

## 2021-05-28 NOTE — LETTER
May 28, 2021      Yusef Alvares  [address] [facility] [place] [address]        Dear ,    We are writing to inform you of your test results.    Your have normal thyroid function.   I will get this medication refilled.   You have normal kidney function.   Your cholesterol has gone up and I recommend to recheck this lab in 3 months before make a change in your medication.    Please have a low cholesterol diet and stay active.     Resulted Orders   T4 FREE   Result Value Ref Range    T4 Free 0.88 0.76 - 1.46 ng/dL   TSH   Result Value Ref Range    TSH 1.50 0.40 - 4.00 mU/L   Lipid panel reflex to direct LDL Fasting   Result Value Ref Range    Cholesterol 241 (H) <200 mg/dL      Comment:      Desirable:       <200 mg/dl    Triglycerides 198 (H) <150 mg/dL      Comment:      Borderline high:  150-199 mg/dl  High:             200-499 mg/dl  Very high:       >499 mg/dl      HDL Cholesterol 57 >39 mg/dL    LDL Cholesterol Calculated 144 (H) <100 mg/dL      Comment:      Above desirable:  100-129 mg/dl  Borderline High:  130-159 mg/dL  High:             160-189 mg/dL  Very high:       >189 mg/dl      Non HDL Cholesterol 184 (H) <130 mg/dL      Comment:      Above Desirable:  130-159 mg/dl  Borderline high:  160-189 mg/dl  High:             190-219 mg/dl  Very high:       >219 mg/dl     Hemoglobin A1c   Result Value Ref Range    Hemoglobin A1C 5.4 0 - 5.6 %      Comment:      Normal <5.7% Prediabetes 5.7-6.4%  Diabetes 6.5% or higher - adopted from ADA   consensus guidelines.     Basic metabolic panel   Result Value Ref Range    Sodium 140 133 - 144 mmol/L    Potassium 3.8 3.4 - 5.3 mmol/L    Chloride 104 94 - 109 mmol/L    Carbon Dioxide 33 (H) 20 - 32 mmol/L    Anion Gap 3 3 - 14 mmol/L    Glucose 99 70 - 99 mg/dL    Urea Nitrogen 19 7 - 30 mg/dL    Creatinine 1.02 0.66 - 1.25 mg/dL    GFR Estimate 87 >60 mL/min/[1.73_m2]      Comment:      Non  GFR Calc  Starting 12/18/2018, serum creatinine  based estimated GFR (eGFR) will be   calculated using the Chronic Kidney Disease Epidemiology Collaboration   (CKD-EPI) equation.      GFR Estimate If Black >90 >60 mL/min/[1.73_m2]      Comment:       GFR Calc  Starting 12/18/2018, serum creatinine based estimated GFR (eGFR) will be   calculated using the Chronic Kidney Disease Epidemiology Collaboration   (CKD-EPI) equation.      Calcium 9.3 8.5 - 10.1 mg/dL       If you have any questions or concerns, please call the clinic at the number listed above.       Sincerely,      Yusef Katz MD

## 2021-05-28 NOTE — PATIENT INSTRUCTIONS
Please go to lab.    I refilled your steroid cream and cholesterol medication    I am waiting for your thyroid results to reorder that medication.    Please be aware that there will be an additional charge during your preventative visit due to either a new diagnosis and/or chronic disease management.    Preventative visits screen for diseases prior to they occur.  They do not cover for any new diagnosis or chronic disease management which would include medication refills, labs etc.    If you have questions regarding your coverage please check with your insurance provider.  At [facility] we need to code correctly to be in compliance with all insurance companies.          Thank you for choosing [facility].  You may be receiving an email and/or telephone survey request from [facility] Experience regarding your visit today.  Please take a few minutes to respond to the survey to let us know how we are doing.      If you have questions or concerns, please contact us via [facility] or you can contact your care team at [phone].    Our Clinic hours are:  Monday 6:40 am  to 7:00 pm  Tuesday -Friday 6:40 am to 5:00 pm    The [place] outpatient lab hours are:  Monday - Friday 6:10 am to 4:45 pm  Saturdays 7:00 am to 11:00 am  Appointments are required, call [phone]    If you have clinical questions after hours or would like to schedule an appointment,  call the clinic at [phone].    Patient Education   Personalized Prevention Plan  You are due for the preventive services outlined below.  Your care team is available to assist you in scheduling these services.  If you have already completed any of these items, please share that information with your care team to update in your medical record.  Health Maintenance Due   Topic Date Due     ANNUAL REVIEW OF HM ORDERS  Never done     Hepatitis C Screening  Never done     MENTAL HEALTH TX PLAN  03/02/2016       Exercise for a Healthier Heart  You may wonder how  you can improve the health of your heart. If you re thinking about exercise, you re on the right track. You don t need to become an athlete. But you do need a certain amount of brisk exercise to help strengthen your heart. If you have been diagnosed with a heart condition, your healthcare provider may advise exercise to help stabilize your condition. To help make exercise a habit, choose safe, fun activities.      Exercise with a friend. When activity is fun, you're more likely to stick with it.   Before you start  Check with your healthcare provider before starting an exercise program. This is especially important if you have not been active for a while. It's also important if you have a long-term (chronic) health problem such as heart disease, diabetes, or obesity. Or if you are at high risk for having these problems.   Why exercise?  Exercising regularly offers many healthy rewards. It can help you do all of the following:     Improve your blood cholesterol level to help prevent further heart trouble    Lower your blood pressure to help prevent a stroke or heart attack    Control diabetes, or reduce your risk of getting this disease    Improve your heart and lung function    Reach and stay at a healthy weight    Make your muscles stronger so you can stay active    Prevent falls and fractures by slowing the loss of bone mass (osteoporosis)    Manage stress better    Reduce your blood pressure    Improve your sense of self and your body image  Exercise tips      Ease into your routine. Set small goals. Then build on them. If you are not sure what your activity level should be, talk with your healthcare provider first before starting an exercise routine.    Exercise on most days. Aim for a total of 150 minutes (2 hours and 30 minutes) or more of moderate-intensity aerobic activity each week. Or 75 minutes (1 hour and 15 minutes) or more of vigorous-intensity aerobic activity each week. Or try for a combination of  both. Moderate activity means that you breathe heavier and your heart rate increases but you can still talk. Think about doing 40 minutes of moderate exercise, 3 to 4 times a week. For best results, activity should last for about 40 minutes to lower blood pressure and cholesterol. It's OK to work up to the 40-minute period over time. Examples of moderate-intensity activity are walking 1 mile in 15 minutes. Or doing 30 to 45 minutes of yard work.    Step up your daily activity level.  Along with your exercise program, try being more active the whole day. Walk instead of drive. Or park further away so that you take more steps each day. Do more household tasks or yard work. You may not be able to meet the advised mount of physical activity. But doing some moderate- or vigorous-intensity aerobic activity can help reduce your risk for heart disease. Your healthcare provider can help you figure out what is best for you.    Choose 1 or more activities you enjoy.  Walking is one of the easiest things you can do. You can also try swimming, riding a bike, dancing, or taking an exercise class.    When to call your healthcare provider  Call your healthcare provider if you have any of these:     Chest pain or feel dizzy or lightheaded    Burning, tightness, pressure, or heaviness in your chest, neck, shoulders, back, or arms    Abnormal shortness of breath    More joint or muscle pain    A very fast or irregular heartbeat (palpitations)  [employer]Well last reviewed this educational content on 7/1/2019 2000-2021 [employer]. All rights reserved. This information is not intended as a substitute for professional medical care. Always follow your healthcare professional's instructions.          Understanding USDA MyPlate  The USDA has guidelines to help you make healthy food choices. These are called MyPlate. MyPlate shows the food groups that make up healthy meals using the image of a place setting. Before you eat, think  about the healthiest choices for what to put on your plate or in your cup or bowl. To learn more about building a healthy plate, visit www.choosemyplate.gov.    The food groups    Fruits. Any fruit or 100% fruit juice counts as part of the Fruit Group. Fruits may be fresh, canned, frozen, or dried, and may be whole, cut-up, or pureed. Make 1/2 of your plate fruits and vegetables.    Vegetables. Any vegetable or 100% vegetable juice counts as a member of the Vegetable Group. Vegetables may be fresh, frozen, canned, or dried. They can be served raw or cooked and may be whole, cut-up, or mashed. Make 1/2 of your plate fruits and vegetables.    Grains. All foods made from grains are part of the Grains Group. These include wheat, rice, oats, cornmeal, and barley. Grains are often used to make foods such as bread, pasta, oatmeal, cereal, tortillas, and grits. Grains should be no more than 1/4 of your plate. At least half of your grains should be whole grains.    Protein. This group includes meat, poultry, seafood, beans and peas, eggs, processed soy products (such as tofu), nuts (including nut butters), and seeds. Make protein choices no more than 1/4 of your plate. Meat and poultry choices should be lean or low fat.    Dairy. The Dairy Group includes all fluid milk products and foods made from milk that contain calcium, such as yogurt and cheese. (Foods that have little calcium, such as cream, butter, and cream cheese, are not part of this group.) Most dairy choices should be low-fat or fat-free.    Oils. Oils aren't a food group, but they do contain essential nutrients. However it's important to watch your intake of oils. These are fats that are liquid at room temperature. They include canola, corn, olive, soybean, vegetable, and sunflower oil. Foods that are mainly oil include mayonnaise, certain salad dressings, and soft margarines. You likely already get your daily oil allowance from the foods you eat.  Things to  limit  Eating healthy also means limiting these things in your diet:       Salt (sodium). Many processed foods have a lot of sodium. To keep sodium intake down, eat fresh vegetables, meats, poultry, and seafood when possible. Purchase low-sodium, reduced-sodium, or no-salt-added food products at the store. And don't add salt to your meals at home. Instead, season them with herbs and spices such as dill, oregano, cumin, and paprika. Or try adding flavor with lemon or lime zest and juice.    Saturated fat. Saturated fats are most often found in animal products such as beef, pork, and chicken. They are often solid at room temperature, such as butter. To reduce your saturated fat intake, choose leaner cuts of meat and poultry. And try healthier cooking methods such as grilling, broiling, roasting, or baking. For a simple lower-fat swap, use plain nonfat yogurt instead of mayonnaise when making potato salad or macaroni salad.    Added sugars. These are sugars added to foods. They are in foods such as ice cream, candy, soda, fruit drinks, sports drinks, energy drinks, cookies, pastries, jams, and syrups. Cut down on added sugars by sharing sweet treats with a family member or friend. You can also choose fruit for dessert, and drink water or other unsweetened beverages.     [employer] last reviewed this educational content on 6/1/2020 2000-2021 [employer]. All rights reserved. This information is not intended as a substitute for professional medical care. Always follow your healthcare professional's instructions.

## 2021-06-02 ENCOUNTER — RECORDS - HEALTHEAST (OUTPATIENT)
Dept: ADMINISTRATIVE | Facility: CLINIC | Age: 46
End: 2021-06-02

## 2021-06-28 ENCOUNTER — ANTICOAGULATION THERAPY VISIT (OUTPATIENT)
Dept: ANTICOAGULATION | Facility: CLINIC | Age: 46
End: 2021-06-28

## 2021-06-28 DIAGNOSIS — I26.99 PULMONARY EMBOLISM (H): ICD-10-CM

## 2021-06-28 DIAGNOSIS — Z79.01 LONG TERM CURRENT USE OF ANTICOAGULANT THERAPY: ICD-10-CM

## 2021-06-28 DIAGNOSIS — I26.99 ACUTE PULMONARY EMBOLISM WITHOUT ACUTE COR PULMONALE, UNSPECIFIED PULMONARY EMBOLISM TYPE (H): ICD-10-CM

## 2021-06-28 LAB
CAPILLARY BLOOD COLLECTION: NORMAL
INR PPP: 2.3 (ref 0.86–1.14)

## 2021-06-28 PROCEDURE — 85610 PROTHROMBIN TIME: CPT | Performed by: FAMILY MEDICINE

## 2021-06-28 PROCEDURE — 36416 COLLJ CAPILLARY BLOOD SPEC: CPT | Performed by: FAMILY MEDICINE

## 2021-06-28 NOTE — PROGRESS NOTES
ANTICOAGULATION MANAGEMENT     Yusef Alvares 45 year old male is on warfarin with therapeutic INR result. (Goal INR 2.0-3.0)    Recent labs: (last 7 days)     06/28/21  0857   INR 2.30*       ASSESSMENT     Source(s): Patient/Caregiver Call       Warfarin doses taken: Warfarin taken as instructed    Diet: No new diet changes identified    New illness, injury, or hospitalization: No    Medication/supplement changes: None noted    Signs or symptoms of bleeding or clotting: No    Previous INR: Therapeutic last 2(+) visits    Additional findings: None     PLAN     Recommended plan for no diet, medication or health factor changes affecting INR     Dosing Instructions: Continue your current warfarin dose with next INR in 6 weeks       Summary  As of 6/28/2021    Full warfarin instructions:  5 mg every Sun, Thu; 7.5 mg all other days   Next INR check:  8/9/2021             Telephone call with Yusef whom verbalizes understanding and agrees to plan    Lab visit scheduled    Education provided: Contact [phone]  with any changes, questions or concerns.     Plan made per [facility] anticoagulation protocol    Sonja Stone RN  Anticoagulation Clinic  6/28/2021    _______________________________________________________________________     Anticoagulation Episode Summary     Current INR goal:  2.0-3.0   TTR:  93.5 % (1 y)   Target end date:  Indefinite   Send INR reminders to:  [facility]    Indications    Pulmonary embolism (H) [I26.99]  Long term current use of anticoagulant therapy [Z79.01]           Comments:  * PE hx. low protein C levels. Long term anticoagulation per Dr. Romo on 7-9-20         Anticoagulation Care Providers     Provider Role Specialty Phone number    Yusef Katz MD Referring Family Medicine [phone]

## 2021-06-28 NOTE — PROGRESS NOTES
Patient left a  at 11:40 AM returning a call. He states he can be reached at [phone].    Thanks,     Sonja Stone RN 06/28/21 at 11:52 AM

## 2021-06-28 NOTE — PROGRESS NOTES
VM left for pt to return call to [facility] - [phone]. Dosing instructions not given to pt. Also called home number to leave a message with woman for pt to return call to [facility].  Tentative plan: recheck INR in 6 weeks.  Thalia Sood RN on 6/28/2021 at 11:34 AM

## 2021-07-22 ENCOUNTER — TRANSFERRED RECORDS (OUTPATIENT)
Dept: HEALTH INFORMATION MANAGEMENT | Facility: CLINIC | Age: 46
End: 2021-07-22

## 2021-08-03 DIAGNOSIS — I26.99 PULMONARY EMBOLISM (H): Primary | ICD-10-CM

## 2021-08-08 ENCOUNTER — E-VISIT (OUTPATIENT)
Dept: URGENT CARE | Facility: URGENT CARE | Age: 46
End: 2021-08-08

## 2021-08-08 DIAGNOSIS — Z20.822 COVID-19 RULED OUT: Primary | ICD-10-CM

## 2021-08-08 PROCEDURE — 99421 OL DIG E/M SVC 5-10 MIN: CPT | Performed by: PHYSICIAN ASSISTANT

## 2021-08-09 ENCOUNTER — ANTICOAGULATION THERAPY VISIT (OUTPATIENT)
Dept: ANTICOAGULATION | Facility: CLINIC | Age: 46
End: 2021-08-09

## 2021-08-09 ENCOUNTER — TELEPHONE (OUTPATIENT)
Dept: FAMILY MEDICINE | Facility: CLINIC | Age: 46
End: 2021-08-09

## 2021-08-09 ENCOUNTER — LAB (OUTPATIENT)
Dept: LAB | Facility: CLINIC | Age: 46
End: 2021-08-09
Payer: COMMERCIAL

## 2021-08-09 DIAGNOSIS — I26.99 PULMONARY EMBOLISM (H): Primary | ICD-10-CM

## 2021-08-09 DIAGNOSIS — I26.99 PULMONARY EMBOLISM (H): ICD-10-CM

## 2021-08-09 DIAGNOSIS — I26.99 PULMONARY EMBOLISM WITHOUT ACUTE COR PULMONALE, UNSPECIFIED CHRONICITY, UNSPECIFIED PULMONARY EMBOLISM TYPE (H): ICD-10-CM

## 2021-08-09 DIAGNOSIS — Z79.01 LONG TERM CURRENT USE OF ANTICOAGULANT THERAPY: ICD-10-CM

## 2021-08-09 DIAGNOSIS — I26.99 ACUTE PULMONARY EMBOLISM WITHOUT ACUTE COR PULMONALE, UNSPECIFIED PULMONARY EMBOLISM TYPE (H): ICD-10-CM

## 2021-08-09 LAB — INR BLD: 2.3 (ref 0.9–1.1)

## 2021-08-09 PROCEDURE — 85610 PROTHROMBIN TIME: CPT

## 2021-08-09 PROCEDURE — 36416 COLLJ CAPILLARY BLOOD SPEC: CPT

## 2021-08-09 NOTE — TELEPHONE ENCOUNTER
ANTICOAGULATION MANAGEMENT      Yusef Alvares due for annual renewal of referral to anticoagulation monitoring. Order pended for your review and signature.      ANTICOAGULATION SUMMARY      Warfarin indication(s)     PE    Heart valve present?  NO       Current goal range   INR: 2.0-3.0     Goal appropriate for indication? Yes, INR 2-3 appropriate for hx of DVT, PE, hypercoagulable state, Afib, LVAD, or bileaflet AVR without risk factors     Current duration of therapy Indefinite/long term therapy   Time in Therapeutic Range (TTR)  (Goal > 60%) 93.5%       Office visit with referring provider's group within last year yes on 5/28/21       Thalia Sood RN

## 2021-08-09 NOTE — PROGRESS NOTES
ANTICOAGULATION MANAGEMENT     Yusef Alvares 45 year old male is on warfarin with therapeutic INR result. (Goal INR 2.0-3.0)    Recent labs: (last 7 days)     08/09/21  0854   INR 2.3*       ASSESSMENT     Source(s): Chart Review and Patient/Caregiver Call       Warfarin doses taken: Warfarin taken as instructed    Diet: No new diet changes identified    New illness, injury, or hospitalization: No    Medication/supplement changes: None noted    Signs or symptoms of bleeding or clotting: No    Previous INR: Therapeutic last 2(+) visits    Additional findings: None     PLAN     Recommended plan for no diet, medication or health factor changes affecting INR     Dosing Instructions: Continue your current warfarin dose with next INR in 6 weeks       Summary  As of 8/9/2021    Full warfarin instructions:  5 mg every Sun, Thu; 7.5 mg all other days   Next INR check:               Telephone call with Yusef who verbalizes understanding and agrees to plan    Lab visit scheduled    Education provided: Please call back if any changes to your diet, medications or how you've been taking warfarin    Plan made per [facility] anticoagulation protocol    Thalia Sood RN  Anticoagulation Clinic  8/9/2021    _______________________________________________________________________     Anticoagulation Episode Summary     Current INR goal:  2.0-3.0   TTR:  93.5 % (1 y)   Target end date:  Indefinite   Send INR reminders to:  [facility]    Indications    Pulmonary embolism (H) [I26.99]  Long term current use of anticoagulant therapy [Z79.01]           Comments:  * PE hx. low protein C levels. Long term anticoagulation per Dr. Romo on 7-9-20         Anticoagulation Care Providers     Provider Role Specialty Phone number    Yusef Katz MD Referring Family Medicine [phone]

## 2021-08-24 ENCOUNTER — VIRTUAL VISIT (OUTPATIENT)
Dept: FAMILY MEDICINE | Facility: CLINIC | Age: 46
End: 2021-08-24
Payer: COMMERCIAL

## 2021-08-24 DIAGNOSIS — R19.5 PALE STOOL: Primary | ICD-10-CM

## 2021-08-24 PROCEDURE — 99213 OFFICE O/P EST LOW 20 MIN: CPT | Mod: 95 | Performed by: FAMILY MEDICINE

## 2021-08-24 NOTE — PATIENT INSTRUCTIONS
Herminio Brambila,    I will add some labs on for next month to check liver and kidney function along with a lipase level.          Thank you for choosing [facility].  You may be receiving an email and/or telephone survey request from [facility] Customer Experience regarding your visit today.  Please take a few minutes to respond to the survey to let us know how we are doing.      If you have questions or concerns, please contact us via [employer] or you can contact your care team at [phone] option [phone].    Our Clinic hours are:  Monday - Thursday 7am-6pm  Friday 7am-5pm    The [place] outpatient lab hours are:  Monday - Friday 7am-4:30pm    Appointments are required, call [phone]    If you have clinical questions after hours or would like to schedule an appointment,  call the clinic at [phone].

## 2021-08-24 NOTE — PROGRESS NOTES
Kosta is a 45 year old who is being evaluated via a billable video visit.      How would you like to obtain your AVS? MyChart  If the video visit is dropped, the invitation should be resent by: Text to cell phone: [phone]  Will anyone else be joining your video visit? No    Video Start Time: 1052    Assessment & Plan     Pale stool  Has change in stool color.  No fever or chills.  No abdominal pain.  Stools otherwise seem to be normal.  No sticky stools.  No skin changes.  Eating a lot of soup and sandwiches.  Will check some labs at this time.    - Comprehensive metabolic panel; Future  - Lipase; Future             See Patient Instructions    No follow-ups on file.    Yusef Katz MD  [facility]    Subjective   Kosta is a 45 year old who presents for the following health issues     HPI     Concern - Pale Colored Stool  Onset: 4-5 days  Description: pale colored stools (formed)  Intensity: mild  Progression of Symptoms:  same  Accompanying Signs & Symptoms: No other symptoms.  Previous history of similar problem: No  Precipitating factors:        Worsened by: nothing  Alleviating factors:        Improved by: nothing  Therapies tried and outcome: Reports eating a lot of soups lately.  No other symptoms to report.  No constipation, no blood.        Review of Systems         Objective           Vitals:  No vitals were obtained today due to virtual visit.    Physical Exam   GENERAL: Healthy, alert and no distress  EYES: Eyes grossly normal to inspection.  No discharge or erythema, or obvious scleral/conjunctival abnormalities.  RESP: No audible wheeze, cough, or visible cyanosis.  No visible retractions or increased work of breathing.    SKIN: Visible skin clear. No significant rash, abnormal pigmentation or lesions.  NEURO: Cranial nerves grossly intact.  Mentation and speech appropriate for age.  PSYCH: Mentation appears normal, affect normal/bright, judgement and insight intact,  normal speech and appearance well-groomed.                Video-Visit Details    Type of service:  Video Visit    Video End Time:1057    Originating Location (pt. Location): Home    Distant Location (provider location):  [facility]     Platform used for Video Visit: Postini

## 2021-09-22 ENCOUNTER — LAB (OUTPATIENT)
Dept: LAB | Facility: CLINIC | Age: 46
End: 2021-09-22
Payer: COMMERCIAL

## 2021-09-22 ENCOUNTER — ANTICOAGULATION THERAPY VISIT (OUTPATIENT)
Dept: ANTICOAGULATION | Facility: CLINIC | Age: 46
End: 2021-09-22

## 2021-09-22 DIAGNOSIS — I26.99 PULMONARY EMBOLISM (H): Primary | ICD-10-CM

## 2021-09-22 DIAGNOSIS — Z79.01 LONG TERM CURRENT USE OF ANTICOAGULANT THERAPY: ICD-10-CM

## 2021-09-22 DIAGNOSIS — R19.5 PALE STOOL: ICD-10-CM

## 2021-09-22 DIAGNOSIS — I26.99 PULMONARY EMBOLISM WITHOUT ACUTE COR PULMONALE, UNSPECIFIED CHRONICITY, UNSPECIFIED PULMONARY EMBOLISM TYPE (H): ICD-10-CM

## 2021-09-22 DIAGNOSIS — I26.99 PULMONARY EMBOLISM (H): ICD-10-CM

## 2021-09-22 LAB
ALBUMIN SERPL-MCNC: 3.5 G/DL (ref 3.4–5)
ALP SERPL-CCNC: 54 U/L (ref 40–150)
ALT SERPL W P-5'-P-CCNC: 28 U/L (ref 0–70)
ANION GAP SERPL CALCULATED.3IONS-SCNC: 5 MMOL/L (ref 3–14)
AST SERPL W P-5'-P-CCNC: 18 U/L (ref 0–45)
BILIRUB SERPL-MCNC: 1.5 MG/DL (ref 0.2–1.3)
BUN SERPL-MCNC: 17 MG/DL (ref 7–30)
CALCIUM SERPL-MCNC: 8.5 MG/DL (ref 8.5–10.1)
CHLORIDE BLD-SCNC: 105 MMOL/L (ref 94–109)
CO2 SERPL-SCNC: 30 MMOL/L (ref 20–32)
CREAT SERPL-MCNC: 0.95 MG/DL (ref 0.66–1.25)
GFR SERPL CREATININE-BSD FRML MDRD: >90 ML/MIN/1.73M2
GLUCOSE BLD-MCNC: 95 MG/DL (ref 70–99)
INR BLD: 2.3 (ref 0.9–1.1)
LIPASE SERPL-CCNC: 159 U/L (ref 73–393)
POTASSIUM BLD-SCNC: 3.7 MMOL/L (ref 3.4–5.3)
PROT SERPL-MCNC: 6.9 G/DL (ref 6.8–8.8)
SODIUM SERPL-SCNC: 140 MMOL/L (ref 133–144)

## 2021-09-22 PROCEDURE — 80053 COMPREHEN METABOLIC PANEL: CPT

## 2021-09-22 PROCEDURE — 83690 ASSAY OF LIPASE: CPT

## 2021-09-22 PROCEDURE — 36416 COLLJ CAPILLARY BLOOD SPEC: CPT

## 2021-09-22 PROCEDURE — 85610 PROTHROMBIN TIME: CPT

## 2021-09-22 PROCEDURE — 36415 COLL VENOUS BLD VENIPUNCTURE: CPT

## 2021-09-22 NOTE — LETTER
September 24, 2021      Yusef MARSHALL Dia  [address] [address]        Dear ,    We are writing to inform you of your test results.    You have normal kidney and liver function.  Your bilirubin is only minimally elevated and this is not concerning.  You have normal lipase and no signs of pancreatitis.    Resulted Orders   Lipase   Result Value Ref Range    Lipase 159 73 - 393 U/L   Comprehensive metabolic panel   Result Value Ref Range    Sodium 140 133 - 144 mmol/L    Potassium 3.7 3.4 - 5.3 mmol/L    Chloride 105 94 - 109 mmol/L    Carbon Dioxide (CO2) 30 20 - 32 mmol/L    Anion Gap 5 3 - 14 mmol/L    Urea Nitrogen 17 7 - 30 mg/dL    Creatinine 0.95 0.66 - 1.25 mg/dL    Calcium 8.5 8.5 - 10.1 mg/dL    Glucose 95 70 - 99 mg/dL    Alkaline Phosphatase 54 40 - 150 U/L    AST 18 0 - 45 U/L    ALT 28 0 - 70 U/L    Protein Total 6.9 6.8 - 8.8 g/dL    Albumin 3.5 3.4 - 5.0 g/dL    Bilirubin Total 1.5 (H) 0.2 - 1.3 mg/dL    GFR Estimate >90 >60 mL/min/1.73m2      Comment:      As of July 11, 2021, eGFR is calculated by the CKD-EPI creatinine equation, without race adjustment. eGFR can be influenced by muscle mass, exercise, and diet. The reported eGFR is an estimation only and is only applicable if the renal function is stable.       If you have any questions or concerns, please call the clinic at the number listed above.       Sincerely,      Yusef Katz MD

## 2021-09-22 NOTE — PROGRESS NOTES
ANTICOAGULATION MANAGEMENT     Yusef Alvares 45 year old male is on warfarin with therapeutic INR result. (Goal INR 2.0-3.0)    Recent labs: (last 7 days)     09/22/21  1018   INR 2.3*       ASSESSMENT     Source(s): Patient/Caregiver Call       Warfarin doses taken: Warfarin taken as instructed    Diet: No new diet changes identified    New illness, injury, or hospitalization: No    Medication/supplement changes: None noted    Signs or symptoms of bleeding or clotting: No    Previous INR: Therapeutic last 2(+) visits    Additional findings: C/o dizziness a week and a half ago when he woke up. Advised patient if it happens again to make his PCP aware.     PLAN     Recommended plan for no diet, medication or health factor changes affecting INR     Dosing Instructions: Continue your current warfarin dose with next INR in 6 weeks       Summary  As of 9/22/2021    Full warfarin instructions:  5 mg every Sun, Thu; 7.5 mg all other days   Next INR check:  11/3/2021             Telephone call with Yusef who verbalizes understanding and agrees to plan    Lab visit scheduled    Education provided: Please call back if any changes to your diet, medications or how you've been taking warfarin and Contact [phone]  with any changes, questions or concerns.     Plan made per [facility] anticoagulation protocol    Jordan Norman RN  Anticoagulation Clinic  9/22/2021    _______________________________________________________________________     Anticoagulation Episode Summary     Current INR goal:  2.0-3.0   TTR:  93.5 % (1 y)   Target end date:  Indefinite   Send INR reminders to:  [facility]    Indications    Pulmonary embolism (H) [I26.99]  Long term current use of anticoagulant therapy [Z79.01]  Pulmonary embolism without acute cor pulmonale  unspecified chronicity  unspecified pulmonary embolism type (H) [I26.99]           Comments:  * PE hx. low protein C levels. Long term anticoagulation per Dr. Romo on 7-9-20          Anticoagulation Care Providers     Provider Role Specialty Phone number    Yusef Katz MD Referring Family Medicine [phone]

## 2021-10-02 ENCOUNTER — HEALTH MAINTENANCE LETTER (OUTPATIENT)
Age: 46
End: 2021-10-02

## 2021-10-27 ENCOUNTER — TRANSFERRED RECORDS (OUTPATIENT)
Dept: HEALTH INFORMATION MANAGEMENT | Facility: CLINIC | Age: 46
End: 2021-10-27
Payer: COMMERCIAL

## 2021-11-03 ENCOUNTER — ANTICOAGULATION THERAPY VISIT (OUTPATIENT)
Dept: FAMILY MEDICINE | Facility: CLINIC | Age: 46
End: 2021-11-03

## 2021-11-03 ENCOUNTER — LAB (OUTPATIENT)
Dept: LAB | Facility: CLINIC | Age: 46
End: 2021-11-03
Payer: COMMERCIAL

## 2021-11-03 DIAGNOSIS — I26.99 PULMONARY EMBOLISM WITHOUT ACUTE COR PULMONALE, UNSPECIFIED CHRONICITY, UNSPECIFIED PULMONARY EMBOLISM TYPE (H): ICD-10-CM

## 2021-11-03 DIAGNOSIS — I26.99 PULMONARY EMBOLISM (H): ICD-10-CM

## 2021-11-03 DIAGNOSIS — Z79.01 LONG TERM CURRENT USE OF ANTICOAGULANT THERAPY: ICD-10-CM

## 2021-11-03 DIAGNOSIS — I26.99 PULMONARY EMBOLISM (H): Primary | ICD-10-CM

## 2021-11-03 DIAGNOSIS — I26.99 ACUTE PULMONARY EMBOLISM WITHOUT ACUTE COR PULMONALE, UNSPECIFIED PULMONARY EMBOLISM TYPE (H): ICD-10-CM

## 2021-11-03 LAB — INR BLD: 3.5 (ref 0.9–1.1)

## 2021-11-03 PROCEDURE — 85610 PROTHROMBIN TIME: CPT

## 2021-11-03 PROCEDURE — 36416 COLLJ CAPILLARY BLOOD SPEC: CPT

## 2021-11-03 RX ORDER — WARFARIN SODIUM 5 MG/1
TABLET ORAL
Qty: 120 TABLET | Refills: 0 | COMMUNITY
Start: 2021-11-03 | End: 2021-12-27

## 2021-11-03 NOTE — PROGRESS NOTES
ANTICOAGULATION MANAGEMENT     Yusef Alvares 46 year old male is on warfarin with supratherapeutic INR result. (Goal INR 2.0-3.0)    Recent labs: (last 7 days)     11/03/21  0831   INR 3.5*       ASSESSMENT     Source(s): Chart Review and Patient/Caregiver Call       Warfarin doses taken: Warfarin taken as instructed    Diet: No new diet changes identified    New illness, injury, or hospitalization: No    Medication/supplement changes: None noted    Signs or symptoms of bleeding or clotting: No    Previous INR: Therapeutic last 2(+) visits    Additional findings: None     PLAN     Recommended plan for no diet, medication or health factor changes affecting INR     Dosing Instructions: (already took dose today) Tomorrow Hold dose then Decrease your warfarin dose (5.3% change) with next INR in 2 weeks       Summary  As of 11/3/2021    Full warfarin instructions:  11/4: Hold; Otherwise 5 mg every Sun, Tue, Thu; 7.5 mg all other days   Next INR check:  11/17/2021             Telephone call with Yusef who verbalizes understanding and agrees to plan    Lab visit scheduled    Education provided: Please call back if any changes to your diet, medications or how you've been taking warfarin and Monitoring for bleeding signs and symptoms    Plan made per [facility] anticoagulation protocol    Thalia Sood RN  Anticoagulation Clinic  11/3/2021    _______________________________________________________________________     Anticoagulation Episode Summary     Current INR goal:  2.0-3.0   TTR:  92.6 % (1 y)   Target end date:  Indefinite   Send INR reminders to:  [facility]    Indications    Pulmonary embolism (H) [I26.99]  Long term current use of anticoagulant therapy [Z79.01]  Pulmonary embolism without acute cor pulmonale  unspecified chronicity  unspecified pulmonary embolism type (H) [I26.99]           Comments:  * PE hx. low protein C levels. Long term anticoagulation per Dr. Romo on 7-9-20         Anticoagulation Care  Providers     Provider Role Specialty Phone number    Yusef Katz MD Referring Family Medicine [phone]

## 2021-11-17 ENCOUNTER — MYC MEDICAL ADVICE (OUTPATIENT)
Dept: FAMILY MEDICINE | Facility: CLINIC | Age: 46
End: 2021-11-17
Payer: COMMERCIAL

## 2021-11-18 ENCOUNTER — E-VISIT (OUTPATIENT)
Dept: URGENT CARE | Facility: URGENT CARE | Age: 46
End: 2021-11-18
Payer: COMMERCIAL

## 2021-11-18 DIAGNOSIS — Z20.822 SUSPECTED COVID-19 VIRUS INFECTION: Primary | ICD-10-CM

## 2021-11-18 PROCEDURE — 99421 OL DIG E/M SVC 5-10 MIN: CPT | Performed by: PHYSICIAN ASSISTANT

## 2021-11-18 RX ORDER — ALBUTEROL SULFATE 90 UG/1
2 AEROSOL, METERED RESPIRATORY (INHALATION) EVERY 4 HOURS PRN
Qty: 18 G | Refills: 0 | Status: SHIPPED | OUTPATIENT
Start: 2021-11-18 | End: 2022-07-18

## 2021-11-18 NOTE — PATIENT INSTRUCTIONS
Dear Yusef Alvares,    Your symptoms show that you may have coronavirus (COVID-19). This illness can cause fever, cough and trouble breathing. Many people get a mild case and get better on their own. Some people can get very sick.    Will I be tested for COVID-19?  We would like to test you for Covid-19 virus. I have placed orders for this test.     To schedule: go to your [employer] home page and scroll down to the section that says  You have an appointment that needs to be scheduled  and click the large green button that says  Schedule Now  and follow the steps to find the next available openings.    If you are unable to complete these [employer] scheduling steps, please call [phone] to schedule your testing.     Return to work/school/ guidance:  Please let your workplace manager and staffing office know when your quarantine ends     We can t give you an exact date as it depends on the above. You can calculate this on your own or work with your manager/staffing office to calculate this. (For example if you were exposed on 10/4, you would have to quarantine for 14 full days. That would be through 10/18. You could return on 10/19.)      If you receive a positive COVID-19 test result, follow the guidance of the those who are giving you the results. Usually the return to work is 10 (or in some cases 20 days from symptom onset.) If you work at [employer], you must also be cleared by Employee Occupational Health and Safety to return to work.        If you receive a negative COVID-19 test result and did not have a high risk exposure to someone with a known positive COVID-19 test, you can return to work once you're free of fever for 24 hours without fever-reducing medication and your symptoms are improving or resolved.      If you receive a negative COVID-19 test and If you had a high risk exposure to someone who has tested positive for COVID-19 then you can return to work 14 days after your last contact  with the positive individual    Note: If you have ongoing exposure to the covid positive person, this quarantine period may be more than 14 days. (For example, if you are continued to be exposed to your child who tested positive and cannot isolate from them, then the quarantine of 7-14 days can't start until your child is no longer contagious. This is typically 10 days from onset of the child's symptoms. So the total duration may be 17-24 days in this case.)    Sign up for [employer].   We know it's scary to hear that you might have COVID-19. We want to track your symptoms to make sure you're okay over the next 2 weeks. Please look for an email from [employer]--this is a free, online program that we'll use to keep in touch. To sign up, follow the link in the email you will receive. Learn more at [email or web address]    How can I take care of myself?    Get lots of rest. Drink extra fluids (unless a doctor has told you not to)    Take Tylenol (acetaminophen) or ibuprofen for fever or pain. If you have liver or kidney problems, ask your family doctor if it's okay to take Tylenol o ibuprofen    If you have other health problems (like cancer, heart failure, an organ transplant or severe kidney disease): Call your specialty clinic if you don't feel better in the next 2 days.    Know when to call 911. Emergency warning signs include:  o Trouble breathing or shortness of breath  o Pain or pressure in the chest that doesn't go away  o Feeling confused like you haven't felt before, or not being able to wake up  o Bluish-colored lips or face    Where can I get more information?  [facility] - About COVID-19:   [email or web address]    CDC - What to Do If You're Sick:   www.cdc.gov/coronavirus/2019-ncov/about/steps-when-sick.html    November 18, 2021  RE:  Yusef Alvares                                                                                                                  [address]  [place] [address]      To whom it may concern:    I evaluated Yusef Alvares on November 18, 2021. Yusef Alvares should be excused from work/school.     They should let their workplace manager and staffing office know when their quarantine ends.    We can not give an exact date as it depends on the information below. They can calculate this on their own or work with their manager/staffing office to calculate this. (For example if they were exposed on 10/04, they would have to quarantine for 14 full days. That would be through 10/18. They could return on 10/19.)    Quarantine Guidelines:      If patient receives a positive COVID-19 test result, they should follow the guidance of those who are giving the results. Usually the return to work is 10 (or in some cases 20 days from symptom onset.) If they work at [employer], they must be cleared by Employee Occupational Health and Safety to return to work.        If patient receives a negative COVID-19 test result and did not have a high risk exposure to someone with a known positive COVID-19 test, they can return to work once they're free of fever for 24 hours without fever-reducing medication and their symptoms are improving or resolved.      If patient receives a negative COVID-19 test and if they had a high risk exposure to someone who has tested positive for COVID-19 then they can return to work 14 days after their last contact with the positive individual    Note: If there is ongoing exposure to the covid positive person, this quarantine period may be longer than 14 days. (For example, if they are continually exposed to their child, who tested positive and cannot isolate from them, then the quarantine of 7-14 days can't start until their child is no longer contagious. This is typically 10 days from onset to the child's symptoms. So the total duration may be 17-24 days in this case.)     Sincerely,  Dk Miranda PA-C

## 2021-11-18 NOTE — TELEPHONE ENCOUNTER
Writer noted that lab appointment has been cancelled, e-visit pending with Dr. Katz. Writer called patient, and advised him to request an e-visit with first available provider instead, as Dr. Katz is out of office until next week. He verbalized understanding.     Rosio Renee RN  [facility]

## 2021-11-21 ENCOUNTER — LAB (OUTPATIENT)
Dept: FAMILY MEDICINE | Facility: CLINIC | Age: 46
End: 2021-11-21
Attending: PHYSICIAN ASSISTANT
Payer: COMMERCIAL

## 2021-12-02 ENCOUNTER — LAB (OUTPATIENT)
Dept: LAB | Facility: CLINIC | Age: 46
End: 2021-12-02
Payer: COMMERCIAL

## 2021-12-02 ENCOUNTER — ANTICOAGULATION THERAPY VISIT (OUTPATIENT)
Dept: ANTICOAGULATION | Facility: CLINIC | Age: 46
End: 2021-12-02

## 2021-12-02 DIAGNOSIS — I26.99 PULMONARY EMBOLISM (H): Primary | ICD-10-CM

## 2021-12-02 DIAGNOSIS — I26.99 PULMONARY EMBOLISM WITHOUT ACUTE COR PULMONALE, UNSPECIFIED CHRONICITY, UNSPECIFIED PULMONARY EMBOLISM TYPE (H): ICD-10-CM

## 2021-12-02 DIAGNOSIS — I26.99 PULMONARY EMBOLISM (H): ICD-10-CM

## 2021-12-02 DIAGNOSIS — Z79.01 LONG TERM CURRENT USE OF ANTICOAGULANT THERAPY: ICD-10-CM

## 2021-12-02 LAB — INR BLD: 2.7 (ref 0.9–1.1)

## 2021-12-02 PROCEDURE — 85610 PROTHROMBIN TIME: CPT

## 2021-12-02 PROCEDURE — 36416 COLLJ CAPILLARY BLOOD SPEC: CPT

## 2021-12-02 NOTE — PROGRESS NOTES
ANTICOAGULATION MANAGEMENT     Yusef Alvares 46 year old male is on warfarin with therapeutic INR result. (Goal INR 2.0-3.0)    Recent labs: (last 7 days)     12/02/21  1415   INR 2.7*       ASSESSMENT     Source(s): Chart Review and Patient/Caregiver Call       Warfarin doses taken: Warfarin taken as instructed    Diet: No new diet changes identified    New illness, injury, or hospitalization: Yes: dx with COVID on 11/21/21 (mild sx started 11/11/21)    Medication/supplement changes: None noted - However, Albuterol rx by PCP - patient doesn't feel he needs it so hasn't picked it up yet. Denies any breathing trouble or wheezing sx.    Signs or symptoms of bleeding or clotting: No    Previous INR: Supratherapeutic    Additional findings: None     PLAN     Recommended plan for temporary change(s) affecting INR     Dosing Instructions: Continue your current warfarin dose with next INR in 4 weeks       Summary  As of 12/2/2021    Full warfarin instructions:  5 mg every Sun, Tue, Thu; 7.5 mg all other days   Next INR check:  12/30/2021             Telephone call with Yusef who verbalizes understanding and agrees to plan    Lab visit scheduled    Education provided: Please call back if any changes to your diet, medications or how you've been taking warfarin, Monitoring for bleeding signs and symptoms, Monitoring for clotting signs and symptoms and Importance of notifying clinic for changes in medications; a sooner lab recheck maybe needed.    Plan made per [facility] anticoagulation protocol    Danielle Castanon, RN  Anticoagulation Clinic  12/2/2021    _______________________________________________________________________     Anticoagulation Episode Summary     Current INR goal:  2.0-3.0   TTR:  90.2 % (1 y)   Target end date:  Indefinite   Send INR reminders to:  [facility]    Indications    Pulmonary embolism (H) [I26.99]  Long term current use of anticoagulant therapy [Z79.01]  Pulmonary embolism without acute cor  pulmonale  unspecified chronicity  unspecified pulmonary embolism type (H) [I26.99]           Comments:  * PE hx. low protein C levels. Long term anticoagulation per Dr. Romo on 7-9-20         Anticoagulation Care Providers     Provider Role Specialty Phone number    Yusef Katz MD Referring Family Medicine [phone]

## 2021-12-20 ENCOUNTER — NURSE TRIAGE (OUTPATIENT)
Dept: NURSING | Facility: CLINIC | Age: 46
End: 2021-12-20
Payer: COMMERCIAL

## 2021-12-20 ENCOUNTER — MYC MEDICAL ADVICE (OUTPATIENT)
Dept: FAMILY MEDICINE | Facility: CLINIC | Age: 46
End: 2021-12-20
Payer: COMMERCIAL

## 2021-12-20 NOTE — TELEPHONE ENCOUNTER
"Patient calling, and states someone was in his home , and he is unsure if he has been vaccinated or if he has covid. He is asking about quarantining. He was advised to call the person, and ask him if he has been ill. Or if he has been vaxxed.    Lulu Hawthorne RN COVID 19 Nurse Triage Plan/Patient Instructions    Please be aware that novel coronavirus (COVID-19) may be circulating in the community. If you develop symptoms such as fever, cough, or SOB or if you have concerns about the presence of another infection including coronavirus (COVID-19), please contact your health care provider or visit [email or web address].     Disposition/Instructions    Home care recommended. Follow home care protocol based instructions.  Additional COVID19 information to add for patients.   How can I protect others?  If you have symptoms (fever, cough, body aches or trouble breathing): Stay home and away from others (self-isolate) until:    At least 10 days have passed since your symptoms started, And     You ve had no fever--and no medicine that reduces fever--for 1 full day (24 hours), And      Your other symptoms have resolved (gotten better).     If you don t have symptoms, but a test showed that you have COVID-19 (you tested positive):    Stay home and away from others (self-isolate). Follow the tips under \"How do I self-isolate?\" below for 10 days (20 days if you have a weak immune system).    You don't need to be retested for COVID-19 before going back to school or work. As long as you're fever-free and feeling better, you can go back to school, work and other activities after waiting the 10 or 20 days.     How do I self-isolate?    Stay in your own room, even for meals. Use your own bathroom if you can.     Stay away from others in your home. No hugging, kissing or shaking hands. No visitors.    Don t go to work, school or anywhere else.     Clean  high touch  surfaces often (doorknobs, counters, handles, etc.). Use a " household cleaning spray or wipes. You ll find a full list on the EPA website:  www.epa.gov/pesticide-registration/list-n-disinfectants-use-against-sars-cov-2.    Cover your mouth and nose with a mask, tissue or washcloth to avoid spreading germs.    Wash your hands and face often. Use soap and water.    Caregivers in these groups are at risk for severe illness due to COVID-19:  o People 65 years and older  o People who live in a nursing home or long-term care facility  o People with chronic disease (lung, heart, cancer, diabetes, kidney, liver, immunologic)  o People who have a weakened immune system, including those who:  - Are in cancer treatment  - Take medicine that weakens the immune system, such as corticosteroids  - Had a bone marrow or organ transplant  - Have an immune deficiency  - Have poorly controlled HIV or AIDS  - Are obese (body mass index of 40 or higher)  - Smoke regularly    Caregivers should wear gloves while washing dishes, handling laundry and cleaning bedrooms and bathrooms.    Use caution when washing and drying laundry: Don t shake dirty laundry, and use the warmest water setting that you can.    For more tips, go to www.cdc.gov/coronavirus/2019-ncov/downloads/10Things.pdf.    How can I take care of myself?  1. Get lots of rest. Drink extra fluids (unless a doctor has told you not to).     2. Take Tylenol (acetaminophen) for fever or pain. If you have liver or kidney problems, ask your family doctor if it s okay to take Tylenol.     Adults can take either:     650 mg (two 325 mg pills) every 4 to 6 hours, or     1,000 mg (two 500 mg pills) every 8 hours as needed.     Note: Don t take more than 3,000 mg in one day.   Acetaminophen is found in many medicines (both prescribed and over-the-counter medicines). Read all labels to be sure you don t take too much.     For children, check the Tylenol bottle for the right dose. The dose is based on the child s age or weight.    3. If you have other  health problems (like cancer, heart failure, an organ transplant or severe kidney disease): Call your specialty clinic if you don t feel better in the next 2 days.    4. Know when to call 911: Emergency warning signs include:    Trouble breathing or shortness of breath    Pain or pressure in the chest that doesn t go away    Feeling confused like you haven t felt before, or not being able to wake up    Bluish-colored lips or face    What are the symptoms of COVID-19?     The most common symptoms are cough, fever and trouble breathing.     Less common symptoms include body aches, chills, diarrhea (loose, watery poops), fatigue (feeling very tired), headache, runny nose, sore throat and loss of smell.    COVID-19 can cause severe coughing (bronchitis) and lung infection (pneumonia).    How does it spread?     The virus may spread when a person coughs or sneezes into the air. The virus can travel about 6 feet this way, and it can live on surfaces.      Common  (household disinfectants) will kill the virus.    Who is at risk?  Anyone can catch COVID-19 if they re around someone who has the virus.    How can others protect themselves?     Stay away from people who have COVID-19 (or symptoms of COVID-19).    Wash hands often with soap and water. Or, use hand  with at least 60% alcohol.    Avoid touching the eyes, nose or mouth.     Wear a face mask when you go out in public, when sick or when caring for a sick person.    Where can I get more information?    [facility]: About COVID-19: [email or web address]    CDC: What to Do If You re Sick: www.cdc.gov/coronavirus/2019-ncov/about/steps-when-sick.html    CDC: Ending Home Isolation: www.cdc.gov/coronavirus/2019-ncov/hcp/disposition-in-home-patients.html     CDC: Caring for Someone: www.cdc.gov/coronavirus/2019-ncov/if-you-are-sick/care-for-someone.html     MD: Interim Guidance for Hospital Discharge to Home:  [email or web address]/[email or web address]/hcp/hospdischarge.pdf    [facility] clinical trials (COVID-19 research studies): [email or web address]-clinical-trials     Below are the COVID-19 hotlines at the [place] Department of Health ([facility]). Interpreters are available.   o For health questions: Call 994-823-7612 or 1-707.464.4612 (7 a.m. to 7 p.m.)  o For questions about schools and childcare: Call 418-931-0833 or 1-545.593.3543 (7 a.m. to 7 p.m.)          Thank you for taking steps to prevent the spread of this virus.  o Limit your contact with others.  o Wear a simple mask to cover your cough.  o Wash your hands well and often.    Resources    [facility]: About COVID-19: [email or web address]    CDC: What to Do If You're Sick: www.cdc.gov/coronavirus/2019-ncov/about/steps-when-sick.html    CDC: Ending Home Isolation: www.cdc.gov/coronavirus/2019-ncov/hcp/disposition-in-home-patients.html     CDC: Caring for Someone: www.cdc.gov/coronavirus/2019-ncov/if-you-are-sick/care-for-someone.html     [facility]: Interim Guidance for Hospital Discharge to Home: [email or web address]./diseases/coronavirus/hcp/hospdischarge.pdf    [facility] clinical trials (COVID-19 research studies): [email or web address].[email or web address]-clinical-trials     Below are the COVID-19 hotlines at the [place] Department [facility] ([facility]). Interpreters are available.   o For health questions: Call 955-869-5057 or 1-171.368.4363 (7 a.m. to 7 p.m.)  o For questions about schools and childcare: Call 103-753-7044 or 1-130.315.6387 (7 a.m. to 7 p.m.)                   [facility] Triage/refill nurse

## 2021-12-27 ENCOUNTER — TELEPHONE (OUTPATIENT)
Dept: FAMILY MEDICINE | Facility: CLINIC | Age: 46
End: 2021-12-27
Payer: COMMERCIAL

## 2021-12-27 DIAGNOSIS — I26.99 PULMONARY EMBOLISM (H): ICD-10-CM

## 2021-12-27 DIAGNOSIS — Z79.01 LONG TERM CURRENT USE OF ANTICOAGULANT THERAPY: ICD-10-CM

## 2021-12-27 DIAGNOSIS — I26.99 PULMONARY EMBOLISM WITHOUT ACUTE COR PULMONALE, UNSPECIFIED CHRONICITY, UNSPECIFIED PULMONARY EMBOLISM TYPE (H): ICD-10-CM

## 2021-12-27 DIAGNOSIS — I26.99 ACUTE PULMONARY EMBOLISM WITHOUT ACUTE COR PULMONALE, UNSPECIFIED PULMONARY EMBOLISM TYPE (H): ICD-10-CM

## 2021-12-27 RX ORDER — WARFARIN SODIUM 5 MG/1
TABLET ORAL
Qty: 120 TABLET | Refills: 0 | Status: SHIPPED | OUTPATIENT
Start: 2021-12-27 | End: 2022-03-08

## 2021-12-27 NOTE — TELEPHONE ENCOUNTER
Writer spoke with patient to clarify message below. He has not missed any doses of warfarin, but he is running low and was unsure if he would be able to  his refill tomorrow. A refill of warfarin was sent to his Pharmacy this morning. He states he has his  helping him tomorrow and most likely will have no problem getting his prescription. He normally takes his warfarin dose right away in the morning. Advised that it is okay to take it a little later than he usually does, as long as he gets his dose in that day. If for some reason he is unable to  his prescription at all tomorrow, he will notify [facility]. No further questions/concerns at this time.     Sonja Stone RN 12/27/21 at 5:22 PM

## 2021-12-27 NOTE — TELEPHONE ENCOUNTER
Current warfarin dose:  Warfarin maintenance plan:  5 mg (5 mg x 1) every Sun, Tue, Thu; 7.5 mg (5 mg x 1.5) all other days   Weekly warfarin total:  45 mg       Last office visit: 5/28/2021    Last INR result:  INR   Date Value Ref Range Status   12/02/2021 2.7 (H) 0.9 - 1.1 Final   06/28/2021 2.30 (H) 0.86 - 1.14 Final     Comment:     This test is intended for monitoring Coumadin therapy.  Results are not   accurate in patients with prolonged INR due to factor deficiency.         Refill authorized per [facility] protocol. Writer spoke with patient.    ANDRADE Stone RN BSN

## 2021-12-27 NOTE — TELEPHONE ENCOUNTER
Reason for Call:  Other prescription    Detailed comments: patient states he received a call from [facility] regarding his medication being discontinued. He only has a couple of doses left.    Phone Number Patient can be reached at: [phone]    Best Time: any    Can we leave a detailed message on this number? YES    Call taken on 12/27/2021 at 8:20 AM by Renata Saenz

## 2021-12-27 NOTE — TELEPHONE ENCOUNTER
Should he take 11.5mg on wed to make up for the doses missed until then??  Please advise    Call him at  please  Елена Hartmann on 12/27/2021 at 4:33 PM

## 2021-12-31 ENCOUNTER — LAB (OUTPATIENT)
Dept: LAB | Facility: CLINIC | Age: 46
End: 2021-12-31
Payer: COMMERCIAL

## 2021-12-31 ENCOUNTER — ANTICOAGULATION THERAPY VISIT (OUTPATIENT)
Dept: ANTICOAGULATION | Facility: CLINIC | Age: 46
End: 2021-12-31

## 2021-12-31 DIAGNOSIS — I26.99 PULMONARY EMBOLISM (H): ICD-10-CM

## 2021-12-31 DIAGNOSIS — I26.99 PULMONARY EMBOLISM WITHOUT ACUTE COR PULMONALE, UNSPECIFIED CHRONICITY, UNSPECIFIED PULMONARY EMBOLISM TYPE (H): ICD-10-CM

## 2021-12-31 DIAGNOSIS — I26.99 PULMONARY EMBOLISM (H): Primary | ICD-10-CM

## 2021-12-31 DIAGNOSIS — Z79.01 LONG TERM CURRENT USE OF ANTICOAGULANT THERAPY: ICD-10-CM

## 2021-12-31 LAB — INR BLD: 3.1 (ref 0.9–1.1)

## 2021-12-31 PROCEDURE — 36416 COLLJ CAPILLARY BLOOD SPEC: CPT

## 2021-12-31 PROCEDURE — 85610 PROTHROMBIN TIME: CPT

## 2021-12-31 NOTE — PROGRESS NOTES
ANTICOAGULATION MANAGEMENT     Yusef Alvares 46 year old male is on warfarin with supratherapeutic INR result. (Goal INR 2.0-3.0)    Recent labs: (last 7 days)     12/31/21  0916   INR 3.1*       ASSESSMENT     Source(s): Patient/Caregiver Call       Warfarin doses taken: Warfarin taken as instructed    Diet: No new diet changes identified    New illness, injury, or hospitalization: No    Medication/supplement changes: None noted    Signs or symptoms of bleeding or clotting: No    Previous INR: Therapeutic last visit; previously outside of goal range    Additional findings: None     PLAN     Recommended plan for no diet, medication or health factor changes affecting INR     Dosing Instructions: Partial hold then continue your current warfarin dose with next INR in 2 weeks       Summary  As of 12/31/2021    Full warfarin instructions:  12/31: 5 mg; Otherwise 5 mg every Sun, Tue, Thu; 7.5 mg all other days   Next INR check:  1/14/2022             Telephone call with Yusef who verbalizes understanding and agrees to plan    Lab visit scheduled    Education provided: Please call back if any changes to your diet, medications or how you've been taking warfarin, Monitoring for bleeding signs and symptoms, When to seek medical attention/emergency care and Contact [phone]  with any changes, questions or concerns.     Plan made per [facility] anticoagulation protocol    Jordan Norman RN  Anticoagulation Clinic  12/31/2021    _______________________________________________________________________     Anticoagulation Episode Summary     Current INR goal:  2.0-3.0   TTR:  88.2 % (1 y)   Target end date:  Indefinite   [facility]    Indications    Pulmonary embolism (H) [I26.99]  Long term current use of anticoagulant therapy [Z79.01]  Pulmonary embolism without acute cor pulmonale  unspecified chronicity  unspecified pulmonary embolism type (H) [I26.99]           Comments:  * PE hx. low protein  C levels. Long term anticoagulation per Dr. Romo on 7-9-20         Anticoagulation Care Providers     Provider Role Specialty Phone number    Yusef Katz MD Referring Family Medicine [phone]

## 2022-01-06 ENCOUNTER — TRANSFERRED RECORDS (OUTPATIENT)
Dept: HEALTH INFORMATION MANAGEMENT | Facility: CLINIC | Age: 47
End: 2022-01-06
Payer: COMMERCIAL

## 2022-01-20 ENCOUNTER — LAB (OUTPATIENT)
Dept: LAB | Facility: CLINIC | Age: 47
End: 2022-01-20
Payer: COMMERCIAL

## 2022-01-20 ENCOUNTER — ANTICOAGULATION THERAPY VISIT (OUTPATIENT)
Dept: ANTICOAGULATION | Facility: CLINIC | Age: 47
End: 2022-01-20

## 2022-01-20 DIAGNOSIS — I26.99 PULMONARY EMBOLISM WITHOUT ACUTE COR PULMONALE, UNSPECIFIED CHRONICITY, UNSPECIFIED PULMONARY EMBOLISM TYPE (H): ICD-10-CM

## 2022-01-20 DIAGNOSIS — I26.99 PULMONARY EMBOLISM (H): Primary | ICD-10-CM

## 2022-01-20 DIAGNOSIS — I26.99 PULMONARY EMBOLISM (H): ICD-10-CM

## 2022-01-20 DIAGNOSIS — Z79.01 LONG TERM CURRENT USE OF ANTICOAGULANT THERAPY: ICD-10-CM

## 2022-01-20 LAB — INR BLD: 2.7 (ref 0.9–1.1)

## 2022-01-20 PROCEDURE — 36416 COLLJ CAPILLARY BLOOD SPEC: CPT

## 2022-01-20 PROCEDURE — 85610 PROTHROMBIN TIME: CPT

## 2022-01-20 NOTE — PROGRESS NOTES
ANTICOAGULATION MANAGEMENT     Yusef MARSHALL Alvares 46 year old male is on warfarin with therapeutic INR result. (Goal INR 2.0-3.0)    Recent labs: (last 7 days)     01/20/22  1108   INR 2.7*       ASSESSMENT     Source(s): Chart Review and Patient/Caregiver Call       Warfarin doses taken: Warfarin taken as instructed    Diet: No new diet changes identified    New illness, injury, or hospitalization: No    Medication/supplement changes: None noted    Signs or symptoms of bleeding or clotting: No    Previous INR: Supratherapeutic    Additional findings: None     PLAN     Recommended plan for no diet, medication or health factor changes affecting INR     Dosing Instructions: Continue your current warfarin dose with next INR in 4 weeks   (it has already been 3 weeks since last INR)    Summary  As of 1/20/2022    Full warfarin instructions:  5 mg every Sun, Tue, Thu; 7.5 mg all other days   Next INR check:  2/17/2022             Telephone call with Yusef who verbalizes understanding and agrees to plan    Lab visit scheduled    Education provided: Contact [phone]  with any changes, questions or concerns.     Plan made per [facility] anticoagulation protocol    Sonja Stone, RN  Anticoagulation Clinic  1/20/2022    _______________________________________________________________________     Anticoagulation Episode Summary     Current INR goal:  2.0-3.0   TTR:  86.9 % (1 y)   Target end date:  Indefinite   Send INR reminders to:  [facility]    Indications    Pulmonary embolism (H) [I26.99]  Long term current use of anticoagulant therapy [Z79.01]  Pulmonary embolism without acute cor pulmonale  unspecified chronicity  unspecified pulmonary embolism type (H) [I26.99]           Comments:  * PE hx. low protein C levels. Long term anticoagulation per Dr. Romo on 7-9-20         Anticoagulation Care Providers     Provider Role Specialty Phone number    Yusef Katz MD Referring Family Medicine [phone]

## 2022-02-17 ENCOUNTER — LAB (OUTPATIENT)
Dept: LAB | Facility: CLINIC | Age: 47
End: 2022-02-17
Payer: COMMERCIAL

## 2022-02-17 ENCOUNTER — ANTICOAGULATION THERAPY VISIT (OUTPATIENT)
Dept: ANTICOAGULATION | Facility: CLINIC | Age: 47
End: 2022-02-17

## 2022-02-17 DIAGNOSIS — I26.99 PULMONARY EMBOLISM (H): ICD-10-CM

## 2022-02-17 DIAGNOSIS — I26.99 PULMONARY EMBOLISM WITHOUT ACUTE COR PULMONALE, UNSPECIFIED CHRONICITY, UNSPECIFIED PULMONARY EMBOLISM TYPE (H): ICD-10-CM

## 2022-02-17 DIAGNOSIS — Z79.01 LONG TERM CURRENT USE OF ANTICOAGULANT THERAPY: ICD-10-CM

## 2022-02-17 DIAGNOSIS — I26.99 PULMONARY EMBOLISM (H): Primary | ICD-10-CM

## 2022-02-17 LAB — INR BLD: 2.7 (ref 0.9–1.1)

## 2022-02-17 PROCEDURE — 85610 PROTHROMBIN TIME: CPT

## 2022-02-17 PROCEDURE — 36416 COLLJ CAPILLARY BLOOD SPEC: CPT

## 2022-02-17 NOTE — PROGRESS NOTES
ANTICOAGULATION MANAGEMENT     Yusef Alvares 46 year old male is on warfarin with therapeutic INR result. (Goal INR 2.0-3.0)    Recent labs: (last 7 days)     02/17/22  0948   INR 2.7*       ASSESSMENT     Source(s): Patient/Caregiver Call       Warfarin doses taken: Warfarin taken as instructed    Diet: No new diet changes identified    New illness, injury, or hospitalization: No    Medication/supplement changes: None noted    Signs or symptoms of bleeding or clotting: No    Previous INR: Therapeutic last visit; previously outside of goal range    Additional findings: None     PLAN     Recommended plan for no diet, medication or health factor changes affecting INR     Dosing Instructions: Continue your current warfarin dose with next INR in 5 weeks       Summary  As of 2/17/2022    Full warfarin instructions:  5 mg every Sun, Tue, Thu; 7.5 mg all other days   Next INR check:  3/24/2022             Telephone call with Yusef who verbalizes understanding and agrees to plan    Lab visit scheduled    Education provided: Please call back if any changes to your diet, medications or how you've been taking warfarin and Contact [phone]  with any changes, questions or concerns.     Plan made per [facility] anticoagulation protocol    Jordan Norman RN  Anticoagulation Clinic  2/17/2022    _______________________________________________________________________     Anticoagulation Episode Summary     Current INR goal:  2.0-3.0   TTR:  86.9 % (1 y)   Target end date:  Indefinite   Send INR reminders to:  [facility]    Indications    Pulmonary embolism (H) [I26.99]  Long term current use of anticoagulant therapy [Z79.01]  Pulmonary embolism without acute cor pulmonale  unspecified chronicity  unspecified pulmonary embolism type (H) [I26.99]           Comments:  * PE hx. low protein C levels. Long term anticoagulation per Dr. Romo on 7-9-20         Anticoagulation Care Providers     Provider Role Specialty Phone number     Yusef Katz MD [facility] [phone]

## 2022-03-24 ENCOUNTER — ANTICOAGULATION THERAPY VISIT (OUTPATIENT)
Dept: ANTICOAGULATION | Facility: CLINIC | Age: 47
End: 2022-03-24

## 2022-03-24 ENCOUNTER — LAB (OUTPATIENT)
Dept: LAB | Facility: CLINIC | Age: 47
End: 2022-03-24
Payer: COMMERCIAL

## 2022-03-24 DIAGNOSIS — I26.99 PULMONARY EMBOLISM (H): ICD-10-CM

## 2022-03-24 DIAGNOSIS — Z79.01 LONG TERM CURRENT USE OF ANTICOAGULANT THERAPY: ICD-10-CM

## 2022-03-24 DIAGNOSIS — I26.99 PULMONARY EMBOLISM WITHOUT ACUTE COR PULMONALE, UNSPECIFIED CHRONICITY, UNSPECIFIED PULMONARY EMBOLISM TYPE (H): ICD-10-CM

## 2022-03-24 DIAGNOSIS — I26.99 PULMONARY EMBOLISM (H): Primary | ICD-10-CM

## 2022-03-24 LAB — INR BLD: 2 (ref 0.9–1.1)

## 2022-03-24 PROCEDURE — 85610 PROTHROMBIN TIME: CPT

## 2022-03-24 PROCEDURE — 36416 COLLJ CAPILLARY BLOOD SPEC: CPT

## 2022-03-24 NOTE — PROGRESS NOTES
ANTICOAGULATION MANAGEMENT     Yusef MARSHALL Alvares 46 year old male is on warfarin with therapeutic INR result. (Goal INR 2.0-3.0)    Recent labs: (last 7 days)     03/24/22  0855   INR 2.0*       ASSESSMENT       Source(s): Chart Review and Patient/Caregiver Call       Warfarin doses taken: Warfarin taken as instructed    Diet: No new diet changes identified    New illness, injury, or hospitalization: No    Medication/supplement changes: None noted    Signs or symptoms of bleeding or clotting: No    Previous INR: Therapeutic last 2(+) visits    Additional findings: None     PLAN     Recommended plan for no diet, medication or health factor changes affecting INR     Dosing Instructions: Continue your current warfarin dose with next INR in 6 weeks       Summary  As of 3/24/2022    Full warfarin instructions:  5 mg every Sun, Tue, Thu; 7.5 mg all other days   Next INR check:  5/5/2022             Telephone call with Yusef who verbalizes understanding and agrees to plan    Lab visit scheduled    Education provided: Please call back if any changes to your diet, medications or how you've been taking warfarin    Plan made per [facility] anticoagulation protocol    Thalia Sood RN  Anticoagulation Clinic  3/24/2022    _______________________________________________________________________     Anticoagulation Episode Summary     Current INR goal:  2.0-3.0   TTR:  86.9 % (1 y)   Target end date:  Indefinite   Send INR reminders to:  [facility]    Indications    Pulmonary embolism (H) [I26.99]  Long term current use of anticoagulant therapy [Z79.01]  Pulmonary embolism without acute cor pulmonale  unspecified chronicity  unspecified pulmonary embolism type (H) [I26.99]           Comments:  Hx PE. low protein C levels. Long term anticoagulation per Dr. Romo on 7-9-20         Anticoagulation Care Providers     Provider Role Specialty Phone number    Yusef Katz MD Referring Family Medicine [phone]

## 2022-04-18 ENCOUNTER — TRANSFERRED RECORDS (OUTPATIENT)
Dept: HEALTH INFORMATION MANAGEMENT | Facility: CLINIC | Age: 47
End: 2022-04-18
Payer: COMMERCIAL

## 2022-05-05 ENCOUNTER — LAB (OUTPATIENT)
Dept: LAB | Facility: CLINIC | Age: 47
End: 2022-05-05
Payer: COMMERCIAL

## 2022-05-05 ENCOUNTER — ANTICOAGULATION THERAPY VISIT (OUTPATIENT)
Dept: ANTICOAGULATION | Facility: CLINIC | Age: 47
End: 2022-05-05

## 2022-05-05 DIAGNOSIS — I26.99 PULMONARY EMBOLISM (H): ICD-10-CM

## 2022-05-05 DIAGNOSIS — I26.99 PULMONARY EMBOLISM WITHOUT ACUTE COR PULMONALE, UNSPECIFIED CHRONICITY, UNSPECIFIED PULMONARY EMBOLISM TYPE (H): ICD-10-CM

## 2022-05-05 DIAGNOSIS — Z79.01 LONG TERM CURRENT USE OF ANTICOAGULANT THERAPY: ICD-10-CM

## 2022-05-05 DIAGNOSIS — I26.99 PULMONARY EMBOLISM (H): Primary | ICD-10-CM

## 2022-05-05 LAB — INR BLD: 2.2 (ref 0.9–1.1)

## 2022-05-05 PROCEDURE — 85610 PROTHROMBIN TIME: CPT

## 2022-05-05 PROCEDURE — 36416 COLLJ CAPILLARY BLOOD SPEC: CPT

## 2022-05-05 NOTE — PROGRESS NOTES
ANTICOAGULATION MANAGEMENT     Yusef MARSHALL Dia 46 year old male is on warfarin with therapeutic INR result. (Goal INR 2.0-3.0)    Recent labs: (last 7 days)     05/05/22  0859   INR 2.2*       ASSESSMENT       Source(s): Chart Review and Patient/Caregiver Call       Warfarin doses taken: Warfarin taken as instructed    Diet: No new diet changes identified    New illness, injury, or hospitalization: No    Medication/supplement changes: None noted    Signs or symptoms of bleeding or clotting: No    Previous INR: Therapeutic last 2(+) visits    Additional findings: increase in activity     PLAN     Recommended plan for ongoing change(s) affecting INR     Dosing Instructions: continue your current warfarin dose with next INR in 6 weeks       Summary  As of 5/5/2022    Full warfarin instructions:  5 mg every Sun, Tue, Thu; 7.5 mg all other days   Next INR check:  6/16/2022             Telephone call with Kosta who verbalizes understanding and agrees to plan    Lab visit scheduled    Education provided: Please call back if any changes to your diet, medications or how you've been taking warfarin    Plan made per [facility] anticoagulation protocol    Thalia Sood RN  Anticoagulation Clinic  5/5/2022    _______________________________________________________________________     Anticoagulation Episode Summary     Current INR goal:  2.0-3.0   TTR:  86.9 % (1 y)   Target end date:  Indefinite   Send INR reminders to:  [facility]    Indications    Pulmonary embolism (H) [I26.99]  Long term current use of anticoagulant therapy [Z79.01]  Pulmonary embolism without acute cor pulmonale  unspecified chronicity  unspecified pulmonary embolism type (H) [I26.99]           Comments:  Hx PE. low protein C levels. Long term anticoagulation per Dr. Romo on 7-9-20         Anticoagulation Care Providers     Provider Role Specialty Phone number    Yusef Katz MD Referring Family Medicine [phone]

## 2022-06-02 DIAGNOSIS — R73.9 HYPERGLYCEMIA: Primary | ICD-10-CM

## 2022-06-02 DIAGNOSIS — F20.9 SCHIZOPHRENIA, UNSPECIFIED TYPE (H): ICD-10-CM

## 2022-06-02 DIAGNOSIS — E03.9 HYPOTHYROIDISM, UNSPECIFIED TYPE: ICD-10-CM

## 2022-06-02 DIAGNOSIS — E78.5 HYPERLIPIDEMIA LDL GOAL <130: ICD-10-CM

## 2022-06-06 RX ORDER — LEVOTHYROXINE SODIUM 50 UG/1
50 TABLET ORAL DAILY
Qty: 30 TABLET | Refills: 2 | Status: SHIPPED | OUTPATIENT
Start: 2022-06-06 | End: 2022-07-18

## 2022-06-06 RX ORDER — SIMVASTATIN 20 MG
TABLET ORAL
Qty: 30 TABLET | Refills: 2 | Status: SHIPPED | OUTPATIENT
Start: 2022-06-06 | End: 2022-07-18

## 2022-06-06 RX ORDER — SIMVASTATIN 40 MG
TABLET ORAL
Qty: 30 TABLET | Refills: 2 | Status: SHIPPED | OUTPATIENT
Start: 2022-06-06 | End: 2022-07-18

## 2022-06-16 ENCOUNTER — LAB (OUTPATIENT)
Dept: LAB | Facility: CLINIC | Age: 47
End: 2022-06-16
Payer: COMMERCIAL

## 2022-06-16 ENCOUNTER — ANTICOAGULATION THERAPY VISIT (OUTPATIENT)
Dept: ANTICOAGULATION | Facility: CLINIC | Age: 47
End: 2022-06-16

## 2022-06-16 DIAGNOSIS — Z79.01 LONG TERM CURRENT USE OF ANTICOAGULANT THERAPY: ICD-10-CM

## 2022-06-16 DIAGNOSIS — I26.99 PULMONARY EMBOLISM (H): ICD-10-CM

## 2022-06-16 DIAGNOSIS — I26.99 PULMONARY EMBOLISM (H): Primary | ICD-10-CM

## 2022-06-16 DIAGNOSIS — I26.99 PULMONARY EMBOLISM WITHOUT ACUTE COR PULMONALE, UNSPECIFIED CHRONICITY, UNSPECIFIED PULMONARY EMBOLISM TYPE (H): ICD-10-CM

## 2022-06-16 LAB — INR BLD: 2.1 (ref 0.9–1.1)

## 2022-06-16 PROCEDURE — 36416 COLLJ CAPILLARY BLOOD SPEC: CPT

## 2022-06-16 PROCEDURE — 85610 PROTHROMBIN TIME: CPT

## 2022-06-16 NOTE — PROGRESS NOTES
ANTICOAGULATION MANAGEMENT     Yusef MARSHALL Dia 46 year old male is on warfarin with therapeutic INR result. (Goal INR 2.0-3.0)    Recent labs: (last 7 days)     06/16/22  0842   INR 2.1*       ASSESSMENT       Source(s): Chart Review and Patient/Caregiver Call       Warfarin doses taken: Warfarin taken as instructed    Diet: No new diet changes identified    New illness, injury, or hospitalization: canker sore on his tongue that is painful - oral gel use    Medication/supplement changes: None noted    Signs or symptoms of bleeding or clotting: No    Previous INR: Therapeutic last 2(+) visits    Additional findings: None     PLAN     Recommended plan for no diet, medication or health factor changes affecting INR     Dosing Instructions: continue your current warfarin dose with next INR in 6 weeks       Summary  As of 6/16/2022    Full warfarin instructions:  5 mg every Sun, Tue, Thu; 7.5 mg all other days   Next INR check:  7/28/2022             Telephone call with Kosta who verbalizes understanding and agrees to plan    Lab visit scheduled    Education provided: Please call back if any changes to your diet, medications or how you've been taking warfarin    Plan made per [facility] anticoagulation protocol    Thalia Sood RN  Anticoagulation Clinic  6/16/2022    _______________________________________________________________________     Anticoagulation Episode Summary     Current INR goal:  2.0-3.0   TTR:  86.9 % (1 y)   Target end date:  Indefinite   Send INR reminders to:  [facility]    Indications    Pulmonary embolism (H) [I26.99]  Long term current use of anticoagulant therapy [Z79.01]  Pulmonary embolism without acute cor pulmonale  unspecified chronicity  unspecified pulmonary embolism type (H) [I26.99]           Comments:  Hx PE. low protein C levels. Long term anticoagulation per Dr. Romo on 7-9-20         Anticoagulation Care Providers     Provider Role Specialty Phone number    Yusef Katz MD  [facility] [phone]

## 2022-07-09 ENCOUNTER — HEALTH MAINTENANCE LETTER (OUTPATIENT)
Age: 47
End: 2022-07-09

## 2022-07-12 ENCOUNTER — TRANSFERRED RECORDS (OUTPATIENT)
Dept: HEALTH INFORMATION MANAGEMENT | Facility: CLINIC | Age: 47
End: 2022-07-12

## 2022-07-12 LAB — PHQ9 SCORE: 4

## 2022-07-14 ASSESSMENT — ENCOUNTER SYMPTOMS
SORE THROAT: 0
HEMATURIA: 0
FREQUENCY: 0
DYSURIA: 0
CONSTIPATION: 0
SHORTNESS OF BREATH: 0
DIZZINESS: 0
HEARTBURN: 0
COUGH: 0
ABDOMINAL PAIN: 0
HEADACHES: 0
PARESTHESIAS: 0
ARTHRALGIAS: 0
EYE PAIN: 0
JOINT SWELLING: 0
NERVOUS/ANXIOUS: 1
NAUSEA: 0
CHILLS: 0
DIARRHEA: 0
WEAKNESS: 0
HEMATOCHEZIA: 0
PALPITATIONS: 0
FEVER: 0
MYALGIAS: 0

## 2022-07-14 ASSESSMENT — ACTIVITIES OF DAILY LIVING (ADL): CURRENT_FUNCTION: NO ASSISTANCE NEEDED

## 2022-07-18 ENCOUNTER — OFFICE VISIT (OUTPATIENT)
Dept: FAMILY MEDICINE | Facility: CLINIC | Age: 47
End: 2022-07-18
Payer: COMMERCIAL

## 2022-07-18 VITALS
SYSTOLIC BLOOD PRESSURE: 128 MMHG | HEIGHT: 64 IN | DIASTOLIC BLOOD PRESSURE: 80 MMHG | RESPIRATION RATE: 20 BRPM | TEMPERATURE: 98.5 F | OXYGEN SATURATION: 97 % | HEART RATE: 82 BPM | BODY MASS INDEX: 31.51 KG/M2 | WEIGHT: 184.6 LBS

## 2022-07-18 DIAGNOSIS — Z23 ENCOUNTER FOR IMMUNIZATION: ICD-10-CM

## 2022-07-18 DIAGNOSIS — Z00.00 ENCOUNTER FOR MEDICARE ANNUAL WELLNESS EXAM: Primary | ICD-10-CM

## 2022-07-18 DIAGNOSIS — E03.9 HYPOTHYROIDISM, UNSPECIFIED TYPE: ICD-10-CM

## 2022-07-18 DIAGNOSIS — E78.5 HYPERLIPIDEMIA LDL GOAL <100: ICD-10-CM

## 2022-07-18 DIAGNOSIS — Z12.11 SCREEN FOR COLON CANCER: ICD-10-CM

## 2022-07-18 LAB — LDLC SERPL CALC-MCNC: 126 MG/DL

## 2022-07-18 PROCEDURE — 36415 COLL VENOUS BLD VENIPUNCTURE: CPT | Performed by: FAMILY MEDICINE

## 2022-07-18 PROCEDURE — 0064A COVID-19,PF,MODERNA (18+ YRS BOOSTER .25ML): CPT | Performed by: FAMILY MEDICINE

## 2022-07-18 PROCEDURE — 91306 COVID-19,PF,MODERNA (18+ YRS BOOSTER .25ML): CPT | Performed by: FAMILY MEDICINE

## 2022-07-18 PROCEDURE — 99214 OFFICE O/P EST MOD 30 MIN: CPT | Mod: 25 | Performed by: FAMILY MEDICINE

## 2022-07-18 PROCEDURE — G0439 PPPS, SUBSEQ VISIT: HCPCS | Performed by: FAMILY MEDICINE

## 2022-07-18 PROCEDURE — 83721 ASSAY OF BLOOD LIPOPROTEIN: CPT | Performed by: FAMILY MEDICINE

## 2022-07-18 RX ORDER — SIMVASTATIN 20 MG
TABLET ORAL
Qty: 30 TABLET | Refills: 11 | Status: SHIPPED | OUTPATIENT
Start: 2022-07-18 | End: 2023-07-03

## 2022-07-18 RX ORDER — LEVOTHYROXINE SODIUM 50 UG/1
50 TABLET ORAL DAILY
Qty: 30 TABLET | Refills: 11 | Status: SHIPPED | OUTPATIENT
Start: 2022-07-18 | End: 2023-07-03

## 2022-07-18 RX ORDER — SIMVASTATIN 40 MG
TABLET ORAL
Qty: 30 TABLET | Refills: 11 | Status: SHIPPED | OUTPATIENT
Start: 2022-07-18 | End: 2023-07-03

## 2022-07-18 ASSESSMENT — ENCOUNTER SYMPTOMS
COUGH: 0
CONSTIPATION: 0
CHILLS: 0
ARTHRALGIAS: 0
SHORTNESS OF BREATH: 0
HEADACHES: 0
MYALGIAS: 0
NAUSEA: 0
WEAKNESS: 0
DIZZINESS: 0
PALPITATIONS: 0
ABDOMINAL PAIN: 0
HEMATURIA: 0
HEMATOCHEZIA: 0
JOINT SWELLING: 0
DIARRHEA: 0
DYSURIA: 0
FEVER: 0
SORE THROAT: 0
NERVOUS/ANXIOUS: 1
FREQUENCY: 0
HEARTBURN: 0
EYE PAIN: 0
PARESTHESIAS: 0

## 2022-07-18 ASSESSMENT — PAIN SCALES - GENERAL: PAINLEVEL: NO PAIN (0)

## 2022-07-18 ASSESSMENT — ACTIVITIES OF DAILY LIVING (ADL): CURRENT_FUNCTION: NO ASSISTANCE NEEDED

## 2022-07-18 NOTE — PROGRESS NOTES
"SUBJECTIVE:   Yusef Alvares is a 46 year old male who presents for Preventive Visit.    Patient has been advised of split billing requirements and indicates understanding: Yes  Are you in the first 12 months of your Medicare coverage?  No    Healthy Habits:     In general, how would you rate your overall health?  Excellent    Frequency of exercise:  None    Do you usually eat at least 4 servings of fruit and vegetables a day, include whole grains    & fiber and avoid regularly eating high fat or \"junk\" foods?  No    Taking medications regularly:  Yes    Barriers to taking medications:  None    Medication side effects:  None    Ability to successfully perform activities of daily living:  No assistance needed    Home Safety:  No safety concerns identified    Hearing Impairment:  No hearing concerns    In the past 6 months, have you been bothered by leaking of urine?  No    In general, how would you rate your overall mental or emotional health?  Good      PHQ-2 Total Score: 0    Additional concerns today:  No    Do you feel safe in your environment? Yes    Have you ever done Advance Care Planning? (For example, a Health Directive, POLST, or a discussion with a medical provider or your loved ones about your wishes): No, advance care planning information given to patient to review.  Patient plans to discuss their wishes with loved ones or provider.         Fall risk  Fallen 2 or more times in the past year?: No  Any fall with injury in the past year?: No      Cognitive Screening   1) Repeat 3 items (Leader, Season, Table)    2) Clock draw: NORMAL  3) 3 item recall: Recalls 3 objects  Results: 3 items recalled: COGNITIVE IMPAIRMENT LESS LIKELY    Mini-CogTM Copyright VICKIE Mcfadden. Licensed by the author for use in [facility]; reprinted with permission (heaven@.[facility]). All rights reserved.      Do you have sleep apnea, excessive snoring or daytime drowsiness?: yes- sleep apnea    Reviewed and updated as needed this " visit by clinical staff   Tobacco  Allergies  Meds   Med Hx  Surg Hx  Fam Hx  Soc Hx          Reviewed and updated as needed this visit by Provider                   Social History     Tobacco Use     Smoking status: Never Smoker     Smokeless tobacco: Never Used   Substance Use Topics     Alcohol use: No     Alcohol/week: 0.0 standard drinks     If you drink alcohol do you typically have >3 drinks per day or >7 drinks per week? Not applicable    No flowsheet data found.    Hyperlipidemia Follow-Up      Are you regularly taking any medication or supplement to lower your cholesterol?   Yes- simvastatin    Are you having muscle aches or other side effects that you think could be caused by your cholesterol lowering medication?  No    Hypothyroidism Follow-up      Since last visit, patient describes the following symptoms: Weight stable, no hair loss, no skin changes, no constipation, no loose stools      Current providers sharing in care for this patient include:   Patient Care Team:  Yusef Katz MD as PCP - General (Family Practice)  Yusef Katz MD as Assigned PCP  Heydi Hernandes RN as Specialty Care Coordinator (Oncology)    The following health maintenance items are reviewed in Epic and correct as of today:  Health Maintenance Due   Topic Date Due     COLORECTAL CANCER SCREENING  Never done     MENTAL HEALTH TX PLAN  03/02/2016     DTAP/TDAP/TD IMMUNIZATION (8 - Td or Tdap) 06/13/2022               Review of Systems   Constitutional: Negative for chills and fever.   HENT: Negative for congestion, ear pain, hearing loss and sore throat.    Eyes: Negative for pain and visual disturbance.   Respiratory: Negative for cough and shortness of breath.    Cardiovascular: Negative for chest pain, palpitations and peripheral edema.   Gastrointestinal: Negative for abdominal pain, constipation, diarrhea, heartburn, hematochezia and nausea.   Genitourinary: Negative for dysuria, frequency, genital sores,  "hematuria, impotence, penile discharge and urgency.   Musculoskeletal: Negative for arthralgias, joint swelling and myalgias.   Skin: Negative for rash.   Neurological: Negative for dizziness, weakness, headaches and paresthesias.   Psychiatric/Behavioral: Negative for mood changes. The patient is nervous/anxious.          OBJECTIVE:   /80   Pulse 82   Temp 98.5  F (36.9  C) (Tympanic)   Resp 20   Ht 1.635 m (5' 4.37\")   Wt 83.7 kg (184 lb 9.6 oz)   SpO2 97%   BMI 31.32 kg/m   Estimated body mass index is 31.32 kg/m  as calculated from the following:    Height as of this encounter: 1.635 m (5' 4.37\").    Weight as of this encounter: 83.7 kg (184 lb 9.6 oz).  Physical Exam  GENERAL: healthy, alert and no distress  EYES: Eyes grossly normal to inspection, PERRL and conjunctivae and sclerae normal  HENT: ear canals and TM's normal, nose and mouth without ulcers or lesions  NECK: no adenopathy, no asymmetry, masses, or scars and thyroid normal to palpation  RESP: lungs clear to auscultation - no rales, rhonchi or wheezes  CV: regular rate and rhythm, normal S1 S2, no S3 or S4, no murmur, click or rub, no peripheral edema and peripheral pulses strong  ABDOMEN: soft, nontender, no hepatosplenomegaly, no masses and bowel sounds normal   (male): normal male genitalia without lesions or urethral discharge, no hernia  MS: no gross musculoskeletal defects noted, no edema  SKIN: no suspicious lesions or rashes  NEURO: Normal strength and tone, mentation intact and speech normal  PSYCH: mentation appears normal, affect normal/bright  LYMPH: anterior cervical: no adenopathy  posterior cervical: no adenopathy        ASSESSMENT / PLAN:   (Z00.00) Encounter for Medicare annual wellness exam  (primary encounter diagnosis)  Comment: Discussed healthy lifestyle and preventative cares.    Plan:     (E03.9) Hypothyroidism, unspecified type  Comment: check lab and refilled med  Plan: OFFICE/OUTPT VISIT,EST,LEVL IV, " "levothyroxine         (SYNTHROID/LEVOTHROID) 50 MCG tablet            (E78.5) Hyperlipidemia LDL goal <100  Comment: check lab and refilled med  Plan: OFFICE/OUTPT VISIT,EST,LEVL IV, simvastatin         (ZOCOR) 20 MG tablet, simvastatin (ZOCOR) 40 MG        tablet, LDL cholesterol direct            (Z12.11) Screen for colon cancer  Comment:   Plan: Colonscopy Screening  Referral            (Z23) Encounter for immunization  Comment:   Plan: COVID-19,PF,MODERNA (18+ YRS BOOSTER .25ML)              Patient has been advised of split billing requirements and indicates understanding: Yes    COUNSELING:  Reviewed preventive health counseling, as reflected in patient instructions       Regular exercise       Healthy diet/nutrition       Colon cancer screening       Prostate cancer screening    Estimated body mass index is 31.32 kg/m  as calculated from the following:    Height as of this encounter: 1.635 m (5' 4.37\").    Weight as of this encounter: 83.7 kg (184 lb 9.6 oz).    Weight management plan: diet    He reports that he has never smoked. He has never used smokeless tobacco.      Appropriate preventive services were discussed with this patient, including applicable screening as appropriate for cardiovascular disease, diabetes, osteopenia/osteoporosis, and glaucoma.  As appropriate for age/gender, discussed screening for colorectal cancer, prostate cancer, breast cancer, and cervical cancer. Checklist reviewing preventive services available has been given to the patient.    Reviewed patients plan of care and provided an AVS. The Basic Care Plan (routine screening as documented in Health Maintenance) for Yusef meets the Care Plan requirement. This Care Plan has been established and reviewed with the Patient.    Counseling Resources:  ATP IV Guidelines  Pooled Cohorts Equation Calculator  Breast Cancer Risk Calculator  Breast Cancer: Medication to Reduce Risk  FRAX Risk Assessment  ICSI Preventive " Guidelines  Dietary Guidelines for Americans, 2010  USDA's MyPlate  ASA Prophylaxis  Lung CA Screening    Yusef Katz MD  [facility]    Identified Health Risks:    He is at risk for lack of exercise and has been provided with information to increase physical activity for the benefit of his well-being.  The patient was counseled and encouraged to consider modifying their diet and eating habits. He was provided with information on recommended healthy diet options.

## 2022-07-18 NOTE — LETTER
Called in adequate supply to bridge to next session July 19, 2022      Kosta MARSHALL Dia  [address] [address]        Dear ,    We are writing to inform you of your test results.    Your LDL cholesterol is up some.   Please continue with the simvastatin and have a low cholesterol diet.     Resulted Orders   LDL cholesterol direct   Result Value Ref Range    LDL Cholesterol Direct 126 (H) <100 mg/dL      Comment:      Age 0-19 years:  Desirable: 0-110 mg/dL   Borderline high: 110-129 mg/dL   High: >= 130 mg/dL    Age 20 years and older:  Desirable: <100mg/dL  Above desirable: 100-129 mg/dL   Borderline high: 130-159 mg/dL   High: 160-189 mg/dL   Very high: >= 190 mg/dL       If you have any questions or concerns, please call the clinic at the number listed above.       Sincerely,      Yusef Katz MD

## 2022-07-18 NOTE — PATIENT INSTRUCTIONS
Please go to lab.    Please check with your insurance about a Td shot.    Please get your colonoscopy done.    Please be aware that there will be an additional charge during your preventative visit due to either a new diagnosis and/or chronic disease management.    Preventative visits screen for diseases prior to they occur.  They do not cover for any new diagnosis or chronic disease management which would include medication refills, labs etc.    If you have questions regarding your coverage please check with your insurance provider.  At [facility] we need to code correctly to be in compliance with all insurance companies.        Thank you for choosing [facility].  You may be receiving an email and/or telephone survey request from [facility] Customer Experience regarding your visit today.  Please take a few minutes to respond to the survey to let us know how we are doing.      If you have questions or concerns, please contact us via [employer] or you can contact your care team at [phone] option [phone].    Our Clinic hours are:  Monday - Thursday 7am-6pm  Friday 7am-5pm    The [place] outpatient lab hours are:  Monday - Friday 7am-4:30pm    Appointments are required, call [phone]    If you have clinical questions after hours or would like to schedule an appointment,  call the clinic at [phone].    Patient Education   Personalized Prevention Plan  You are due for the preventive services outlined below.  Your care team is available to assist you in scheduling these services.  If you have already completed any of these items, please share that information with your care team to update in your medical record.  Health Maintenance Due   Topic Date Due    Colorectal Cancer Screening  Never done    MENTAL HEALTH TX PLAN  03/02/2016    Diptheria Tetanus Pertussis (DTAP/TDAP/TD) Vaccine (8 - Td or Tdap) 06/13/2022       Exercise for a Healthier Heart  You may wonder how you can improve the health of your heart. If  you re thinking about exercise, you re on the right track. You don t need to become an athlete. But you do need a certain amount of brisk exercise to help strengthen your heart. If you have been diagnosed with a heart condition, your healthcare provider may advise exercise to help stabilize your condition. To help make exercise a habit, choose safe, fun activities.      Exercise with a friend. When activity is fun, you're more likely to stick with it.   Before you start  Check with your healthcare provider before starting an exercise program. This is especially important if you have not been active for a while. It's also important if you have a long-term (chronic) health problem such as heart disease, diabetes, or obesity. Or if you are at high risk for having these problems.   Why exercise?  Exercising regularly offers many healthy rewards. It can help you do all of the following:   Improve your blood cholesterol level to help prevent further heart trouble  Lower your blood pressure to help prevent a stroke or heart attack  Control diabetes, or reduce your risk of getting this disease  Improve your heart and lung function  Reach and stay at a healthy weight  Make your muscles stronger so you can stay active  Prevent falls and fractures by slowing the loss of bone mass (osteoporosis)  Manage stress better  Reduce your blood pressure  Improve your sense of self and your body image  Exercise tips    Ease into your routine. Set small goals. Then build on them. If you are not sure what your activity level should be, talk with your healthcare provider first before starting an exercise routine.  Exercise on most days. Aim for a total of 150 minutes (2 hours and 30 minutes) or more of moderate-intensity aerobic activity each week. Or 75 minutes (1 hour and 15 minutes) or more of vigorous-intensity aerobic activity each week. Or try for a combination of both. Moderate activity means that you breathe heavier and your heart  rate increases but you can still talk. Think about doing 40 minutes of moderate exercise, 3 to 4 times a week. For best results, activity should last for about 40 minutes to lower blood pressure and cholesterol. It's OK to work up to the 40-minute period over time. Examples of moderate-intensity activity are walking 1 mile in 15 minutes. Or doing 30 to 45 minutes of yard work.  Step up your daily activity level.  Along with your exercise program, try being more active the whole day. Walk instead of drive. Or park further away so that you take more steps each day. Do more household tasks or yard work. You may not be able to meet the advised mount of physical activity. But doing some moderate- or vigorous-intensity aerobic activity can help reduce your risk for heart disease. Your healthcare provider can help you figure out what is best for you.  Choose 1 or more activities you enjoy.  Walking is one of the easiest things you can do. You can also try swimming, riding a bike, dancing, or taking an exercise class.    When to call your healthcare provider  Call your healthcare provider if you have any of these:   Chest pain or feel dizzy or lightheaded  Burning, tightness, pressure, or heaviness in your chest, neck, shoulders, back, or arms  Abnormal shortness of breath  More joint or muscle pain  A very fast or irregular heartbeat (palpitations)  Mamina Shkola last reviewed this educational content on 7/1/2019 2000-2021 [employer]. All rights reserved. This information is not intended as a substitute for professional medical care. Always follow your healthcare professional's instructions.          Understanding USDA MyPlate  The USDA has guidelines to help you make healthy food choices. These are called MyPlate. MyPlate shows the food groups that make up healthy meals using the image of a place setting. Before you eat, think about the healthiest choices for what to put on your plate or in your cup or bowl.  To learn more about building a healthy plate, visit www.choosemyplate.gov.    The food groups  Fruits. Any fruit or 100% fruit juice counts as part of the Fruit Group. Fruits may be fresh, canned, frozen, or dried, and may be whole, cut-up, or pureed. Make 1/2 of your plate fruits and vegetables.  Vegetables. Any vegetable or 100% vegetable juice counts as a member of the Vegetable Group. Vegetables may be fresh, frozen, canned, or dried. They can be served raw or cooked and may be whole, cut-up, or mashed. Make 1/2 of your plate fruits and vegetables.  Grains. All foods made from grains are part of the Grains Group. These include wheat, rice, oats, cornmeal, and barley. Grains are often used to make foods such as bread, pasta, oatmeal, cereal, tortillas, and grits. Grains should be no more than 1/4 of your plate. At least half of your grains should be whole grains.  Protein. This group includes meat, poultry, seafood, beans and peas, eggs, processed soy products (such as tofu), nuts (including nut butters), and seeds. Make protein choices no more than 1/4 of your plate. Meat and poultry choices should be lean or low fat.  Dairy. The Dairy Group includes all fluid milk products and foods made from milk that contain calcium, such as yogurt and cheese. (Foods that have little calcium, such as cream, butter, and cream cheese, are not part of this group.) Most dairy choices should be low-fat or fat-free.  Oils. Oils aren't a food group, but they do contain essential nutrients. However it's important to watch your intake of oils. These are fats that are liquid at room temperature. They include canola, corn, olive, soybean, vegetable, and sunflower oil. Foods that are mainly oil include mayonnaise, certain salad dressings, and soft margarines. You likely already get your daily oil allowance from the foods you eat.  Things to limit  Eating healthy also means limiting these things in your diet:     Salt (sodium). Many  processed foods have a lot of sodium. To keep sodium intake down, eat fresh vegetables, meats, poultry, and seafood when possible. Purchase low-sodium, reduced-sodium, or no-salt-added food products at the store. And don't add salt to your meals at home. Instead, season them with herbs and spices such as dill, oregano, cumin, and paprika. Or try adding flavor with lemon or lime zest and juice.  Saturated fat. Saturated fats are most often found in animal products such as beef, pork, and chicken. They are often solid at room temperature, such as butter. To reduce your saturated fat intake, choose leaner cuts of meat and poultry. And try healthier cooking methods such as grilling, broiling, roasting, or baking. For a simple lower-fat swap, use plain nonfat yogurt instead of mayonnaise when making potato salad or macaroni salad.  Added sugars. These are sugars added to foods. They are in foods such as ice cream, candy, soda, fruit drinks, sports drinks, energy drinks, cookies, pastries, jams, and syrups. Cut down on added sugars by sharing sweet treats with a family member or friend. You can also choose fruit for dessert, and drink water or other unsweetened beverages.     [employer] last reviewed this educational content on 6/1/2020 2000-2021 [employer]. All rights reserved. This information is not intended as a substitute for professional medical care. Always follow your healthcare professional's instructions.

## 2022-07-27 DIAGNOSIS — I26.99 PULMONARY EMBOLISM (H): Primary | ICD-10-CM

## 2022-07-28 ENCOUNTER — ANTICOAGULATION THERAPY VISIT (OUTPATIENT)
Dept: ANTICOAGULATION | Facility: CLINIC | Age: 47
End: 2022-07-28

## 2022-07-28 ENCOUNTER — LAB (OUTPATIENT)
Dept: LAB | Facility: CLINIC | Age: 47
End: 2022-07-28
Payer: COMMERCIAL

## 2022-07-28 ENCOUNTER — DOCUMENTATION ONLY (OUTPATIENT)
Dept: ANTICOAGULATION | Facility: CLINIC | Age: 47
End: 2022-07-28

## 2022-07-28 DIAGNOSIS — I26.99 PULMONARY EMBOLISM WITHOUT ACUTE COR PULMONALE, UNSPECIFIED CHRONICITY, UNSPECIFIED PULMONARY EMBOLISM TYPE (H): ICD-10-CM

## 2022-07-28 DIAGNOSIS — I26.99 PULMONARY EMBOLISM (H): ICD-10-CM

## 2022-07-28 DIAGNOSIS — I26.99 PULMONARY EMBOLISM, UNSPECIFIED CHRONICITY, UNSPECIFIED PULMONARY EMBOLISM TYPE, UNSPECIFIED WHETHER ACUTE COR PULMONALE PRESENT (H): Primary | ICD-10-CM

## 2022-07-28 DIAGNOSIS — Z79.01 LONG TERM CURRENT USE OF ANTICOAGULANT THERAPY: ICD-10-CM

## 2022-07-28 DIAGNOSIS — I26.99 PULMONARY EMBOLISM (H): Primary | ICD-10-CM

## 2022-07-28 LAB — INR BLD: 2.5 (ref 0.9–1.1)

## 2022-07-28 PROCEDURE — 36416 COLLJ CAPILLARY BLOOD SPEC: CPT

## 2022-07-28 PROCEDURE — 85610 PROTHROMBIN TIME: CPT

## 2022-07-28 NOTE — PROGRESS NOTES
ANTICOAGULATION CLINIC REFERRAL RENEWAL REQUEST       An annual renewal order is required for all patients referred to [facility].?  Please review and sign the pended referral order for Yusef Alvares.       ANTICOAGULATION SUMMARY      Warfarin indication(s)   PE    Mechanical heart valve present?  NO       Current goal range   INR: 2.0-3.0     Goal appropriate for indication? Goal INR 2-3, standard for indication(s) above     Time in Therapeutic Range (TTR)  (Goal > 60%) 86.9%       Office visit with referring provider's group within last year yes on 7/18/2022       Sonja Stone, RN  [facility]

## 2022-07-28 NOTE — PROGRESS NOTES
ANTICOAGULATION MANAGEMENT     Yusef Alvares 46 year old male is on warfarin with therapeutic INR result. (Goal INR 2.0-3.0)    Recent labs: (last 7 days)     07/28/22  0858   INR 2.5*       ASSESSMENT       Source(s): Chart Review and Patient/Caregiver Call       Warfarin doses taken: Warfarin taken as instructed    Diet: No new diet changes identified    New illness, injury, or hospitalization: No    Medication/supplement changes: None noted    Signs or symptoms of bleeding or clotting: No    Previous INR: Therapeutic last 2(+) visits    Additional findings: None       PLAN     Recommended plan for no diet, medication or health factor changes affecting INR     Dosing Instructions: Continue your current warfarin dose with next INR in 6 weeks       Summary  As of 7/28/2022    Full warfarin instructions:  5 mg every Sun, Tue, Thu; 7.5 mg all other days   Next INR check:  9/8/2022             Telephone call with Kosta who verbalizes understanding and agrees to plan    Lab visit scheduled    Education provided: Contact [phone]  with any changes, questions or concerns.     Plan made per [facility] anticoagulation protocol    Sonja Stone, RN  Anticoagulation Clinic  7/28/2022    _______________________________________________________________________     Anticoagulation Episode Summary     Current INR goal:  2.0-3.0   TTR:  86.9 % (1 y)   Target end date:  Indefinite   Send INR reminders to:  [facility]    Indications    Pulmonary embolism (H) [I26.99]  Long term current use of anticoagulant therapy [Z79.01]  Pulmonary embolism without acute cor pulmonale  unspecified chronicity  unspecified pulmonary embolism type (H) [I26.99]           Comments:  Hx PE. low protein C levels. Long term anticoagulation per Dr. Romo on 7-9-20         Anticoagulation Care Providers     Provider Role Specialty Phone number    Yusef Katz MD Referring Family Medicine [phone]

## 2022-09-03 ENCOUNTER — HEALTH MAINTENANCE LETTER (OUTPATIENT)
Age: 47
End: 2022-09-03

## 2022-09-08 ENCOUNTER — ANTICOAGULATION THERAPY VISIT (OUTPATIENT)
Dept: ANTICOAGULATION | Facility: CLINIC | Age: 47
End: 2022-09-08

## 2022-09-08 ENCOUNTER — LAB (OUTPATIENT)
Dept: LAB | Facility: CLINIC | Age: 47
End: 2022-09-08
Payer: COMMERCIAL

## 2022-09-08 DIAGNOSIS — I26.99 PULMONARY EMBOLISM (H): Primary | ICD-10-CM

## 2022-09-08 DIAGNOSIS — Z79.01 LONG TERM CURRENT USE OF ANTICOAGULANT THERAPY: ICD-10-CM

## 2022-09-08 DIAGNOSIS — Z79.899 HIGH RISK MEDICATION USE: Primary | ICD-10-CM

## 2022-09-08 DIAGNOSIS — Z79.899 HIGH RISK MEDICATION USE: ICD-10-CM

## 2022-09-08 DIAGNOSIS — E03.9 HYPOTHYROIDISM, UNSPECIFIED TYPE: ICD-10-CM

## 2022-09-08 DIAGNOSIS — I26.99 PULMONARY EMBOLISM, UNSPECIFIED CHRONICITY, UNSPECIFIED PULMONARY EMBOLISM TYPE, UNSPECIFIED WHETHER ACUTE COR PULMONALE PRESENT (H): ICD-10-CM

## 2022-09-08 DIAGNOSIS — I26.99 PULMONARY EMBOLISM WITHOUT ACUTE COR PULMONALE, UNSPECIFIED CHRONICITY, UNSPECIFIED PULMONARY EMBOLISM TYPE (H): ICD-10-CM

## 2022-09-08 DIAGNOSIS — I26.99 PULMONARY EMBOLISM (H): ICD-10-CM

## 2022-09-08 DIAGNOSIS — R73.9 HYPERGLYCEMIA: ICD-10-CM

## 2022-09-08 DIAGNOSIS — F20.9 SCHIZOPHRENIA, UNSPECIFIED TYPE (H): ICD-10-CM

## 2022-09-08 LAB
AMPHETAMINES UR QL: NOT DETECTED
ANION GAP SERPL CALCULATED.3IONS-SCNC: 8 MMOL/L (ref 7–15)
BARBITURATES UR QL SCN: NOT DETECTED
BENZODIAZ UR QL SCN: NOT DETECTED
BUN SERPL-MCNC: 12.5 MG/DL (ref 6–20)
BUPRENORPHINE UR QL: NOT DETECTED
CALCIUM SERPL-MCNC: 8.8 MG/DL (ref 8.6–10)
CANNABINOIDS UR QL: NOT DETECTED
CHLORIDE SERPL-SCNC: 103 MMOL/L (ref 98–107)
CHOLEST SERPL-MCNC: 180 MG/DL
COCAINE UR QL SCN: NOT DETECTED
CREAT SERPL-MCNC: 0.84 MG/DL (ref 0.67–1.17)
D-METHAMPHET UR QL: NOT DETECTED
DEPRECATED HCO3 PLAS-SCNC: 30 MMOL/L (ref 22–29)
FASTING STATUS PATIENT QL REPORTED: YES
GFR SERPL CREATININE-BSD FRML MDRD: >90 ML/MIN/1.73M2
GLUCOSE SERPL-MCNC: 96 MG/DL (ref 70–99)
GLUCOSE SERPL-MCNC: 96 MG/DL (ref 70–99)
HBA1C MFR BLD: 5.4 % (ref 0–5.6)
HDLC SERPL-MCNC: 43 MG/DL
HOLD SPECIMEN: NORMAL
INR BLD: 2.7 (ref 0.9–1.1)
LDLC SERPL CALC-MCNC: 104 MG/DL
METHADONE UR QL SCN: NOT DETECTED
NONHDLC SERPL-MCNC: 137 MG/DL
OPIATES UR QL SCN: NOT DETECTED
OXYCODONE UR QL SCN: NOT DETECTED
PCP UR QL SCN: NOT DETECTED
POTASSIUM SERPL-SCNC: 4.5 MMOL/L (ref 3.4–5.3)
PROLACTIN SERPL 3RD IS-MCNC: 8 NG/ML (ref 4–15)
PROPOXYPH UR QL: NOT DETECTED
SODIUM SERPL-SCNC: 141 MMOL/L (ref 136–145)
T4 FREE SERPL-MCNC: 0.92 NG/DL (ref 0.9–1.7)
TRICYCLICS UR QL SCN: NOT DETECTED
TRIGL SERPL-MCNC: 164 MG/DL
TSH SERPL DL<=0.005 MIU/L-ACNC: 2.01 UIU/ML (ref 0.3–4.2)

## 2022-09-08 PROCEDURE — 85610 PROTHROMBIN TIME: CPT

## 2022-09-08 PROCEDURE — 36416 COLLJ CAPILLARY BLOOD SPEC: CPT

## 2022-09-08 PROCEDURE — 36415 COLL VENOUS BLD VENIPUNCTURE: CPT

## 2022-09-08 PROCEDURE — 84146 ASSAY OF PROLACTIN: CPT

## 2022-09-08 PROCEDURE — 83036 HEMOGLOBIN GLYCOSYLATED A1C: CPT

## 2022-09-08 PROCEDURE — 80048 BASIC METABOLIC PNL TOTAL CA: CPT

## 2022-09-08 PROCEDURE — 84443 ASSAY THYROID STIM HORMONE: CPT

## 2022-09-08 PROCEDURE — 80061 LIPID PANEL: CPT

## 2022-09-08 PROCEDURE — 80306 DRUG TEST PRSMV INSTRMNT: CPT

## 2022-09-08 PROCEDURE — 84439 ASSAY OF FREE THYROXINE: CPT

## 2022-09-08 NOTE — PROGRESS NOTES
ANTICOAGULATION MANAGEMENT     Yusef Alvares 46 year old male is on warfarin with therapeutic INR result. (Goal INR 2.0-3.0)    Recent labs: (last 7 days)     09/08/22  0907   INR 2.7*       ASSESSMENT       Source(s): Chart Review and Patient/Caregiver Call       Warfarin doses taken: Warfarin taken as instructed    Diet: No new diet changes identified    New illness, injury, or hospitalization: No    Medication/supplement changes: None noted    Signs or symptoms of bleeding or clotting: No    Previous INR: Therapeutic last 2(+) visits    Additional findings: None       PLAN     Recommended plan for no diet, medication or health factor changes affecting INR     Dosing Instructions: Continue your current warfarin dose with next INR in 6 weeks       Summary  As of 9/8/2022    Full warfarin instructions:  5 mg every Sun, Tue, Thu; 7.5 mg all other days   Next INR check:  10/20/2022             Telephone call with Kosat who verbalizes understanding and agrees to plan    Lab visit scheduled    Education provided: Contact [phone]  with any changes, questions or concerns.     Plan made per [facility] anticoagulation protocol    Sonja Stone RN  Anticoagulation Clinic  9/8/2022    _______________________________________________________________________     Anticoagulation Episode Summary     Current INR goal:  2.0-3.0   TTR:  86.9 % (1 y)   Target end date:  Indefinite   Send INR reminders to:  [facility]    Indications    Pulmonary embolism (H) [I26.99]  Long term current use of anticoagulant therapy [Z79.01]  Pulmonary embolism without acute cor pulmonale  unspecified chronicity  unspecified pulmonary embolism type (H) [I26.99]  Pulmonary embolism  unspecified chronicity  unspecified pulmonary embolism type  unspecified whether acute cor pulmonale present (H) [I26.99]           Comments:  Hx PE. low protein C levels. Long term anticoagulation per Dr. Romo on 7-9-20         Anticoagulation Care Providers      Provider Role Specialty Phone number    Yusef Katz MD Referring Family Medicine [phone]

## 2022-10-05 ENCOUNTER — MYC MEDICAL ADVICE (OUTPATIENT)
Dept: FAMILY MEDICINE | Facility: CLINIC | Age: 47
End: 2022-10-05

## 2022-10-10 ENCOUNTER — TRANSFERRED RECORDS (OUTPATIENT)
Dept: HEALTH INFORMATION MANAGEMENT | Facility: CLINIC | Age: 47
End: 2022-10-10

## 2022-10-20 ENCOUNTER — TELEPHONE (OUTPATIENT)
Dept: ANTICOAGULATION | Facility: CLINIC | Age: 47
End: 2022-10-20

## 2022-10-20 ENCOUNTER — ANTICOAGULATION THERAPY VISIT (OUTPATIENT)
Dept: ANTICOAGULATION | Facility: CLINIC | Age: 47
End: 2022-10-20

## 2022-10-20 ENCOUNTER — LAB (OUTPATIENT)
Dept: LAB | Facility: CLINIC | Age: 47
End: 2022-10-20
Payer: COMMERCIAL

## 2022-10-20 DIAGNOSIS — Z79.01 LONG TERM CURRENT USE OF ANTICOAGULANT THERAPY: ICD-10-CM

## 2022-10-20 DIAGNOSIS — I26.99 PULMONARY EMBOLISM (H): Primary | ICD-10-CM

## 2022-10-20 DIAGNOSIS — I26.99 PULMONARY EMBOLISM, UNSPECIFIED CHRONICITY, UNSPECIFIED PULMONARY EMBOLISM TYPE, UNSPECIFIED WHETHER ACUTE COR PULMONALE PRESENT (H): ICD-10-CM

## 2022-10-20 DIAGNOSIS — I26.99 PULMONARY EMBOLISM WITHOUT ACUTE COR PULMONALE, UNSPECIFIED CHRONICITY, UNSPECIFIED PULMONARY EMBOLISM TYPE (H): ICD-10-CM

## 2022-10-20 DIAGNOSIS — I26.99 PULMONARY EMBOLISM (H): ICD-10-CM

## 2022-10-20 LAB — INR BLD: 2.1 (ref 0.9–1.1)

## 2022-10-20 PROCEDURE — 85610 PROTHROMBIN TIME: CPT

## 2022-10-20 PROCEDURE — 36416 COLLJ CAPILLARY BLOOD SPEC: CPT

## 2022-10-20 NOTE — PROGRESS NOTES
ANTICOAGULATION MANAGEMENT     Yusef Alvares 47 year old male is on warfarin with therapeutic INR result. (Goal INR 2.0-3.0)    Recent labs: (last 7 days)     10/20/22  0927   INR 2.1*       ASSESSMENT       Source(s): Chart Review and Patient/Caregiver Call       Warfarin doses taken: Warfarin taken as instructed    Diet: No new diet changes identified    New illness, injury, or hospitalization: No    Medication/supplement changes: risperdal as needed use No interaction anticipated    Signs or symptoms of bleeding or clotting: No    Previous INR: Therapeutic last 2(+) visits    Additional findings: Upcoming surgery/procedure 12-19-22. Sent to [facility] for review.       PLAN     Recommended plan for no diet, medication or health factor changes affecting INR     Dosing Instructions: Continue your current warfarin dose with next INR in 6 weeks       Summary  As of 10/20/2022    Full warfarin instructions:  5 mg every Sun, Tue, Thu; 7.5 mg all other days; Starting 10/20/2022   Next INR check:  12/1/2022             Telephone call with Kosta who verbalizes understanding and agrees to plan    Lab visit scheduled    Education provided:     Please call back if any changes to your diet, medications or how you've been taking warfarin    Contact [phone]  with any changes, questions or concerns.     Plan made per [facility] anticoagulation protocol    Suzie Fish RN  Anticoagulation Clinic  10/20/2022    _______________________________________________________________________     Anticoagulation Episode Summary     Current INR goal:  2.0-3.0   TTR:  87.7 % (1 y)   Target end date:  Indefinite   Send INR reminders to:  [facility] [place]    Indications    Pulmonary embolism (H) [I26.99]  Long term current use of anticoagulant therapy [Z79.01]  Pulmonary embolism without acute cor pulmonale  unspecified chronicity  unspecified pulmonary embolism type (H) [I26.99]  Pulmonary embolism  unspecified chronicity  unspecified pulmonary  embolism type  unspecified whether acute cor pulmonale present (H) [I26.99]           Comments:  Hx PE. low protein C levels. Long term anticoagulation per Dr. Romo on 7-9-20         Anticoagulation Care Providers     Provider Role Specialty Phone number    Yusef Katz MD Referring Family Medicine [phone]

## 2022-10-20 NOTE — TELEPHONE ENCOUNTER
Patient is having a colonoscopy on 12-19-22. Please advise on warfarin instructions.    Suzie Fish RN, BSN, PHN

## 2022-10-30 NOTE — TELEPHONE ENCOUNTER
Refilled once.  I see he has an appointment with me coming up.  Yusef Katz MD  Family Medicine     [id number]

## 2022-11-30 NOTE — TELEPHONE ENCOUNTER
"[facility]-PROCEDURAL ANTICOAGULATION  MANAGEMENT    ASSESSMENT     Warfarin interruption plan for colonoscopy on 2022.    Indication for Anticoagulation: PE and Protein S Deficiency       PE 2019 unprovoked    Hematology testing 2020      [facility]-Procedure Risk stratification for thromboembolism: high ( Chest guidelines)    HIGH RISK:  CHEST Perioperative Management guidelines suggests bridging patients at high risk for thromboembolism when interrupting warfarin for an elective surgery/procedure     RECOMMENDATION       Pre-Procedure:  o Hold warfarin for 4 days, until after procedure startin/15/2022   o Enoxaparin (Lovenox) 80 mg subq Q 12 hrs (1 mg/kg Q 12 hrs for CrCl >= 60 ml/min and BMI <= 40 kg/m2)   - Start enoxaparin: 2022  - Last dose of enoxaparin prior to procedure: 2022 AM  (~24 hours prior to procedure)      Post-Procedure:  o Resume enoxaparin (Lovenox) ~ 24-48 hrs post procedure when okay with provider doing procedure.  May be longer if polypectomy performed. Continue until INR >= 2.0  o Resume warfarin if okay with provider doing procedure on: PM day resumes Lovenox  - Start at least 4 hours after first dose of enoxaparin (Lovenox)  - No booster doses until at least 4 doses of enoxaparin (Lovenox)   o Recheck INR ~5 days after resuming warfarin   ?       Plan routed to referring provider for approval  ?   Amy Ortega [facility]    SUBJECTIVE/OBJECTIVE     Yusef MARSHALL Alvares, a 47 year old male    Goal INR Range: 2.0-3.0     Patient bridged in past: Yes: with acute PE    Wt Readings from Last 3 Encounters:   22 83.7 kg (184 lb 9.6 oz)   21 83.4 kg (183 lb 12.8 oz)   20 84.6 kg (186 lb 6.4 oz)      Ideal body weight: 60.1 kg (132 lb 6.2 oz)  Adjusted ideal body weight: 69.5 kg (153 lb 4.4 oz)     Estimated body mass index is 31.32 kg/m  as calculated from the following:    Height as of 22: 1.635 m (5' 4.37\").    Weight as of 22: 83.7 kg (184 lb 9.6 " oz).    Lab Results   Component Value Date    INR 2.1 (H) 10/20/2022    INR 2.7 (H) 09/08/2022    INR 2.5 (H) 07/28/2022     Lab Results   Component Value Date    HGB 16.6 06/24/2020    HCT 48.8 06/24/2020     06/24/2020     Lab Results   Component Value Date    CR 0.84 09/08/2022    CR 0.95 09/22/2021    CR 1.02 05/28/2021   Calc CrCl 105ml/min

## 2022-12-01 ENCOUNTER — LAB (OUTPATIENT)
Dept: LAB | Facility: CLINIC | Age: 47
End: 2022-12-01
Payer: COMMERCIAL

## 2022-12-01 ENCOUNTER — ANTICOAGULATION THERAPY VISIT (OUTPATIENT)
Dept: ANTICOAGULATION | Facility: CLINIC | Age: 47
End: 2022-12-01

## 2022-12-01 ENCOUNTER — TELEPHONE (OUTPATIENT)
Dept: FAMILY MEDICINE | Facility: CLINIC | Age: 47
End: 2022-12-01

## 2022-12-01 DIAGNOSIS — Z79.01 LONG TERM CURRENT USE OF ANTICOAGULANT THERAPY: ICD-10-CM

## 2022-12-01 DIAGNOSIS — I26.99 PULMONARY EMBOLISM WITHOUT ACUTE COR PULMONALE, UNSPECIFIED CHRONICITY, UNSPECIFIED PULMONARY EMBOLISM TYPE (H): ICD-10-CM

## 2022-12-01 DIAGNOSIS — I26.99 PULMONARY EMBOLISM, UNSPECIFIED CHRONICITY, UNSPECIFIED PULMONARY EMBOLISM TYPE, UNSPECIFIED WHETHER ACUTE COR PULMONALE PRESENT (H): ICD-10-CM

## 2022-12-01 DIAGNOSIS — I26.99 PULMONARY EMBOLISM (H): Primary | ICD-10-CM

## 2022-12-01 DIAGNOSIS — I26.99 PULMONARY EMBOLISM (H): ICD-10-CM

## 2022-12-01 LAB — INR BLD: 1.9 (ref 0.9–1.1)

## 2022-12-01 PROCEDURE — 85610 PROTHROMBIN TIME: CPT

## 2022-12-01 PROCEDURE — 36416 COLLJ CAPILLARY BLOOD SPEC: CPT

## 2022-12-01 RX ORDER — ENOXAPARIN SODIUM 100 MG/ML
1 INJECTION SUBCUTANEOUS EVERY 12 HOURS
Qty: 14.4 ML | Refills: 1 | Status: SHIPPED | OUTPATIENT
Start: 2022-12-01 | End: 2022-12-06

## 2022-12-01 NOTE — PROGRESS NOTES
ANTICOAGULATION MANAGEMENT     Yusef Alvares 47 year old male is on warfarin with subtherapeutic INR result. (Goal INR 2.0-3.0)    Recent labs: (last 7 days)     12/01/22  0927   INR 1.9*       ASSESSMENT       Source(s): Chart Review and Patient/Caregiver Call       Warfarin doses taken: Warfarin taken as instructed    Diet: No new diet changes identified    New illness, injury, or hospitalization: No    Medication/supplement changes: None noted    Signs or symptoms of bleeding or clotting: No    Previous INR: Therapeutic last 2(+) visits    Additional findings: Upcoming surgery/procedure Colonoscopy 12/19     PLAN     Recommended plan for no diet, medication or health factor changes affecting INR     Dosing Instructions: Continue your current warfarin dose with next INR in 2 weeks       Summary  As of 12/1/2022    Full warfarin instructions:  12/15: Hold; 12/16: Hold; 12/17: Hold; 12/18: Hold; Otherwise 5 mg every Sun, Tue, Thu; 7.5 mg all other days; Starting 12/1/2022   Next INR check:  12/14/2022             Telephone call with Kosta who verbalizes understanding and agrees to plan    Lab visit scheduled    Education provided:     Please call back if any changes to your diet, medications or how you've been taking warfarin    Symptom monitoring: monitoring for clotting signs and symptoms    Plan made per [facility] anticoagulation protocol    Thalia Sood RN  Anticoagulation Clinic  12/1/2022    _______________________________________________________________________     Anticoagulation Episode Summary     Current INR goal:  2.0-3.0   TTR:  90.9 % (1 y)   Target end date:  Indefinite   Send INR reminders to:  [facility] [place]    Indications    Pulmonary embolism (H) [I26.99]  Long term current use of anticoagulant therapy [Z79.01]  Pulmonary embolism without acute cor pulmonale  unspecified chronicity  unspecified pulmonary embolism type (H) [I26.99]  Pulmonary embolism  unspecified chronicity  unspecified pulmonary  embolism type  unspecified whether acute cor pulmonale present (H) [I26.99]           Comments:  Hx PE. low protein C levels. Long term anticoagulation per Dr. Romo on 7-9-20         Anticoagulation Care Providers     Provider Role Specialty Phone number    Yusef Katz MD Referring Family Medicine [phone]

## 2022-12-01 NOTE — TELEPHONE ENCOUNTER
Patient Quality Outreach    Patient is due for the following:   Colon Cancer Screening   Pt has colonoscopy scheduled on 12/19/22    Next Steps:   No follow up needed at this time.    Type of outreach:    Chart review performed, no outreach needed.      Questions for provider review:    None     Priscilla Ramirez, CMA

## 2022-12-01 NOTE — TELEPHONE ENCOUNTER
Discussed in detail with patient. Sent MyChart to patient and reviewed plan. Lovenox script sent as pended.  Patient scheduled INR for 12/14 & 12/23. Pt familiar with Lovenox and all questions answered.  Patient verbalizes understanding and agrees to plan. No further questions or concerns.  Thalia Sood RN on 12/1/2022 at 10:13 AM

## 2022-12-01 NOTE — TELEPHONE ENCOUNTER
Lovenox pended to send to preferred pharmacy.    Amy Ortega, PharmD BCACP  Anticoagulation Clinical Pharmacist

## 2022-12-05 ENCOUNTER — TELEPHONE (OUTPATIENT)
Dept: FAMILY MEDICINE | Facility: CLINIC | Age: 47
End: 2022-12-05

## 2022-12-05 DIAGNOSIS — I26.99 PULMONARY EMBOLISM (H): ICD-10-CM

## 2022-12-05 DIAGNOSIS — Z79.01 LONG TERM CURRENT USE OF ANTICOAGULANT THERAPY: ICD-10-CM

## 2022-12-05 DIAGNOSIS — I26.99 PULMONARY EMBOLISM, UNSPECIFIED CHRONICITY, UNSPECIFIED PULMONARY EMBOLISM TYPE, UNSPECIFIED WHETHER ACUTE COR PULMONALE PRESENT (H): ICD-10-CM

## 2022-12-05 DIAGNOSIS — I26.99 PULMONARY EMBOLISM WITHOUT ACUTE COR PULMONALE, UNSPECIFIED CHRONICITY, UNSPECIFIED PULMONARY EMBOLISM TYPE (H): ICD-10-CM

## 2022-12-05 NOTE — TELEPHONE ENCOUNTER
Concerning Lovenox 50 Mg /0.8 ML injuction solution Insurance will only pay for 24 ml in 30 days. They will not do more or less than that.   Please advise  Emili from [employer] in [facility].([email or web address])

## 2022-12-06 RX ORDER — ENOXAPARIN SODIUM 100 MG/ML
1 INJECTION SUBCUTANEOUS EVERY 12 HOURS
Qty: 24 ML | Refills: 0 | Status: SHIPPED | OUTPATIENT
Start: 2022-12-06 | End: 2024-01-03

## 2022-12-06 NOTE — TELEPHONE ENCOUNTER
I reordered the medication as directed by INR clinic however quantity was 24 ml.    Yusef Katz MD  Family Medicine

## 2022-12-07 ENCOUNTER — MYC MEDICAL ADVICE (OUTPATIENT)
Dept: FAMILY MEDICINE | Facility: CLINIC | Age: 47
End: 2022-12-07

## 2022-12-12 ENCOUNTER — TELEPHONE (OUTPATIENT)
Dept: FAMILY MEDICINE | Facility: CLINIC | Age: 47
End: 2022-12-12

## 2022-12-12 NOTE — TELEPHONE ENCOUNTER
Reason for Call:  Other call back    Detailed comments: pt would like call back from inr nurse to discuss future inr lab draw schedule. Please call back to discuss.    Phone Number Patient can be reached at: Cell number on file:    Telephone Information:   Mobile [phone]       Best Time: na    Can we leave a detailed message on this number? Not Applicable    Call taken on 12/12/2022 at 2:05 PM by Sherie Huertas

## 2022-12-12 NOTE — TELEPHONE ENCOUNTER
Writer spoke with patient. Colonoscopy that was scheduled for 12/19/22 was rescheduled for March 2023 as patient tested positive for Covid. Since last INR was subtherapeutic and patient is positive for Covid, will keep INR scheduled on 12/14/22. The INR scheduled on 12/23/22 is not needed as patient will not be holding warfarin and bridging with Lovenox.    Sonja Stone RN, BSN  [facility]  [phone]

## 2022-12-20 ENCOUNTER — LAB (OUTPATIENT)
Dept: LAB | Facility: CLINIC | Age: 47
End: 2022-12-20
Payer: COMMERCIAL

## 2022-12-20 ENCOUNTER — ANTICOAGULATION THERAPY VISIT (OUTPATIENT)
Dept: ANTICOAGULATION | Facility: CLINIC | Age: 47
End: 2022-12-20

## 2022-12-20 DIAGNOSIS — Z79.01 LONG TERM CURRENT USE OF ANTICOAGULANT THERAPY: ICD-10-CM

## 2022-12-20 DIAGNOSIS — I26.99 PULMONARY EMBOLISM (H): Primary | ICD-10-CM

## 2022-12-20 DIAGNOSIS — I26.99 PULMONARY EMBOLISM, UNSPECIFIED CHRONICITY, UNSPECIFIED PULMONARY EMBOLISM TYPE, UNSPECIFIED WHETHER ACUTE COR PULMONALE PRESENT (H): ICD-10-CM

## 2022-12-20 DIAGNOSIS — I26.99 PULMONARY EMBOLISM WITHOUT ACUTE COR PULMONALE, UNSPECIFIED CHRONICITY, UNSPECIFIED PULMONARY EMBOLISM TYPE (H): ICD-10-CM

## 2022-12-20 DIAGNOSIS — I26.99 PULMONARY EMBOLISM (H): ICD-10-CM

## 2022-12-20 LAB — INR BLD: 2.8 (ref 0.9–1.1)

## 2022-12-20 PROCEDURE — 36416 COLLJ CAPILLARY BLOOD SPEC: CPT

## 2022-12-20 PROCEDURE — 85610 PROTHROMBIN TIME: CPT

## 2022-12-20 NOTE — PROGRESS NOTES
ANTICOAGULATION MANAGEMENT     Yusef Alvares 47 year old male is on warfarin with therapeutic INR result. (Goal INR 2.0-3.0)    Recent labs: (last 7 days)     12/20/22  1017   INR 2.8*       ASSESSMENT       Source(s): Chart Review and Patient/Caregiver Call       Warfarin doses taken: Warfarin taken as instructed    Diet: No new diet changes identified    New illness, injury, or hospitalization: Yes: covid positive 12-7 (asymptomatic)    Medication/supplement changes: None noted    Signs or symptoms of bleeding or clotting: No    Previous INR: Subtherapeutic    Additional findings: Upcoming surgery/procedure 3-20-23 colonoscopy (was rescheduled from 12-19-22 due to covid)       PLAN     Recommended plan for no diet, medication or health factor changes affecting INR     Dosing Instructions: Continue your current warfarin dose with next INR in 4 weeks       Summary  As of 12/20/2022    Full warfarin instructions:  5 mg every Sun, Tue, Thu; 7.5 mg all other days; Starting 12/20/2022   Next INR check:  1/17/2023             Telephone call with Kosta who verbalizes understanding and agrees to plan    Lab visit scheduled    Education provided:     Please call back if any changes to your diet, medications or how you've been taking warfarin    Contact [phone]  with any changes, questions or concerns.     Plan made per [facility] anticoagulation protocol    Suzie Fish RN  Anticoagulation Clinic  12/20/2022    _______________________________________________________________________     Anticoagulation Episode Summary     Current INR goal:  2.0-3.0   TTR:  90.3 % (1 y)   Target end date:  Indefinite   Send INR reminders to:  [facility]    Indications    Pulmonary embolism (H) [I26.99]  Long term current use of anticoagulant therapy [Z79.01]  Pulmonary embolism without acute cor pulmonale  unspecified chronicity  unspecified pulmonary embolism type (H) [I26.99]  Pulmonary embolism  unspecified chronicity  unspecified  pulmonary embolism type  unspecified whether acute cor pulmonale present (H) [I26.99]           Comments:  Hx PE. low protein C levels. Long term anticoagulation per Dr. Romo on 7-9-20         Anticoagulation Care Providers     Provider Role Specialty Phone number    Yusef Katz MD Referring Family Medicine [phone]

## 2023-01-17 ENCOUNTER — ANTICOAGULATION THERAPY VISIT (OUTPATIENT)
Dept: ANTICOAGULATION | Facility: CLINIC | Age: 48
End: 2023-01-17

## 2023-01-17 ENCOUNTER — LAB (OUTPATIENT)
Dept: LAB | Facility: CLINIC | Age: 48
End: 2023-01-17
Payer: COMMERCIAL

## 2023-01-17 ENCOUNTER — TELEPHONE (OUTPATIENT)
Dept: FAMILY MEDICINE | Facility: CLINIC | Age: 48
End: 2023-01-17

## 2023-01-17 DIAGNOSIS — I26.99 PULMONARY EMBOLISM (H): ICD-10-CM

## 2023-01-17 DIAGNOSIS — I26.99 PULMONARY EMBOLISM WITHOUT ACUTE COR PULMONALE, UNSPECIFIED CHRONICITY, UNSPECIFIED PULMONARY EMBOLISM TYPE (H): ICD-10-CM

## 2023-01-17 DIAGNOSIS — Z79.01 LONG TERM CURRENT USE OF ANTICOAGULANT THERAPY: ICD-10-CM

## 2023-01-17 DIAGNOSIS — I26.99 PULMONARY EMBOLISM, UNSPECIFIED CHRONICITY, UNSPECIFIED PULMONARY EMBOLISM TYPE, UNSPECIFIED WHETHER ACUTE COR PULMONALE PRESENT (H): ICD-10-CM

## 2023-01-17 DIAGNOSIS — I26.99 PULMONARY EMBOLISM (H): Primary | ICD-10-CM

## 2023-01-17 LAB — INR BLD: 3.3 (ref 0.9–1.1)

## 2023-01-17 PROCEDURE — 36416 COLLJ CAPILLARY BLOOD SPEC: CPT

## 2023-01-17 PROCEDURE — 85610 PROTHROMBIN TIME: CPT

## 2023-01-17 NOTE — PROGRESS NOTES
ANTICOAGULATION MANAGEMENT     Yusef Alvares 47 year old male is on warfarin with supratherapeutic INR result. (Goal INR 2.0-3.0)    Recent labs: (last 7 days)     01/17/23  0908   INR 3.3*       ASSESSMENT       Source(s): Chart Review and Patient/Caregiver Call       Warfarin doses taken: Warfarin taken as instructed    Diet: No new diet changes identified    New illness, injury, or hospitalization: No    Medication/supplement changes: None noted    Signs or symptoms of bleeding or clotting: No    Previous INR: Therapeutic last visit; previously outside of goal range    Additional findings: None       PLAN     Recommended plan for no diet, medication or health factor changes affecting INR     Dosing Instructions: decrease your warfarin dose (5% change) with next INR in 2 weeks       Summary  As of 1/17/2023    Full warfarin instructions:  7.5 mg every Mon, Wed, Fri; 5 mg all other days   Next INR check:  1/31/2023             Telephone call with Kosta who verbalizes understanding and agrees to plan    Lab visit scheduled    Education provided:     Contact [phone]  with any changes, questions or concerns.     Plan made per [facility] anticoagulation protocol    Sonja Stone, RN  Anticoagulation Clinic  1/17/2023    _______________________________________________________________________     Anticoagulation Episode Summary     Current INR goal:  2.0-3.0   TTR:  89.1 % (1 y)   Target end date:  Indefinite   Send INR reminders to:  [facility]    Indications    Pulmonary embolism (H) [I26.99]  Long term current use of anticoagulant therapy [Z79.01]  Pulmonary embolism without acute cor pulmonale  unspecified chronicity  unspecified pulmonary embolism type (H) [I26.99]  Pulmonary embolism  unspecified chronicity  unspecified pulmonary embolism type  unspecified whether acute cor pulmonale present (H) [I26.99]           Comments:  Hx PE. low protein C levels. Long term anticoagulation per Dr. Romo on 7-9-20          Anticoagulation Care Providers     Provider Role Specialty Phone number    Yusef Katz MD Referring Family Medicine [phone]

## 2023-01-17 NOTE — PROGRESS NOTES
ANTICOAGULATION MANAGEMENT     Yusef Alvares 47 year old male is on warfarin with supratherapeutic INR result. (Goal INR 2.0-3.0)    Recent labs: (last 7 days)     01/17/23  0908   INR 3.3*       ASSESSMENT       Source(s): Chart Review    Previous INR was Therapeutic last visit; previously outside of goal range    Medication, diet, health changes since last INR chart reviewed; none identified           PLAN     Recommended plan for no diet, medication or health factor changes affecting INR     Dosing Instructions: Continue your current warfarin dose with next INR in 2 weeks       Summary  As of 1/17/2023    Full warfarin instructions:  5 mg every Sun, Tue, Thu; 7.5 mg all other days   Next INR check:  1/31/2023             Sent [employer] message with dosing and follow up instructions  VM left for patient to return call to [facility] or check [employer]    Contact [phone]  to schedule and with any changes, questions or concerns.     Education provided:     Please call back if any changes to your diet, medications or how you've been taking warfarin    Plan made per [facility] anticoagulation protocol    Sonja Stone RN  Anticoagulation Clinic  1/17/2023    _______________________________________________________________________     Anticoagulation Episode Summary     Current INR goal:  2.0-3.0   TTR:  89.1 % (1 y)   Target end date:  Indefinite   Send INR reminders to:  [facility]    Indications    Pulmonary embolism (H) [I26.99]  Long term current use of anticoagulant therapy [Z79.01]  Pulmonary embolism without acute cor pulmonale  unspecified chronicity  unspecified pulmonary embolism type (H) [I26.99]  Pulmonary embolism  unspecified chronicity  unspecified pulmonary embolism type  unspecified whether acute cor pulmonale present (H) [I26.99]           Comments:  Hx PE. low protein C levels. Long term anticoagulation per Dr. Romo on 7-9-20         Anticoagulation Care Providers     Provider Role Specialty Phone number     Yusef Katz MD [facility] [phone]

## 2023-01-31 ENCOUNTER — ANTICOAGULATION THERAPY VISIT (OUTPATIENT)
Dept: ANTICOAGULATION | Facility: CLINIC | Age: 48
End: 2023-01-31

## 2023-01-31 ENCOUNTER — LAB (OUTPATIENT)
Dept: LAB | Facility: CLINIC | Age: 48
End: 2023-01-31
Payer: COMMERCIAL

## 2023-01-31 DIAGNOSIS — I26.99 PULMONARY EMBOLISM (H): Primary | ICD-10-CM

## 2023-01-31 DIAGNOSIS — I26.99 PULMONARY EMBOLISM WITHOUT ACUTE COR PULMONALE, UNSPECIFIED CHRONICITY, UNSPECIFIED PULMONARY EMBOLISM TYPE (H): ICD-10-CM

## 2023-01-31 DIAGNOSIS — I26.99 PULMONARY EMBOLISM, UNSPECIFIED CHRONICITY, UNSPECIFIED PULMONARY EMBOLISM TYPE, UNSPECIFIED WHETHER ACUTE COR PULMONALE PRESENT (H): ICD-10-CM

## 2023-01-31 DIAGNOSIS — Z79.01 LONG TERM CURRENT USE OF ANTICOAGULANT THERAPY: ICD-10-CM

## 2023-01-31 DIAGNOSIS — I26.99 PULMONARY EMBOLISM (H): ICD-10-CM

## 2023-01-31 LAB — INR BLD: 2.5 (ref 0.9–1.1)

## 2023-01-31 PROCEDURE — 36416 COLLJ CAPILLARY BLOOD SPEC: CPT

## 2023-01-31 PROCEDURE — 85610 PROTHROMBIN TIME: CPT

## 2023-01-31 NOTE — PROGRESS NOTES
ANTICOAGULATION MANAGEMENT     Yusef Alvares 47 year old male is on warfarin with therapeutic INR result. (Goal INR 2.0-3.0)    Recent labs: (last 7 days)     01/31/23  0907   INR 2.5*       ASSESSMENT       Source(s): Chart Review and Patient/Caregiver Call       Warfarin doses taken: Warfarin taken as instructed    Diet: No new diet changes identified    New illness, injury, or hospitalization: No    Medication/supplement changes: None noted    Signs or symptoms of bleeding or clotting: No    Previous INR: Supratherapeutic    Additional findings: Upcoming surgery/procedure 3/20/23 Colonoscopy. Procedure instructions in 10/20/22 TE. Procedure was rescheduled from December.       PLAN     Recommended plan for no diet, medication or health factor changes affecting INR     Dosing Instructions: Continue your current warfarin dose with next INR in 3 weeks       Summary  As of 1/31/2023    Full warfarin instructions:  7.5 mg every Mon, Wed, Fri; 5 mg all other days   Next INR check:  2/21/2023             Telephone call with Kosta who verbalizes understanding and agrees to plan    Lab visit scheduled    Education provided:     Please call back if any changes to your diet, medications or how you've been taking warfarin    Contact [phone]  with any changes, questions or concerns.     Plan made per [facility] anticoagulation protocol    Jordan HALL RN  Anticoagulation Clinic  1/31/2023    _______________________________________________________________________     Anticoagulation Episode Summary     Current INR goal:  2.0-3.0   TTR:  87.6 % (1 y)   Target end date:  Indefinite   Send INR reminders to:  [facility]    Indications    Pulmonary embolism (H) [I26.99]  Long term current use of anticoagulant therapy [Z79.01]  Pulmonary embolism without acute cor pulmonale  unspecified chronicity  unspecified pulmonary embolism type (H) [I26.99]  Pulmonary embolism  unspecified chronicity  unspecified pulmonary embolism  type  unspecified whether acute cor pulmonale present (H) [I26.99]           Comments:  Hx PE. low protein C levels. Long term anticoagulation per Dr. Romo on 7-9-20         Anticoagulation Care Providers     Provider Role Specialty Phone number    Yusef Katz MD Referring Family Medicine [phone]

## 2023-02-21 ENCOUNTER — LAB (OUTPATIENT)
Dept: LAB | Facility: CLINIC | Age: 48
End: 2023-02-21
Payer: COMMERCIAL

## 2023-02-21 ENCOUNTER — MYC MEDICAL ADVICE (OUTPATIENT)
Dept: ANTICOAGULATION | Facility: CLINIC | Age: 48
End: 2023-02-21

## 2023-02-21 ENCOUNTER — ANTICOAGULATION THERAPY VISIT (OUTPATIENT)
Dept: ANTICOAGULATION | Facility: CLINIC | Age: 48
End: 2023-02-21

## 2023-02-21 DIAGNOSIS — Z79.01 LONG TERM CURRENT USE OF ANTICOAGULANT THERAPY: ICD-10-CM

## 2023-02-21 DIAGNOSIS — I26.99 PULMONARY EMBOLISM, UNSPECIFIED CHRONICITY, UNSPECIFIED PULMONARY EMBOLISM TYPE, UNSPECIFIED WHETHER ACUTE COR PULMONALE PRESENT (H): ICD-10-CM

## 2023-02-21 DIAGNOSIS — I26.99 PULMONARY EMBOLISM WITHOUT ACUTE COR PULMONALE, UNSPECIFIED CHRONICITY, UNSPECIFIED PULMONARY EMBOLISM TYPE (H): ICD-10-CM

## 2023-02-21 DIAGNOSIS — I26.99 PULMONARY EMBOLISM (H): ICD-10-CM

## 2023-02-21 DIAGNOSIS — I26.99 PULMONARY EMBOLISM (H): Primary | ICD-10-CM

## 2023-02-21 LAB — INR BLD: 2.6 (ref 0.9–1.1)

## 2023-02-21 PROCEDURE — 85610 PROTHROMBIN TIME: CPT

## 2023-02-21 PROCEDURE — 36416 COLLJ CAPILLARY BLOOD SPEC: CPT

## 2023-02-21 NOTE — PROGRESS NOTES
ANTICOAGULATION MANAGEMENT     Yusef Alvares 47 year old male is on warfarin with therapeutic INR result. (Goal INR 2.0-3.0)    Recent labs: (last 7 days)     02/21/23  0851   INR 2.6*       ASSESSMENT       Source(s): Chart Review and Patient/Caregiver Call       Warfarin doses taken: Warfarin taken as instructed    Diet: No new diet changes identified    New illness, injury, or hospitalization: No    Medication/supplement changes: None noted    Signs or symptoms of bleeding or clotting: No    Previous INR: Therapeutic last visit; previously outside of goal range    Additional findings: Upcoming surgery/procedure 3-20-23. updated bridge instructions sent via [facility]. Pt already has lovenox       PLAN     Recommended plan for no diet, medication or health factor changes affecting INR     Dosing Instructions: Continue your current warfarin dose with next INR in 3 weeks       Summary  As of 2/21/2023    Full warfarin instructions:  3/16: Hold; 3/17: Hold; 3/18: Hold; 3/19: Hold; 3/20: Hold; Otherwise 7.5 mg every Mon, Wed, Fri; 5 mg all other days   Next INR check:  3/14/2023             Telephone call with Kosta who verbalizes understanding and agrees to plan    Lab visit scheduled    Education provided:     Please call back if any changes to your diet, medications or how you've been taking warfarin    Contact [phone]  with any changes, questions or concerns.     Plan made per [facility] anticoagulation protocol    Suzie Fish RN  Anticoagulation Clinic  2/21/2023    _______________________________________________________________________     Anticoagulation Episode Summary     Current INR goal:  2.0-3.0   TTR:  87.6 % (1 y)   Target end date:  Indefinite   Send INR reminders to:  [facility]    Indications    Pulmonary embolism (H) [I26.99]  Long term current use of anticoagulant therapy [Z79.01]  Pulmonary embolism without acute cor pulmonale  unspecified chronicity  unspecified pulmonary embolism type  (H) [I26.99]  Pulmonary embolism  unspecified chronicity  unspecified pulmonary embolism type  unspecified whether acute cor pulmonale present (H) [I26.99]           Comments:  Hx PE. low protein C levels. Long term anticoagulation per Dr. Romo on 7-9-20         Anticoagulation Care Providers     Provider Role Specialty Phone number    Yusef Katz MD Referring [facility] Medicine [phone]

## 2023-03-09 ENCOUNTER — TRANSFERRED RECORDS (OUTPATIENT)
Dept: HEALTH INFORMATION MANAGEMENT | Facility: CLINIC | Age: 48
End: 2023-03-09
Payer: COMMERCIAL

## 2023-03-13 ENCOUNTER — MYC MEDICAL ADVICE (OUTPATIENT)
Dept: FAMILY MEDICINE | Facility: CLINIC | Age: 48
End: 2023-03-13
Payer: COMMERCIAL

## 2023-03-13 RX ORDER — BISACODYL 5 MG/1
TABLET, DELAYED RELEASE ORAL
Qty: 4 TABLET | Refills: 0 | Status: SHIPPED | OUTPATIENT
Start: 2023-03-13 | End: 2023-07-31

## 2023-03-13 RX ORDER — RISPERIDONE 0.25 MG/1
0.25 TABLET ORAL PRN
COMMUNITY
Start: 2022-10-10

## 2023-03-14 ENCOUNTER — ANTICOAGULATION THERAPY VISIT (OUTPATIENT)
Dept: ANTICOAGULATION | Facility: CLINIC | Age: 48
End: 2023-03-14

## 2023-03-14 ENCOUNTER — LAB (OUTPATIENT)
Dept: LAB | Facility: CLINIC | Age: 48
End: 2023-03-14
Payer: COMMERCIAL

## 2023-03-14 DIAGNOSIS — I26.99 PULMONARY EMBOLISM (H): Primary | ICD-10-CM

## 2023-03-14 DIAGNOSIS — I26.99 PULMONARY EMBOLISM, UNSPECIFIED CHRONICITY, UNSPECIFIED PULMONARY EMBOLISM TYPE, UNSPECIFIED WHETHER ACUTE COR PULMONALE PRESENT (H): ICD-10-CM

## 2023-03-14 DIAGNOSIS — I26.99 PULMONARY EMBOLISM WITHOUT ACUTE COR PULMONALE, UNSPECIFIED CHRONICITY, UNSPECIFIED PULMONARY EMBOLISM TYPE (H): ICD-10-CM

## 2023-03-14 DIAGNOSIS — I26.99 PULMONARY EMBOLISM (H): ICD-10-CM

## 2023-03-14 DIAGNOSIS — Z79.01 LONG TERM CURRENT USE OF ANTICOAGULANT THERAPY: ICD-10-CM

## 2023-03-14 LAB — INR BLD: 2.4 (ref 0.9–1.1)

## 2023-03-14 PROCEDURE — 85610 PROTHROMBIN TIME: CPT

## 2023-03-14 PROCEDURE — 36416 COLLJ CAPILLARY BLOOD SPEC: CPT

## 2023-03-14 NOTE — PROGRESS NOTES
ANTICOAGULATION MANAGEMENT     Yusef Alvares 47 year old male is on warfarin with therapeutic INR result. (Goal INR 2.0-3.0)    Recent labs: (last 7 days)     03/14/23  0932   INR 2.4*       ASSESSMENT       Source(s): Chart Review and Patient/Caregiver Call       Warfarin doses taken: Warfarin taken as instructed    Diet: No new diet changes identified    New illness, injury, or hospitalization: No    Medication/supplement changes: None noted    Signs or symptoms of bleeding or clotting: No    Previous INR: Therapeutic last 2(+) visits    Additional findings: Upcoming surgery/procedure 3/30- colonoscopy, will be bridging with lovenox (see mychart 2/21)         PLAN     Recommended plan for no diet, medication or health factor changes affecting INR     Dosing Instructions: Cont MD until TH, then hold warfarin for 5 days for procedure- see procedure instructions.  with next INR in 10 days       Summary  As of 3/14/2023    Full warfarin instructions:  3/16: Hold; 3/17: Hold; 3/18: Hold; 3/19: Hold; 3/20: Hold; Otherwise 7.5 mg every Mon, Wed, Fri; 5 mg all other days   Next INR check:  3/24/2023             Telephone call with Kosta who verbalizes understanding and agrees to plan and who agrees to plan and repeated back plan correctly    Lab visit scheduled    Education provided:     Contact [phone]  with any changes, questions or concerns.     Plan made per [facility] anticoagulation protocol    Mary Desai RN  Anticoagulation Clinic  3/14/2023    _______________________________________________________________________     Anticoagulation Episode Summary     Current INR goal:  2.0-3.0   TTR:  87.6 % (1 y)   Target end date:  Indefinite   Send INR reminders to:  [facility]    Indications    Pulmonary embolism (H) [I26.99]  Long term current use of anticoagulant therapy [Z79.01]  Pulmonary embolism without acute cor pulmonale  unspecified chronicity  unspecified pulmonary embolism type (H) [I26.99]  Pulmonary  embolism  unspecified chronicity  unspecified pulmonary embolism type  unspecified whether acute cor pulmonale present (H) [I26.99]           Comments:  Hx PE. low protein C levels.         Anticoagulation Care Providers     Provider Role Specialty Phone number    Yusef Katz MD [facility] Medicine [phone]

## 2023-03-16 ENCOUNTER — ANESTHESIA EVENT (OUTPATIENT)
Dept: GASTROENTEROLOGY | Facility: CLINIC | Age: 48
End: 2023-03-16
Payer: COMMERCIAL

## 2023-03-16 ENCOUNTER — TELEPHONE (OUTPATIENT)
Dept: FAMILY MEDICINE | Facility: CLINIC | Age: 48
End: 2023-03-16
Payer: COMMERCIAL

## 2023-03-16 NOTE — TELEPHONE ENCOUNTER
Patient Returning Call    Reason for call:  Patient has colonoscopy scheduled for 03/20/23 and has some questions for the INR nurse regarding it     Information relayed to patient:  I let the patient know that I would relay this message to the INR clinic and someone would be in touch with him    Patient has additional questions:  No    What are your questions/concerns:      [facility] or would you prefer to receive a phone call?:   Patient would prefer a phone call   Okay to leave a detailed message?: Yes at Cell number on file:    Telephone Information:   Mobile [phone]    ANYTIME BEFORE 1 PM

## 2023-03-16 NOTE — TELEPHONE ENCOUNTER
10:40 AM    Patient has some questions on how the colon prep may effect the medications he takes. Patient also wanted to know if he can eat after the colonoscopy. This needs to be addressed by his PCP and not the INR clinic. Writer will route to the PCP.    Jordan Norman RN, BSN, PHN  Anticoagulation Clinic   [phone]

## 2023-03-16 NOTE — ANESTHESIA PREPROCEDURE EVALUATION
Anesthesia Pre-Procedure Evaluation    Patient: Yusef Alvares   MRN: [id number] : 1975        Procedure : Procedure(s):  COLONOSCOPY          Past Medical History:   Diagnosis Date     Asperger's syndrome      Bipolar affective disorder (H)      Hyperlipidemia      Mental health problem      ABDULKADIR (obstructive sleep apnea) 2008    HST AHI 19.9, Nato 81%     Schizoaffective disorder (H)      Scoliosis 2004     Thyroid disease       Past Surgical History:   Procedure Laterality Date     DENTAL SURGERY      Extraction of abscess tooth     WISDOM ST GUIDEWIRE        No Known Allergies   Social History     Tobacco Use     Smoking status: Never     Smokeless tobacco: Never   Substance Use Topics     Alcohol use: No     Alcohol/week: 0.0 standard drinks      Wt Readings from Last 1 Encounters:   22 83.7 kg (184 lb 9.6 oz)        Anesthesia Evaluation            ROS/MED HX  ENT/Pulmonary:     (+) sleep apnea,     Neurologic:       Cardiovascular:     (+) Dyslipidemia -----    METS/Exercise Tolerance:     Hematologic:     (+) History of blood clots, pt is anticoagulated,     Musculoskeletal: Comment: scoliosis      GI/Hepatic:       Renal/Genitourinary:       Endo:     (+) thyroid problem, hypothyroidism, Obesity,     Psychiatric/Substance Use: Comment: Aspergers    (+) psychiatric history schizophrenia and bipolar     Infectious Disease:       Malignancy:       Other:            Physical Exam    Airway  airway exam normal           Respiratory Devices and Support         Dental       (+) Minor Abnormalities - some fillings, tiny chips      Cardiovascular   cardiovascular exam normal          Pulmonary   pulmonary exam normal                OUTSIDE LABS:  CBC:   Lab Results   Component Value Date    WBC 7.3 2020    WBC 9.3 2019    HGB 16.6 2020    HGB 16.0 2019    HCT 48.8 2020    HCT 48.8 2019     2020     2019     BMP:   Lab Results    Component Value Date     09/08/2022     09/22/2021    POTASSIUM 4.5 09/08/2022    POTASSIUM 3.7 09/22/2021    CHLORIDE 103 09/08/2022    CHLORIDE 105 09/22/2021    CO2 30 (H) 09/08/2022    CO2 30 09/22/2021    BUN 12.5 09/08/2022    BUN 17 09/22/2021    CR 0.84 09/08/2022    CR 0.95 09/22/2021    GLC 96 09/08/2022    GLC 96 09/08/2022     COAGS:   Lab Results   Component Value Date    INR 2.4 (H) 03/14/2023     POC: No results found for: BGM, HCG, HCGS  HEPATIC:   Lab Results   Component Value Date    ALBUMIN 3.5 09/22/2021    PROTTOTAL 6.9 09/22/2021    ALT 28 09/22/2021    AST 18 09/22/2021    GGT 17 02/11/2016    ALKPHOS 54 09/22/2021    BILITOTAL 1.5 (H) 09/22/2021     OTHER:   Lab Results   Component Value Date    A1C 5.4 09/08/2022    GEE 8.8 09/08/2022    LIPASE 159 09/22/2021    AMYLASE 73 10/05/2017    TSH 2.01 09/08/2022    T4 0.92 09/08/2022       Anesthesia Plan    ASA Status:  3   NPO Status:  NPO Appropriate    Anesthesia Type: General.      Maintenance: Balanced.        Consents    Anesthesia Plan(s) and associated risks, benefits, and realistic alternatives discussed. Questions answered and patient/representative(s) expressed understanding.    - Discussed:     - Discussed with:  Patient         Postoperative Care            Comments:                JANIA Lee CRNA

## 2023-03-16 NOTE — TELEPHONE ENCOUNTER
Patient can take his medications during the prep.  There would be minimal effect on his medications.  He will be able to eat after the procedure.  Yusef Katz MD  Family Medicine

## 2023-03-20 ENCOUNTER — ANESTHESIA (OUTPATIENT)
Dept: GASTROENTEROLOGY | Facility: CLINIC | Age: 48
End: 2023-03-20
Payer: COMMERCIAL

## 2023-03-20 ENCOUNTER — TELEPHONE (OUTPATIENT)
Dept: ANTICOAGULATION | Facility: CLINIC | Age: 48
End: 2023-03-20
Payer: COMMERCIAL

## 2023-03-20 ENCOUNTER — DOCUMENTATION ONLY (OUTPATIENT)
Dept: ANTICOAGULATION | Facility: CLINIC | Age: 48
End: 2023-03-20
Payer: COMMERCIAL

## 2023-03-20 ENCOUNTER — HOSPITAL ENCOUNTER (OUTPATIENT)
Facility: CLINIC | Age: 48
Discharge: HOME | End: 2023-03-20
Attending: SURGERY | Admitting: SURGERY
Payer: COMMERCIAL

## 2023-03-20 VITALS
OXYGEN SATURATION: 95 % | TEMPERATURE: 98.5 F | WEIGHT: 184 LBS | HEART RATE: 87 BPM | RESPIRATION RATE: 16 BRPM | DIASTOLIC BLOOD PRESSURE: 91 MMHG | HEIGHT: 64 IN | BODY MASS INDEX: 31.41 KG/M2 | SYSTOLIC BLOOD PRESSURE: 127 MMHG

## 2023-03-20 DIAGNOSIS — I26.99 PULMONARY EMBOLISM, UNSPECIFIED CHRONICITY, UNSPECIFIED PULMONARY EMBOLISM TYPE, UNSPECIFIED WHETHER ACUTE COR PULMONALE PRESENT (H): ICD-10-CM

## 2023-03-20 DIAGNOSIS — I26.99 PULMONARY EMBOLISM (H): Primary | ICD-10-CM

## 2023-03-20 DIAGNOSIS — I26.99 PULMONARY EMBOLISM WITHOUT ACUTE COR PULMONALE, UNSPECIFIED CHRONICITY, UNSPECIFIED PULMONARY EMBOLISM TYPE (H): ICD-10-CM

## 2023-03-20 DIAGNOSIS — Z12.11 SPECIAL SCREENING FOR MALIGNANT NEOPLASMS, COLON: Primary | ICD-10-CM

## 2023-03-20 DIAGNOSIS — Z79.01 LONG TERM CURRENT USE OF ANTICOAGULANT THERAPY: ICD-10-CM

## 2023-03-20 LAB — COLONOSCOPY: NORMAL

## 2023-03-20 PROCEDURE — 258N000003 HC RX IP 258 OP 636: Performed by: NURSE ANESTHETIST, CERTIFIED REGISTERED

## 2023-03-20 PROCEDURE — 250N000009 HC RX 250: Performed by: SURGERY

## 2023-03-20 PROCEDURE — 45378 DIAGNOSTIC COLONOSCOPY: CPT | Performed by: SURGERY

## 2023-03-20 PROCEDURE — 370N000017 HC ANESTHESIA TECHNICAL FEE, PER MIN: Performed by: SURGERY

## 2023-03-20 PROCEDURE — G0121 COLON CA SCRN NOT HI RSK IND: HCPCS | Performed by: SURGERY

## 2023-03-20 PROCEDURE — 250N000009 HC RX 250: Performed by: NURSE ANESTHETIST, CERTIFIED REGISTERED

## 2023-03-20 PROCEDURE — 250N000011 HC RX IP 250 OP 636: Performed by: NURSE ANESTHETIST, CERTIFIED REGISTERED

## 2023-03-20 PROCEDURE — 258N000003 HC RX IP 258 OP 636: Performed by: SURGERY

## 2023-03-20 RX ORDER — LIDOCAINE HYDROCHLORIDE 10 MG/ML
INJECTION, SOLUTION EPIDURAL; INFILTRATION; INTRACAUDAL; PERINEURAL PRN
Status: DISCONTINUED | OUTPATIENT
Start: 2023-03-20 | End: 2023-03-20

## 2023-03-20 RX ORDER — SODIUM CHLORIDE, SODIUM LACTATE, POTASSIUM CHLORIDE, CALCIUM CHLORIDE 600; 310; 30; 20 MG/100ML; MG/100ML; MG/100ML; MG/100ML
INJECTION, SOLUTION INTRAVENOUS CONTINUOUS
Status: DISCONTINUED | OUTPATIENT
Start: 2023-03-20 | End: 2023-03-20 | Stop reason: HOSPADM

## 2023-03-20 RX ORDER — SODIUM CHLORIDE, SODIUM LACTATE, POTASSIUM CHLORIDE, CALCIUM CHLORIDE 600; 310; 30; 20 MG/100ML; MG/100ML; MG/100ML; MG/100ML
INJECTION, SOLUTION INTRAVENOUS CONTINUOUS PRN
Status: DISCONTINUED | OUTPATIENT
Start: 2023-03-20 | End: 2023-03-20

## 2023-03-20 RX ORDER — PROPOFOL 10 MG/ML
INJECTION, EMULSION INTRAVENOUS PRN
Status: DISCONTINUED | OUTPATIENT
Start: 2023-03-20 | End: 2023-03-20

## 2023-03-20 RX ORDER — GLYCOPYRROLATE 0.2 MG/ML
INJECTION, SOLUTION INTRAMUSCULAR; INTRAVENOUS PRN
Status: DISCONTINUED | OUTPATIENT
Start: 2023-03-20 | End: 2023-03-20

## 2023-03-20 RX ORDER — LIDOCAINE 40 MG/G
CREAM TOPICAL
Status: DISCONTINUED | OUTPATIENT
Start: 2023-03-20 | End: 2023-03-20 | Stop reason: HOSPADM

## 2023-03-20 RX ADMIN — SODIUM CHLORIDE, POTASSIUM CHLORIDE, SODIUM LACTATE AND CALCIUM CHLORIDE: 600; 310; 30; 20 INJECTION, SOLUTION INTRAVENOUS at 08:36

## 2023-03-20 RX ADMIN — LIDOCAINE HYDROCHLORIDE 5 ML: 10 INJECTION, SOLUTION EPIDURAL; INFILTRATION; INTRACAUDAL; PERINEURAL at 09:13

## 2023-03-20 RX ADMIN — LIDOCAINE HYDROCHLORIDE 0.2 ML: 10 INJECTION, SOLUTION EPIDURAL; INFILTRATION; INTRACAUDAL; PERINEURAL at 08:36

## 2023-03-20 RX ADMIN — GLYCOPYRROLATE 0.3 MG: 0.2 INJECTION, SOLUTION INTRAMUSCULAR; INTRAVENOUS at 09:13

## 2023-03-20 RX ADMIN — SODIUM CHLORIDE, POTASSIUM CHLORIDE, SODIUM LACTATE AND CALCIUM CHLORIDE: 600; 310; 30; 20 INJECTION, SOLUTION INTRAVENOUS at 09:11

## 2023-03-20 RX ADMIN — PROPOFOL 40 MG: 10 INJECTION, EMULSION INTRAVENOUS at 09:13

## 2023-03-20 ASSESSMENT — ACTIVITIES OF DAILY LIVING (ADL): ADLS_ACUITY_SCORE: 35

## 2023-03-20 NOTE — H&P
[facility]    Pre-Endoscopy History and Physical     Yusef Alvares MRN# [id number]   YOB: 1975 Age: 47 year old     Date of Procedure: 3/20/2023  Primary care provider: Yusef Katz  Type of Endoscopy: Colonoscopy with possible biopsy, possible polypectomy  Reason for Procedure: screening  Type of Anesthesia Anticipated: MAC    HPI:    Yusef is a 47 year old male who will be undergoing the above procedure.  1st screening; hx of PE (on coumadin); no famhx of colon cancer    A history and physical has been performed. The patient's medications and allergies have been reviewed. The risks and benefits of the procedure and the sedation options and risks were discussed with the patient.  All questions were answered and informed consent was obtained.      He denies a personal or family history of anesthesia complications or bleeding disorders.     Patient Active Problem List   Diagnosis     Schizophrenia (H)     Weight gain     Hyperlipidemia LDL goal <100     Acne scarring     Hypothyroidism     TB (tuberculosis) contact     ABDULKADIR (obstructive sleep apnea)     Schizoaffective disorder (H)     CARDIOVASCULAR SCREENING; LDL GOAL LESS THAN 130     Lesions of both ulnar nerves     Right-sided low back pain with right-sided sciatica     Obesity (BMI 30.0-34.9)     Pulmonary embolism (H)     Long term current use of anticoagulant therapy     Pulmonary embolism without acute cor pulmonale, unspecified chronicity, unspecified pulmonary embolism type (H)     Pulmonary embolism, unspecified chronicity, unspecified pulmonary embolism type, unspecified whether acute cor pulmonale present (H)        Past Medical History:   Diagnosis Date     Asperger's syndrome 2009     Bipolar affective disorder (H)      Hyperlipidemia      Mental health problem      ABDULKADIR (obstructive sleep apnea) 2008 / 2015    HST AHI 19.9, Nato 81%     Schizoaffective disorder (H)      Scoliosis 2004     Thyroid disease          Past Surgical History:   Procedure Laterality Date     DENTAL SURGERY      Extraction of abscess tooth     WISDOM ST GUIDEWIRE         Social History     Tobacco Use     Smoking status: Never     Smokeless tobacco: Never   Substance Use Topics     Alcohol use: No     Alcohol/week: 0.0 standard drinks       Family History   Problem Relation Age of Onset     Psychotic Disorder Mother         pre-schizophrenia     Breast Cancer Mother      Respiratory Mother         copd.   Sleep apnea     Cardiovascular Mother         aneurysms x 2     Hypertension Mother      Hyperlipidemia Mother      Heart Disease Mother         CHF     Alcohol/Drug Father      Respiratory Father         copd     Asthma Father      Gastrointestinal Disease Father         appendectomy     Hypertension Father      Hyperlipidemia Father      Lymphoma Father      Alcohol/Drug Maternal Grandmother         cirrhosis of liver     Depression Maternal Grandfather      Arthritis Maternal Grandfather         hip replacement     Alzheimer Disease Maternal Grandfather      Cancer Paternal Grandmother      Asthma Brother      Respiratory Brother         copd     Hernia Brother        Prior to Admission medications    Medication Sig Start Date End Date Taking? Authorizing Provider   acetaminophen (TYLENOL) 325 MG tablet Take 2 tablets (650 mg) by mouth every 4 hours as needed for mild pain 9/26/19  Yes Ortiz Valdivia MD   ARIPiprazole (ABILIFY) 20 MG tablet Take 20 mg by mouth daily 9/19/19  Yes Reported, Patient   atomoxetine (STRATTERA) 80 MG capsule Take 80 mg by mouth daily   Yes Reported, Patient   bisacodyl (DULCOLAX) 5 MG EC tablet Take 2 tablets at 3 pm the day before your procedure. If your procedure is before 11 am, take 2 additional tablets at 11 pm. If your procedure is after 11 am, take 2 additional tablets at 6 am. For additional instructions refer to your colonoscopy prep instructions. 3/13/23  Yes Enid Sears MD   citalopram (CELEXA) 40  MG tablet Take 40 mg by mouth daily.   Yes Reported, Patient   enoxaparin ANTICOAGULANT (LOVENOX) 80 MG/0.8ML syringe Inject 0.8 mLs (80 mg) Subcutaneous every 12 hours As directed by INR clinic 12/6/22  Yes Yusef Katz MD   hydrOXYzine (ATARAX) 50 MG tablet Take 50 mg by mouth 3 times daily as needed 9/19/19  Yes Reported, Patient   levothyroxine (SYNTHROID/LEVOTHROID) 50 MCG tablet Take 1 tablet (50 mcg) by mouth daily 7/18/22  Yes Yusef Katz MD   naltrexone (DEPADE;REVIA) 50 MG tablet Take 50 mg by mouth daily   Yes Reported, Patient   polyethylene glycol (GOLYTELY) 236 g suspension The night before the exam at 6 pm drink an 8-ounce glass every 15 minutes until the jug is half empty. If you arrive before 11 AM: Drink the other half of the Golytely jug at 11 PM night before procedure. If you arrive after 11 AM: Drink the other half of the Golytely jug at 6 AM day of procedure. For additional instructions refer to your colonoscopy prep instructions. 3/13/23  Yes Enid Sears MD   risperiDONE (RISPERDAL) 0.25 MG tablet  10/10/22  Yes Reported, Patient   simvastatin (ZOCOR) 20 MG tablet TAKE 1 TABLET BY MOUTH AT BEDTIME ALONG WITH 40MG TO EQUAL 60MG. 7/18/22  Yes Yusef Katz MD   simvastatin (ZOCOR) 40 MG tablet TAKE 1 TABLET BY MOUTH DAILY ALONG WITH 20MG TO EQUAL 60MG. 7/18/22  Yes Yusef Katz MD   triamcinolone (KENALOG) 0.1 % external cream APPLY TOPICALLY TWICE A DAY AS NEEDED ON HANDS & RIGHT LEG 5/28/21  Yes Yusef Katz MD   warfarin ANTICOAGULANT (COUMADIN) 5 MG tablet TAKE 5 MG BY MOUTH EVERY SUNDAY, TUESDAY, THURSDAY ; TAKE 7.5 MG ALL OTHER DAYS OR AS DIRECTED BY 11/21/22  Yes Yusef Katz MD       No Known Allergies     REVIEW OF SYSTEMS:   5 point ROS negative except as noted above in HPI, including Gen., Resp., CV, GI &  system review.    PHYSICAL EXAM:   BP (!) 145/89 (BP Location: Right arm)   Pulse 89   Temp 98.5  F (36.9  C) (Oral)   Resp 20    "Ht 1.635 m (5' 4.37\")   Wt 83.5 kg (184 lb)   SpO2 96%   BMI 31.22 kg/m   Estimated body mass index is 31.22 kg/m  as calculated from the following:    Height as of this encounter: 1.635 m (5' 4.37\").    Weight as of this encounter: 83.5 kg (184 lb).   Constitutional: Awake, alert, no acute distress.  Eyes: No scleral icterus.  Conjunctiva are without injection.  ENMT: Mucous membranes moist, dentition and gums are intact.   Neck: Soft, supple, trachea midline.    Endocrine: n/a   Lymphatic: There is no cervical, submandibularadenopathy.  Respiratory: normal effortgs   Cardiovascular: S1, S2  Abdomen: Non-distended, non-tender,  No masses,  Musculoskeletal: No spinal or CVA tenderness. Full range of motion in the upper and lower extremities.    Skin: No skin rashes or lesions to inspection.  No petechia.    Neurologic: alerted and oriented 3x  Psychiatric: The patient's affect is not blunted and mood is appropriate.  DIAGNOSTICS:    Not indicated    IMPRESSION   ASA Class 3 - Severe systemic disease, but not incapacitating    PLAN:   Plan for Colonoscopy with possible biopsy, possible polypectomy. We discussed the risks, benefits and alternatives and the patient wished to proceed.  Patient is cleared for the above procedure.    The above has been forwarded to the consulting provider.    [facility], [facility]        "

## 2023-03-20 NOTE — ANESTHESIA POSTPROCEDURE EVALUATION
Patient: Yusef Alvares    Procedure: Procedure(s):  COLONOSCOPY       Anesthesia Type:  General    Note:  Disposition: Outpatient   Postop Pain Control: Uneventful            Sign Out: Well controlled pain   PONV: No   Neuro/Psych: Uneventful            Sign Out: Acceptable/Baseline neuro status   Airway/Respiratory: Uneventful            Sign Out: Acceptable/Baseline resp. status   CV/Hemodynamics: Uneventful            Sign Out: Acceptable CV status; No obvious hypovolemia; No obvious fluid overload   Other NRE: NONE   DID A NON-ROUTINE EVENT OCCUR? No           Last vitals:  Vitals:    03/20/23 0807   BP: (!) 145/89   Pulse: 89   Resp: 20   Temp: 36.9  C (98.5  F)   SpO2: 96%       Electronically Signed By: Latesha Velasquez CRNA, APRN CRNA  March 20, 2023  9:34 AM

## 2023-03-20 NOTE — ANESTHESIA CARE TRANSFER NOTE
Patient: Yusef Alvares    Procedure: Procedure(s):  COLONOSCOPY       Diagnosis: Screen for colon cancer [Z12.11]  Diagnosis Additional Information: No value filed.    Anesthesia Type:   General     Note:    Oropharynx: oropharynx clear of all foreign objects and spontaneously breathing  Level of Consciousness: awake and drowsy      Independent Airway: airway patency satisfactory and stable  Dentition: dentition unchanged  Vital Signs Stable: post-procedure vital signs reviewed and stable  Report to RN Given: handoff report given  Patient transferred to: Phase II    Handoff Report: Identifed the Patient, Identified the Reponsible Provider, Reviewed the pertinent medical history, Discussed the surgical course, Reviewed Intra-OP anesthesia mangement and issues during anesthesia, Set expectations for post-procedure period and Allowed opportunity for questions and acknowledgement of understanding      Vitals:  Vitals Value Taken Time   BP     Temp     Pulse     Resp     SpO2         Electronically Signed By: Latesha Velasquez CRNA, APRN CRNA  March 20, 2023  9:34 AM

## 2023-03-20 NOTE — TELEPHONE ENCOUNTER
Talked with Kosta, to start Lovenox tomorrow and then 4 hours after start warfarin. Per bridge plan.

## 2023-03-20 NOTE — TELEPHONE ENCOUNTER
FYI - Status Update    Who is Calling: patient    Update: Patient had a colonoscopy and is looking for dosing instructions.    Does caller want a call/response back: Yes     Could we send this information to you in [employer] or would you prefer to receive a phone call?:   Patient would prefer a phone call   Okay to leave a detailed message?: Yes at Cell number on file:    Telephone Information:   Mobile [phone]

## 2023-03-20 NOTE — PROGRESS NOTES
ANTICOAGULATION  MANAGEMENT: Discharge Review    Yusef Alvares chart reviewed for anticoagulation continuity of care    Outpatient surgery/procedure on 3/20/23 for Colonoscopy.    Discharge disposition: Home    Results:    Recent labs: (last 7 days)     03/14/23  0932   INR 2.4*     Anticoagulation inpatient management:     not applicable     Anticoagulation discharge instructions:     Warfarin dosing: bridging instructions were already given to patient prior to procedure by [facility]. Patient is to resume lovenox tomorrow, then resume warfarin at least 4 hours after lovenox   Bridging: bridging with enoxaparin (Lovenox)   INR goal change: No      Medication changes affecting anticoagulation: No    Additional factors affecting anticoagulation: No     PLAN     No adjustment to anticoagulation plan needed    Recommended follow up is scheduled  Patient not contacted    No adjustment to Anticoagulation Calendar was required    Sonja Stone RN

## 2023-03-22 NOTE — TELEPHONE ENCOUNTER
Pt is calling back to get an update on his next dose. Please call him back ASAP.  Call [phone], ok to M

## 2023-03-22 NOTE — TELEPHONE ENCOUNTER
Patient has questions as to what he is supposed to do for his next dose. Please call. [phone]

## 2023-03-22 NOTE — TELEPHONE ENCOUNTER
Reviewed dosing and bridge plan with patient.  Patient verbalizes understanding and agrees to plan. No further questions or concerns.  Thalia Sood RN on 3/22/2023 at 10:52 AM

## 2023-03-24 ENCOUNTER — LAB (OUTPATIENT)
Dept: LAB | Facility: CLINIC | Age: 48
End: 2023-03-24
Payer: COMMERCIAL

## 2023-03-24 ENCOUNTER — ANTICOAGULATION THERAPY VISIT (OUTPATIENT)
Dept: ANTICOAGULATION | Facility: CLINIC | Age: 48
End: 2023-03-24

## 2023-03-24 DIAGNOSIS — I26.99 PULMONARY EMBOLISM WITHOUT ACUTE COR PULMONALE, UNSPECIFIED CHRONICITY, UNSPECIFIED PULMONARY EMBOLISM TYPE (H): ICD-10-CM

## 2023-03-24 DIAGNOSIS — Z79.01 LONG TERM CURRENT USE OF ANTICOAGULANT THERAPY: ICD-10-CM

## 2023-03-24 DIAGNOSIS — I26.99 PULMONARY EMBOLISM (H): ICD-10-CM

## 2023-03-24 DIAGNOSIS — I26.99 PULMONARY EMBOLISM, UNSPECIFIED CHRONICITY, UNSPECIFIED PULMONARY EMBOLISM TYPE, UNSPECIFIED WHETHER ACUTE COR PULMONALE PRESENT (H): ICD-10-CM

## 2023-03-24 DIAGNOSIS — I26.99 PULMONARY EMBOLISM (H): Primary | ICD-10-CM

## 2023-03-24 LAB — INR BLD: 1.4 (ref 0.9–1.1)

## 2023-03-24 PROCEDURE — 36416 COLLJ CAPILLARY BLOOD SPEC: CPT

## 2023-03-24 PROCEDURE — 85610 PROTHROMBIN TIME: CPT

## 2023-03-24 NOTE — PROGRESS NOTES
ANTICOAGULATION MANAGEMENT     Yusef Alvares 47 year old male is on warfarin with subtherapeutic INR result. (Goal INR 2.0-3.0)    Recent labs: (last 7 days)     03/24/23  0946   INR 1.4*       ASSESSMENT     Warfarin Lab Questionnaire    Dose in Tablet or Mg 3/23/2023   TAB or MG? milligram (mg)     Pt Rptd Dose ALEC MONDAY TUESDAY WEDNESDAY THURSDAY   3/23/2023 0 0 5 7.5 5     Warfarin Lab Questionnaire 3/23/2023   Missed doses? No   Medication changes? No   Abnormal bleeding? No   Shortness of breath? No   Injuries or illness since last INR? No   Changes in diet or alcohol? No   Upcoming surgery, procedure? No   Best number to call with results? [phone]       Additional findings: Bridging with Enoxaparin until INR >= 2.0       PLAN     Recommended plan for temporary change(s) affecting INR     Dosing Instructions: booster dose then continue your current warfarin dose Continue bridging with Enoxaparin with next INR in 3 days       Summary  As of 3/24/2023    Full warfarin instructions:  3/24: 10 mg; Otherwise 7.5 mg every Mon, Wed, Fri; 5 mg all other days   Next INR check:  3/27/2023             Telephone call with Kosta who verbalizes understanding and agrees to plan    Lab visit scheduled    Education provided:     Please call back if any changes to your diet, medications or how you've been taking warfarin    Contact [phone]  with any changes, questions or concerns.     Plan made per [facility] anticoagulation protocol    Suzie Fish RN  Anticoagulation Clinic  3/24/2023    _______________________________________________________________________     Anticoagulation Episode Summary     Current INR goal:  2.0-3.0   TTR:  86.0 % (1 y)   Target end date:  Indefinite   Send INR reminders to:  [facility]    Indications    Pulmonary embolism (H) [I26.99]  Long term current use of anticoagulant therapy [Z79.01]  Pulmonary embolism without acute cor pulmonale  unspecified chronicity  unspecified pulmonary  Goal Outcome Evaluation:    VSS on RA. No A/B spells. No desaturations. Pt has occasional strider not associated with feeds. Pt tolerated Q3 feed via gavage, not cuing. Very sleepy overnight. Voiding, no stool. Weight up 45g.       embolism type (H) [I26.99]  Pulmonary embolism  unspecified chronicity  unspecified pulmonary embolism type  unspecified whether acute cor pulmonale present (H) [I26.99]           Comments:  Hx PE. low protein C levels.         Anticoagulation Care Providers     Provider Role Specialty Phone number    Yusef Katz MD [facility] Family Medicine [phone]

## 2023-03-27 ENCOUNTER — TELEPHONE (OUTPATIENT)
Dept: FAMILY MEDICINE | Facility: CLINIC | Age: 48
End: 2023-03-27

## 2023-03-27 NOTE — TELEPHONE ENCOUNTER
Reason for Call:  Other call back    Detailed comments: Patient needs to know about continuing Enoxaparin ANTICOAGULANT (LOVENOX) 80 MG/0.8ML syringe (his primary concern), and Warfarin ANTICOAGULANT (COUMADIN) 5 MG tablet for today. He is stuck out of town and had reschedule 2/27 INR to tomorrow 2/28, not sure how manage meds for tonight.    Phone Number Patient can be reached at: Cell number on file:    Telephone Information:   Mobile [phone]     Best Time: ASAP    Can we leave a detailed message on this number? YES    Call taken on 3/27/2023 at 9:26 AM by Laura Kanavel

## 2023-03-27 NOTE — TELEPHONE ENCOUNTER
Writer spoke with patient, he is unable to have INR checked today as is not able to get back to town until tomorrow. INR is already rescheduled. Advised to continue Lovenox as directed and to take his usual dose of warfarin 7.5 mg this evening and recheck INR tomorrow as scheduled, with [facility] to follow up with further dosing once INR is completed. Patient verbalized understanding, no further questions or concerns reported.    Sonja Stone RN, BSN  [facility] Anticoagulation Clinic  [phone]       Cosentyx Counseling:  I discussed with the patient the risks of Cosentyx including but not limited to worsening of Crohn's disease, immunosuppression, allergic reactions and infections.  The patient understands that monitoring is required including a PPD at baseline and must alert us or the primary physician if symptoms of infection or other concerning signs are noted.

## 2023-03-28 ENCOUNTER — LAB (OUTPATIENT)
Dept: LAB | Facility: CLINIC | Age: 48
End: 2023-03-28
Payer: COMMERCIAL

## 2023-03-28 ENCOUNTER — ANTICOAGULATION THERAPY VISIT (OUTPATIENT)
Dept: ANTICOAGULATION | Facility: CLINIC | Age: 48
End: 2023-03-28

## 2023-03-28 DIAGNOSIS — I26.99 PULMONARY EMBOLISM (H): ICD-10-CM

## 2023-03-28 DIAGNOSIS — Z79.01 LONG TERM CURRENT USE OF ANTICOAGULANT THERAPY: ICD-10-CM

## 2023-03-28 DIAGNOSIS — I26.99 PULMONARY EMBOLISM (H): Primary | ICD-10-CM

## 2023-03-28 DIAGNOSIS — I26.99 PULMONARY EMBOLISM WITHOUT ACUTE COR PULMONALE, UNSPECIFIED CHRONICITY, UNSPECIFIED PULMONARY EMBOLISM TYPE (H): ICD-10-CM

## 2023-03-28 DIAGNOSIS — I26.99 PULMONARY EMBOLISM, UNSPECIFIED CHRONICITY, UNSPECIFIED PULMONARY EMBOLISM TYPE, UNSPECIFIED WHETHER ACUTE COR PULMONALE PRESENT (H): ICD-10-CM

## 2023-03-28 LAB — INR BLD: 1.5 (ref 0.9–1.1)

## 2023-03-28 PROCEDURE — 85610 PROTHROMBIN TIME: CPT

## 2023-03-28 PROCEDURE — 36416 COLLJ CAPILLARY BLOOD SPEC: CPT

## 2023-03-28 NOTE — PROGRESS NOTES
ANTICOAGULATION MANAGEMENT     Yusef Alvares 47 year old male is on warfarin with subtherapeutic INR result. (Goal INR 2.0-3.0)    Recent labs: (last 7 days)     03/28/23  1524   INR 1.5*       ASSESSMENT     Warfarin Lab Questionnaire    Dose in Tablet or Mg 3/27/2023   TAB or MG? milligram (mg)     Pt Rptd Dose ALEC MONDAY TUESDAY WEDNESDAY THURSDAY   3/27/2023 5 7.5 - - -     Warfarin Lab Questionnaire 3/27/2023   Missed doses? No   Medication changes? No   Abnormal bleeding? No   Shortness of breath? No   Injuries or illness since last INR? No   Changes in diet or alcohol? No   Upcoming surgery, procedure? No   Best number to call with results? [phone]       Additional findings: Bridging with Enoxaparin until INR >= 2.0       PLAN     Recommended plan for temporary change(s) affecting INR     Dosing Instructions: booster dose then continue your current warfarin dose Continue bridging with Enoxaparin with next INR in 3 days       Summary  As of 3/28/2023    Full warfarin instructions:  3/28: 15 mg; Otherwise 7.5 mg every Mon, Wed, Fri; 5 mg all other days   Next INR check:  3/31/2023             Telephone call with Kosta who verbalizes understanding and agrees to plan    Lab visit scheduled    Education provided:     Goal range and lab monitoring: goal range and significance of current result    Plan made with [facility] Pharmacist Amy Dwyer, KARTHIK  Anticoagulation Clinic  3/28/2023    _______________________________________________________________________     Anticoagulation Episode Summary     Current INR goal:  2.0-3.0   TTR:  84.9 % (1 y)   Target end date:  Indefinite   Send INR reminders to:  [facility]    Indications    Pulmonary embolism (H) [I26.99]  Long term current use of anticoagulant therapy [Z79.01]  Pulmonary embolism without acute cor pulmonale  unspecified chronicity  unspecified pulmonary embolism type (H) [I26.99]  Pulmonary embolism  unspecified  chronicity  unspecified pulmonary embolism type  unspecified whether acute cor pulmonale present (H) [I26.99]           Comments:  Hx PE. low protein C levels.         Anticoagulation Care Providers     Provider Role Specialty Phone number    Yusef Katz MD [facility] Medicine [phone]

## 2023-03-31 ENCOUNTER — LAB (OUTPATIENT)
Dept: LAB | Facility: CLINIC | Age: 48
End: 2023-03-31
Payer: COMMERCIAL

## 2023-03-31 ENCOUNTER — ANTICOAGULATION THERAPY VISIT (OUTPATIENT)
Dept: ANTICOAGULATION | Facility: CLINIC | Age: 48
End: 2023-03-31

## 2023-03-31 DIAGNOSIS — I26.99 PULMONARY EMBOLISM, UNSPECIFIED CHRONICITY, UNSPECIFIED PULMONARY EMBOLISM TYPE, UNSPECIFIED WHETHER ACUTE COR PULMONALE PRESENT (H): ICD-10-CM

## 2023-03-31 DIAGNOSIS — Z79.01 LONG TERM CURRENT USE OF ANTICOAGULANT THERAPY: ICD-10-CM

## 2023-03-31 DIAGNOSIS — I26.99 PULMONARY EMBOLISM (H): ICD-10-CM

## 2023-03-31 DIAGNOSIS — I26.99 PULMONARY EMBOLISM WITHOUT ACUTE COR PULMONALE, UNSPECIFIED CHRONICITY, UNSPECIFIED PULMONARY EMBOLISM TYPE (H): ICD-10-CM

## 2023-03-31 DIAGNOSIS — I26.99 PULMONARY EMBOLISM (H): Primary | ICD-10-CM

## 2023-03-31 LAB — INR BLD: 1.9 (ref 0.9–1.1)

## 2023-03-31 PROCEDURE — 36416 COLLJ CAPILLARY BLOOD SPEC: CPT

## 2023-03-31 PROCEDURE — 85610 PROTHROMBIN TIME: CPT

## 2023-03-31 NOTE — PROGRESS NOTES
ANTICOAGULATION MANAGEMENT     Yusef Alvares 47 year old male is on warfarin with subtherapeutic INR result. (Goal INR 2.0-3.0)    Recent labs: (last 7 days)     03/31/23  1029   INR 1.9*       ASSESSMENT     Warfarin Lab Questionnaire    Dose in Tablet or Mg 3/30/2023   TAB or MG? milligram (mg)     Pt Rptd Dose ALEC MONDAY TUESDAY WEDNESDAY THURSDAY   3/30/2023 5 7.5 15 7.5 5     Warfarin Lab Questionnaire 3/30/2023   Missed doses? No   Medication changes? No   Abnormal bleeding? No   Shortness of breath? No   Injuries or illness since last INR? No   Changes in diet or alcohol? No   Upcoming surgery, procedure? No   Best number to call with results? [phone]     Additional findings: Bridging with Enoxaparin until INR >= 2.0     PLAN     Recommended plan for temporary change(s) affecting INR     Dosing Instructions: Continue your current warfarin dose with next INR in 3 days    Continue bridging with Lovenox q 12 hours - pt confirms he has enough syringes     Summary  As of 3/31/2023    Full warfarin instructions:  7.5 mg every Mon, Wed, Fri; 5 mg all other days   Next INR check:  4/3/2023             Telephone call with Kosta who verbalizes understanding and agrees to plan    Lab visit scheduled    Education provided:     Please call back if any changes to your diet, medications or how you've been taking warfarin    Symptom monitoring: monitoring for clotting signs and symptoms    Plan made with Amy Ortega [facility]    Thalia Sood RN  Anticoagulation Clinic  3/31/2023    _______________________________________________________________________     Anticoagulation Episode Summary     Current INR goal:  2.0-3.0   TTR:  84.1 % (1 y)   Target end date:  Indefinite   Send INR reminders to:  [facility]    Indications    Pulmonary embolism (H) [I26.99]  Long term current use of anticoagulant therapy [Z79.01]  Pulmonary embolism without acute cor pulmonale  unspecified chronicity  unspecified pulmonary  embolism type (H) [I26.99]  Pulmonary embolism  unspecified chronicity  unspecified pulmonary embolism type  unspecified whether acute cor pulmonale present (H) [I26.99]           Comments:  Hx PE. low protein C levels.         Anticoagulation Care Providers     Provider Role Specialty Phone number    Yusef Katz MD [facility] [phone]

## 2023-04-01 ENCOUNTER — MYC MEDICAL ADVICE (OUTPATIENT)
Dept: FAMILY MEDICINE | Facility: CLINIC | Age: 48
End: 2023-04-01
Payer: COMMERCIAL

## 2023-04-03 ENCOUNTER — TELEPHONE (OUTPATIENT)
Dept: FAMILY MEDICINE | Facility: CLINIC | Age: 48
End: 2023-04-03

## 2023-04-03 ENCOUNTER — LAB (OUTPATIENT)
Dept: LAB | Facility: CLINIC | Age: 48
End: 2023-04-03
Payer: COMMERCIAL

## 2023-04-03 ENCOUNTER — ANTICOAGULATION THERAPY VISIT (OUTPATIENT)
Dept: ANTICOAGULATION | Facility: CLINIC | Age: 48
End: 2023-04-03

## 2023-04-03 DIAGNOSIS — Z79.01 LONG TERM CURRENT USE OF ANTICOAGULANT THERAPY: ICD-10-CM

## 2023-04-03 DIAGNOSIS — I26.99 PULMONARY EMBOLISM, UNSPECIFIED CHRONICITY, UNSPECIFIED PULMONARY EMBOLISM TYPE, UNSPECIFIED WHETHER ACUTE COR PULMONALE PRESENT (H): ICD-10-CM

## 2023-04-03 DIAGNOSIS — I26.99 PULMONARY EMBOLISM WITHOUT ACUTE COR PULMONALE, UNSPECIFIED CHRONICITY, UNSPECIFIED PULMONARY EMBOLISM TYPE (H): ICD-10-CM

## 2023-04-03 DIAGNOSIS — I26.99 PULMONARY EMBOLISM (H): Primary | ICD-10-CM

## 2023-04-03 DIAGNOSIS — I26.99 PULMONARY EMBOLISM (H): ICD-10-CM

## 2023-04-03 LAB — INR BLD: 1.6 (ref 0.9–1.1)

## 2023-04-03 PROCEDURE — 36416 COLLJ CAPILLARY BLOOD SPEC: CPT

## 2023-04-03 PROCEDURE — 85610 PROTHROMBIN TIME: CPT

## 2023-04-03 RX ORDER — ENOXAPARIN SODIUM 100 MG/ML
1 INJECTION SUBCUTANEOUS EVERY 12 HOURS
Qty: 24 ML | Refills: 0 | Status: CANCELLED | OUTPATIENT
Start: 2023-04-03

## 2023-04-03 NOTE — PROGRESS NOTES
ANTICOAGULATION MANAGEMENT     Yusef Alvares 47 year old male is on warfarin with subtherapeutic INR result. (Goal INR 2.0-3.0)    Recent labs: (last 7 days)     04/03/23  1548   INR 1.6*       ASSESSMENT     Warfarin Lab Questionnaire        4/3/2023    10:52 AM   Dose in Tablet or Mg   TAB or MG? milligram (mg)          View : No data to display.                  4/3/2023    10:52 AM   Warfarin Lab Questionnaire   Missed doses? No   Medication changes? No   Abnormal bleeding? No   Shortness of breath? No   Injuries or illness since last INR? No   Changes in diet or alcohol? No   Upcoming surgery, procedure? No   Best number to call with results? [phone]       Additional findings: see additional [employer] messaging       PLAN     Unable to reach beronica today.    see [employer] messaging from Sonja ANGELES    Follow up required to discuss dosing instructions and confirm understanding of instructions    Kristina Vyas RN  Anticoagulation Clinic  4/5/2023

## 2023-04-03 NOTE — TELEPHONE ENCOUNTER
Reason for Call:  Other call back    Detailed comments: Patient return call. Please give patient a call back.    Phone Number Patient can be reached at: Cell number on file:    Telephone Information:   Mobile [phone]       Best Time: N/A    Can we leave a detailed message on this number? Not Applicable    Call taken on 4/3/2023 at 10:09 AM by Tish Tellez

## 2023-04-03 NOTE — TELEPHONE ENCOUNTER
General Call      Reason for Call:  Call back     What are your questions or concerns:  Patient looking to speak with inr nurse about scheduling appointment. Please advise scheduling was offered patient want to speak with nurse     Date of last appointment with provider: 3/31    Could we send this information to you in [email or web address] or would you prefer to receive a phone call?:  Call back - [phone]

## 2023-04-03 NOTE — PROGRESS NOTES
ANTICOAGULATION MANAGEMENT     Yusef Alvares 47 year old male is on warfarin with subtherapeutic INR result. (Goal INR 2.0-3.0)    Recent labs: (last 7 days)     04/03/23  1548   INR 1.6*       ASSESSMENT     Warfarin Lab Questionnaire        4/3/2023    10:52 AM   Dose in Tablet or Mg   TAB or MG? milligram (mg)          View : No data to display.                  4/3/2023    10:52 AM   Warfarin Lab Questionnaire   Missed doses? No   Medication changes? No   Abnormal bleeding? No   Shortness of breath? No   Injuries or illness since last INR? No   Changes in diet or alcohol? No   Upcoming surgery, procedure? No   Best number to call with results? [phone]       Additional findings: reviewed dosing with patient, warfarin taken as instructed. Patient ran out of Lovenox and is unable to afford to pick it up from a local Pharmacy, his usual Pharmacy will not have it available until Wednesday. Discussed with [facility], will boost and increase dose       PLAN     Recommended plan for no diet, medication or health factor changes affecting INR     Dosing Instructions: booster dose then Increase your warfarin dose (12% change) with next INR in 1 week       Summary  As of 4/3/2023    Full warfarin instructions:  4/3: 15 mg; Otherwise 5 mg every Sun, Thu; 7.5 mg all other days   Next INR check:               Telephone call with Kosta who verbalizes understanding and agrees to plan  Sent [email or web address] message with dosing and follow up instructions    Lab visit scheduled    Education provided:     Symptom monitoring: monitoring for clotting signs and symptoms and when to seek medical attention/emergency care    Contact [phone]  with any changes, questions or concerns.     Plan made with [facility] Pharmacist Amy Stone, RN  Anticoagulation Clinic  4/3/2023    _______________________________________________________________________     Anticoagulation Episode Summary     Current INR goal:  2.0-3.0   TTR:  83.2 %  (1 y)   Target end date:  Indefinite   Send INR reminders to:  [facility]    Indications    Pulmonary embolism (H) [I26.99]  Long term current use of anticoagulant therapy [Z79.01]  Pulmonary embolism without acute cor pulmonale  unspecified chronicity  unspecified pulmonary embolism type (H) [I26.99]  Pulmonary embolism  unspecified chronicity  unspecified pulmonary embolism type  unspecified whether acute cor pulmonale present (H) [I26.99]           Comments:  Hx PE. low protein C levels.         Anticoagulation Care Providers     Provider Role Specialty Phone number    Yusef Katz MD Referring Family Medicine [phone]

## 2023-04-10 ENCOUNTER — ANTICOAGULATION THERAPY VISIT (OUTPATIENT)
Dept: ANTICOAGULATION | Facility: CLINIC | Age: 48
End: 2023-04-10

## 2023-04-10 ENCOUNTER — LAB (OUTPATIENT)
Dept: LAB | Facility: CLINIC | Age: 48
End: 2023-04-10
Payer: COMMERCIAL

## 2023-04-10 DIAGNOSIS — I26.99 PULMONARY EMBOLISM, UNSPECIFIED CHRONICITY, UNSPECIFIED PULMONARY EMBOLISM TYPE, UNSPECIFIED WHETHER ACUTE COR PULMONALE PRESENT (H): ICD-10-CM

## 2023-04-10 DIAGNOSIS — I26.99 PULMONARY EMBOLISM WITHOUT ACUTE COR PULMONALE, UNSPECIFIED CHRONICITY, UNSPECIFIED PULMONARY EMBOLISM TYPE (H): ICD-10-CM

## 2023-04-10 DIAGNOSIS — I26.99 PULMONARY EMBOLISM (H): Primary | ICD-10-CM

## 2023-04-10 DIAGNOSIS — Z79.01 LONG TERM CURRENT USE OF ANTICOAGULANT THERAPY: ICD-10-CM

## 2023-04-10 DIAGNOSIS — I26.99 PULMONARY EMBOLISM (H): ICD-10-CM

## 2023-04-10 LAB — INR BLD: 1.9 (ref 0.9–1.1)

## 2023-04-10 PROCEDURE — 85610 PROTHROMBIN TIME: CPT

## 2023-04-10 PROCEDURE — 36416 COLLJ CAPILLARY BLOOD SPEC: CPT

## 2023-04-10 NOTE — PROGRESS NOTES
ANTICOAGULATION MANAGEMENT     Yusef Alvares 47 year old male is on warfarin with subtherapeutic INR result. (Goal INR 2.0-3.0)    Recent labs: (last 7 days)     04/10/23  1425   INR 1.9*       ASSESSMENT       Source(s): Chart Review and Patient/Caregiver Call       Warfarin doses taken: Warfarin taken as instructed    Diet: No new diet changes identified    New illness, injury, or hospitalization: No    Medication/supplement changes: None noted    Signs or symptoms of bleeding or clotting: No    Previous INR: Subtherapeutic    Additional findings: Has been sub therap since procedure, denies any changes with diet or medications, no otcs or change in social habits.         PLAN     Recommended plan for ongoing change(s) affecting INR     Dosing Instructions: booster dose then Increase your warfarin dose (10.5% change) with next INR in 1 week       Summary  As of 4/10/2023    Full warfarin instructions:  4/10: 15 mg; Otherwise 7.5 mg every day   Next INR check:               Telephone call with Kosta who verbalizes understanding and agrees to plan    Lab visit scheduled    Education provided:     Please call back if any changes to your diet, medications or how you've been taking warfarin    Goal range and lab monitoring: goal range and significance of current result, Importance of therapeutic range and Importance of following up at instructed interval    Plan made per [facility] anticoagulation protocol    Kristina Vyas RN  Anticoagulation Clinic  4/10/2023    _______________________________________________________________________     Anticoagulation Episode Summary     Current INR goal:  2.0-3.0   TTR:  81.3 % (1 y)   Target end date:  Indefinite   [facility]    Indications    Pulmonary embolism (H) [I26.99]  Long term current use of anticoagulant therapy [Z79.01]  Pulmonary embolism without acute cor pulmonale  unspecified chronicity  unspecified pulmonary embolism type (H) [I26.99]  Pulmonary  embolism  unspecified chronicity  unspecified pulmonary embolism type  unspecified whether acute cor pulmonale present (H) [I26.99]           Comments:  Hx PE. low protein C levels.         Anticoagulation Care Providers     Provider Role Specialty Phone number    Yusef Katz MD [facility] Medicine [phone]

## 2023-04-17 ENCOUNTER — ANTICOAGULATION THERAPY VISIT (OUTPATIENT)
Dept: ANTICOAGULATION | Facility: CLINIC | Age: 48
End: 2023-04-17

## 2023-04-17 ENCOUNTER — LAB (OUTPATIENT)
Dept: LAB | Facility: CLINIC | Age: 48
End: 2023-04-17
Payer: COMMERCIAL

## 2023-04-17 DIAGNOSIS — I26.99 PULMONARY EMBOLISM (H): Primary | ICD-10-CM

## 2023-04-17 DIAGNOSIS — Z79.01 LONG TERM CURRENT USE OF ANTICOAGULANT THERAPY: ICD-10-CM

## 2023-04-17 DIAGNOSIS — I26.99 PULMONARY EMBOLISM, UNSPECIFIED CHRONICITY, UNSPECIFIED PULMONARY EMBOLISM TYPE, UNSPECIFIED WHETHER ACUTE COR PULMONALE PRESENT (H): ICD-10-CM

## 2023-04-17 DIAGNOSIS — I26.99 PULMONARY EMBOLISM WITHOUT ACUTE COR PULMONALE, UNSPECIFIED CHRONICITY, UNSPECIFIED PULMONARY EMBOLISM TYPE (H): ICD-10-CM

## 2023-04-17 DIAGNOSIS — I26.99 PULMONARY EMBOLISM (H): ICD-10-CM

## 2023-04-17 LAB — INR BLD: 3.8 (ref 0.9–1.1)

## 2023-04-17 PROCEDURE — 36416 COLLJ CAPILLARY BLOOD SPEC: CPT

## 2023-04-17 PROCEDURE — 85610 PROTHROMBIN TIME: CPT

## 2023-04-17 NOTE — PROGRESS NOTES
ANTICOAGULATION MANAGEMENT     Yusef Alvares 47 year old male is on warfarin with supratherapeutic INR result. (Goal INR 2.0-3.0)    Recent labs: (last 7 days)     04/17/23  1020   INR 3.8*       ASSESSMENT       Source(s): Chart Review and Patient/Caregiver Call       Warfarin doses taken: Booster dose(s) recently taken which may be affecting INR    Diet: No new diet changes identified    Medication/supplement changes: None noted    New illness, injury, or hospitalization: No    Signs or symptoms of bleeding or clotting: No    Previous INR: Subtherapeutic    Additional findings: None     PLAN     Recommended plan for temporary change(s) affecting INR     Dosing Instructions: Continue your current warfarin dose with next INR in 1 week       Summary  As of 4/17/2023    Full warfarin instructions:  7.5 mg every day   Next INR check:  4/24/2023             Telephone call with Kosta who verbalizes understanding and agrees to plan    Lab visit scheduled    Education provided:     Please call back if any changes to your diet, medications or how you've been taking warfarin    Symptom monitoring: monitoring for bleeding signs and symptoms    Plan made per [facility] anticoagulation protocol    Thalia Sood RN  Anticoagulation Clinic  4/17/2023    _______________________________________________________________________     Anticoagulation Episode Summary     Current INR goal:  2.0-3.0   TTR:  80.4 % (1 y)   Target end date:  Indefinite   Send INR reminders to:  [facility]    Indications    Pulmonary embolism (H) [I26.99]  Long term current use of anticoagulant therapy [Z79.01]  Pulmonary embolism without acute cor pulmonale  unspecified chronicity  unspecified pulmonary embolism type (H) [I26.99]  Pulmonary embolism  unspecified chronicity  unspecified pulmonary embolism type  unspecified whether acute cor pulmonale present (H) [I26.99]           Comments:  Hx PE. low protein C levels.         Anticoagulation Care Providers      Provider Role Specialty Phone number    Yusef Katz MD Referring Family Medicine [phone]

## 2023-04-24 ENCOUNTER — ANTICOAGULATION THERAPY VISIT (OUTPATIENT)
Dept: ANTICOAGULATION | Facility: CLINIC | Age: 48
End: 2023-04-24

## 2023-04-24 ENCOUNTER — LAB (OUTPATIENT)
Dept: LAB | Facility: CLINIC | Age: 48
End: 2023-04-24
Payer: COMMERCIAL

## 2023-04-24 DIAGNOSIS — I26.99 PULMONARY EMBOLISM, UNSPECIFIED CHRONICITY, UNSPECIFIED PULMONARY EMBOLISM TYPE, UNSPECIFIED WHETHER ACUTE COR PULMONALE PRESENT (H): ICD-10-CM

## 2023-04-24 DIAGNOSIS — I26.99 PULMONARY EMBOLISM WITHOUT ACUTE COR PULMONALE, UNSPECIFIED CHRONICITY, UNSPECIFIED PULMONARY EMBOLISM TYPE (H): ICD-10-CM

## 2023-04-24 DIAGNOSIS — Z79.01 LONG TERM CURRENT USE OF ANTICOAGULANT THERAPY: ICD-10-CM

## 2023-04-24 DIAGNOSIS — I26.99 PULMONARY EMBOLISM (H): ICD-10-CM

## 2023-04-24 DIAGNOSIS — I26.99 PULMONARY EMBOLISM (H): Primary | ICD-10-CM

## 2023-04-24 LAB — INR BLD: 3.9 (ref 0.9–1.1)

## 2023-04-24 PROCEDURE — 85610 PROTHROMBIN TIME: CPT

## 2023-04-24 PROCEDURE — 36416 COLLJ CAPILLARY BLOOD SPEC: CPT

## 2023-04-24 NOTE — PROGRESS NOTES
ANTICOAGULATION MANAGEMENT     Yusef Alvares 47 year old male is on warfarin with supratherapeutic INR result. (Goal INR 2.0-3.0)    Recent labs: (last 7 days)     04/24/23  1149   INR 3.9*       ASSESSMENT     Warfarin Lab Questionnaire    Warfarin Doses Last 7 Days      4/23/2023    12:56 PM   Dose in Tablet or Mg   TAB or MG? milligram (mg)     Pt Rptd Dose SUNDAY MONDAY TUESDAY WED THURS FRIDAY SATURDAY 4/23/2023  12:56 PM 7.5 7.5 7.5 7.5 7.5 7.5 7.5         4/23/2023   Warfarin Lab Questionnaire   Missed doses within past 14 days? No   Changes in diet or alcohol within past 14 days? No   Medication changes since last result? No   Injuries or illness since last result? No   New shortness of breath, severe headaches or sudden changes in vision since last result? No   Abnormal bleeding since last result? No   Upcoming surgery, procedure? No   Best number to call with results? [phone]        Previous result: Supratherapeutic  Additional findings: None       PLAN     Recommended plan for no diet, medication or health factor changes affecting INR     Dosing Instructions: partial hold then decrease your warfarin dose (9.5% change) with next INR in 10 days       Summary  As of 4/24/2023    Full warfarin instructions:  4/24: 2.5 mg; Otherwise 5 mg every Mon, Fri; 7.5 mg all other days   Next INR check:  5/4/2023             Telephone call with Kosta who verbalizes understanding and agrees to plan    Lab visit scheduled    Education provided:     Please call back if any changes to your diet, medications or how you've been taking warfarin    Contact [phone]  with any changes, questions or concerns.     Plan made per [facility] anticoagulation protocol    Suzie Fihs, RN  Anticoagulation Clinic  4/24/2023    _______________________________________________________________________     Anticoagulation Episode Summary     Current INR goal:  2.0-3.0   TTR:  78.5 % (1 y)   Target end date:  Indefinite   [facility]    Indications    Pulmonary embolism (H) [I26.99]  Long term current use of anticoagulant therapy [Z79.01]  Pulmonary embolism without acute cor pulmonale  unspecified chronicity  unspecified pulmonary embolism type (H) [I26.99]  Pulmonary embolism  unspecified chronicity  unspecified pulmonary embolism type  unspecified whether acute cor pulmonale present (H) [I26.99]           Comments:  Hx PE. low protein C levels.         Anticoagulation Care Providers     Provider Role Specialty Phone number    Yusef Katz MD [facility] Family Medicine [phone]

## 2023-04-25 ENCOUNTER — TELEPHONE (OUTPATIENT)
Dept: FAMILY MEDICINE | Facility: CLINIC | Age: 48
End: 2023-04-25
Payer: COMMERCIAL

## 2023-04-25 NOTE — TELEPHONE ENCOUNTER
Patient Returning Call    Reason for call:  Medication question    Information relayed to patient:  n/a    Patient has additional questions:  Yes    What are your questions/concerns:  Patient is calling to ask an question regarding his medication    Who does the patient want to speak with:  RN    Is an  needed?:  No      Could we send this information to you in [facility] or would you prefer to receive a phone call?:   Patient would prefer a phone call   Okay to leave a detailed message?: Yes at Cell number on file:    Telephone Information:   Mobile [phone]

## 2023-04-25 NOTE — TELEPHONE ENCOUNTER
Returned call to patient. He wanted to clarify his warfarin dosing, confirmed new dosing of 5 mg every Monday, Friday; 7.5 mg all other days of the week. No additional questions at this time.    Sonja Stone RN, BSN  [facility] Anticoagulation Clinic  [phone]

## 2023-05-03 DIAGNOSIS — Z79.01 LONG TERM CURRENT USE OF ANTICOAGULANT THERAPY: ICD-10-CM

## 2023-05-03 DIAGNOSIS — I26.99 ACUTE PULMONARY EMBOLISM WITHOUT ACUTE COR PULMONALE, UNSPECIFIED PULMONARY EMBOLISM TYPE (H): ICD-10-CM

## 2023-05-03 DIAGNOSIS — I26.99 PULMONARY EMBOLISM WITHOUT ACUTE COR PULMONALE, UNSPECIFIED CHRONICITY, UNSPECIFIED PULMONARY EMBOLISM TYPE (H): ICD-10-CM

## 2023-05-03 DIAGNOSIS — I26.99 PULMONARY EMBOLISM (H): ICD-10-CM

## 2023-05-04 RX ORDER — WARFARIN SODIUM 5 MG/1
TABLET ORAL
Qty: 122 TABLET | Refills: 1 | Status: SHIPPED | OUTPATIENT
Start: 2023-05-04 | End: 2023-10-20

## 2023-05-04 NOTE — TELEPHONE ENCOUNTER
ANTICOAGULATION MANAGEMENT:  Medication Refill    Anticoagulation Summary  As of 4/24/2023    Warfarin maintenance plan:  5 mg (5 mg x 1) every Mon, Fri; 7.5 mg (5 mg x 1.5) all other days   Next INR check:  5/4/2023   Target end date:  Indefinite    Indications    Pulmonary embolism (H) [I26.99]  Long term current use of anticoagulant therapy [Z79.01]  Pulmonary embolism without acute cor pulmonale  unspecified chronicity  unspecified pulmonary embolism type (H) [I26.99]  Pulmonary embolism  unspecified chronicity  unspecified pulmonary embolism type  unspecified whether acute cor pulmonale present (H) [I26.99]             Anticoagulation Care Providers     Provider Role Specialty Phone number    Yusef Katz MD Referring Family Medicine [phone]          Visit with referring provider/group within last year: Yes    [facility] referral signed within last year: Yes    Yusef meets all criteria for refill (current [facility] referral, office visit with referring provider/group in last year, lab monitoring up to date or not exceeding 2 weeks overdue). Rx instructions and quantity supplied updated to match patient's current dosing plan. Warfarin 90 day supply with 1 refill granted per [facility] protocol     Suzie Fish RN  Anticoagulation Clinic

## 2023-05-05 ENCOUNTER — ANTICOAGULATION THERAPY VISIT (OUTPATIENT)
Dept: ANTICOAGULATION | Facility: CLINIC | Age: 48
End: 2023-05-05

## 2023-05-05 ENCOUNTER — LAB (OUTPATIENT)
Dept: LAB | Facility: CLINIC | Age: 48
End: 2023-05-05
Payer: COMMERCIAL

## 2023-05-05 DIAGNOSIS — I26.99 PULMONARY EMBOLISM (H): ICD-10-CM

## 2023-05-05 DIAGNOSIS — Z79.01 LONG TERM CURRENT USE OF ANTICOAGULANT THERAPY: ICD-10-CM

## 2023-05-05 DIAGNOSIS — I26.99 PULMONARY EMBOLISM WITHOUT ACUTE COR PULMONALE, UNSPECIFIED CHRONICITY, UNSPECIFIED PULMONARY EMBOLISM TYPE (H): ICD-10-CM

## 2023-05-05 DIAGNOSIS — I26.99 PULMONARY EMBOLISM, UNSPECIFIED CHRONICITY, UNSPECIFIED PULMONARY EMBOLISM TYPE, UNSPECIFIED WHETHER ACUTE COR PULMONALE PRESENT (H): ICD-10-CM

## 2023-05-05 DIAGNOSIS — I26.99 PULMONARY EMBOLISM (H): Primary | ICD-10-CM

## 2023-05-05 LAB — INR BLD: 3 (ref 0.9–1.1)

## 2023-05-05 PROCEDURE — 85610 PROTHROMBIN TIME: CPT

## 2023-05-05 PROCEDURE — 36416 COLLJ CAPILLARY BLOOD SPEC: CPT

## 2023-05-05 NOTE — PROGRESS NOTES
ANTICOAGULATION MANAGEMENT     Yusef Alvares 47 year old male is on warfarin with therapeutic INR result. (Goal INR 2.0-3.0)    Recent labs: (last 7 days)     05/05/23  0906   INR 3.0*       ASSESSMENT       Source(s): Chart Review and Patient/Caregiver Call       Warfarin doses taken: Warfarin taken as instructed    Diet: No new diet changes identified    Medication/supplement changes: None noted    New illness, injury, or hospitalization: No    Signs or symptoms of bleeding or clotting: No    Previous result: Supratherapeutic resulting in a dose decrease    Additional findings: None         PLAN     Recommended plan for no diet, medication or health factor changes affecting INR     Dosing Instructions: Continue your current warfarin dose with next INR in 3 weeks       Summary  As of 5/5/2023    Full warfarin instructions:  5 mg every Mon, Fri; 7.5 mg all other days   Next INR check:  5/26/2023             Telephone call with Kosta who verbalizes understanding and agrees to plan    Lab visit scheduled    Education provided:     Please call back if any changes to your diet, medications or how you've been taking warfarin    Contact [phone]  with any changes, questions or concerns.     Plan made per [facility] anticoagulation protocol    Suzie Fish RN  Anticoagulation Clinic  5/5/2023    _______________________________________________________________________     Anticoagulation Episode Summary     Current INR goal:  2.0-3.0   TTR:  75.5 % (1 y)   Target end date:  Indefinite   Send INR reminders to:  [facility]    Indications    Pulmonary embolism (H) [I26.99]  Long term current use of anticoagulant therapy [Z79.01]  Pulmonary embolism without acute cor pulmonale  unspecified chronicity  unspecified pulmonary embolism type (H) [I26.99]  Pulmonary embolism  unspecified chronicity  unspecified pulmonary embolism type  unspecified whether acute cor pulmonale present (H) [I26.99]           Comments:  Hx PE. low  Patient protein C levels.         Anticoagulation Care Providers     Provider Role Specialty Phone number    Yusef Ktaz MD Referring Family Medicine [phone]

## 2023-05-26 ENCOUNTER — ANTICOAGULATION THERAPY VISIT (OUTPATIENT)
Dept: ANTICOAGULATION | Facility: CLINIC | Age: 48
End: 2023-05-26

## 2023-05-26 ENCOUNTER — LAB (OUTPATIENT)
Dept: LAB | Facility: CLINIC | Age: 48
End: 2023-05-26
Payer: COMMERCIAL

## 2023-05-26 DIAGNOSIS — I26.99 PULMONARY EMBOLISM (H): Primary | ICD-10-CM

## 2023-05-26 DIAGNOSIS — I26.99 PULMONARY EMBOLISM (H): ICD-10-CM

## 2023-05-26 DIAGNOSIS — I26.99 PULMONARY EMBOLISM, UNSPECIFIED CHRONICITY, UNSPECIFIED PULMONARY EMBOLISM TYPE, UNSPECIFIED WHETHER ACUTE COR PULMONALE PRESENT (H): ICD-10-CM

## 2023-05-26 DIAGNOSIS — I26.99 PULMONARY EMBOLISM WITHOUT ACUTE COR PULMONALE, UNSPECIFIED CHRONICITY, UNSPECIFIED PULMONARY EMBOLISM TYPE (H): ICD-10-CM

## 2023-05-26 DIAGNOSIS — Z79.01 LONG TERM CURRENT USE OF ANTICOAGULANT THERAPY: ICD-10-CM

## 2023-05-26 LAB — INR BLD: 2.4 (ref 0.9–1.1)

## 2023-05-26 PROCEDURE — 36416 COLLJ CAPILLARY BLOOD SPEC: CPT

## 2023-05-26 PROCEDURE — 85610 PROTHROMBIN TIME: CPT

## 2023-05-26 NOTE — PROGRESS NOTES
ANTICOAGULATION MANAGEMENT     Yusef Alvares 47 year old male is on warfarin with therapeutic INR result. (Goal INR 2.0-3.0)    Recent labs: (last 7 days)     05/26/23  1446   INR 2.4*       ASSESSMENT     Warfarin Lab Questionnaire    Warfarin Doses Last 7 Days      5/25/2023    10:58 AM   Dose in Tablet or Mg   TAB or MG? milligram (mg)     Pt Rptd Dose SUNDAY MONDAY TUESDAY WED THURS 5/25/2023  10:58 AM 7.5 5 7.5 7.5 7.5         5/25/2023   Warfarin Lab Questionnaire   Missed doses within past 14 days? No   Changes in diet or alcohol within past 14 days? No   Medication changes since last result? No   Injuries or illness since last result? No   New shortness of breath, severe headaches or sudden changes in vision since last result? No   Abnormal bleeding since last result? No   Upcoming surgery, procedure? No   Best number to call with results? [phone]        Previous result: Therapeutic last visit; previously outside of goal range  Additional findings: None       PLAN     Recommended plan for no diet, medication or health factor changes affecting INR     Dosing Instructions: Continue your current warfarin dose with next INR in 4 weeks       Summary  As of 5/26/2023    Full warfarin instructions:  5 mg every Mon, Fri; 7.5 mg all other days   Next INR check:  6/30/2023             Telephone call with Kosta who verbalizes understanding and agrees to plan    Lab visit scheduled    Education provided:     Please call back if any changes to your diet, medications or how you've been taking warfarin    Contact [phone]  with any changes, questions or concerns.     Plan made per [facility] anticoagulation protocol    Suzie Fish RN  Anticoagulation Clinic  5/26/2023    _______________________________________________________________________     Anticoagulation Episode Summary     Current INR goal:  2.0-3.0   TTR:  75.5 % (1 y)   Target end date:  Indefinite   Send INR reminders to:  [facility]    Indications     Pulmonary embolism (H) [I26.99]  Long term current use of anticoagulant therapy [Z79.01]  Pulmonary embolism without acute cor pulmonale  unspecified chronicity  unspecified pulmonary embolism type (H) [I26.99]  Pulmonary embolism  unspecified chronicity  unspecified pulmonary embolism type  unspecified whether acute cor pulmonale present (H) [I26.99]           Comments:  Hx PE. low protein C levels.         Anticoagulation Care Providers     Provider Role Specialty Phone number    Yusef Katz MD Referring Family Medicine [phone]

## 2023-06-05 ENCOUNTER — TRANSFERRED RECORDS (OUTPATIENT)
Dept: HEALTH INFORMATION MANAGEMENT | Facility: CLINIC | Age: 48
End: 2023-06-05
Payer: COMMERCIAL

## 2023-06-05 ENCOUNTER — MEDICAL CORRESPONDENCE (OUTPATIENT)
Dept: HEALTH INFORMATION MANAGEMENT | Facility: CLINIC | Age: 48
End: 2023-06-05

## 2023-06-30 ENCOUNTER — LAB (OUTPATIENT)
Dept: LAB | Facility: CLINIC | Age: 48
End: 2023-06-30
Payer: COMMERCIAL

## 2023-06-30 ENCOUNTER — ANTICOAGULATION THERAPY VISIT (OUTPATIENT)
Dept: ANTICOAGULATION | Facility: CLINIC | Age: 48
End: 2023-06-30

## 2023-06-30 DIAGNOSIS — Z79.01 LONG TERM CURRENT USE OF ANTICOAGULANT THERAPY: ICD-10-CM

## 2023-06-30 DIAGNOSIS — I26.99 PULMONARY EMBOLISM (H): ICD-10-CM

## 2023-06-30 DIAGNOSIS — I26.99 PULMONARY EMBOLISM WITHOUT ACUTE COR PULMONALE, UNSPECIFIED CHRONICITY, UNSPECIFIED PULMONARY EMBOLISM TYPE (H): ICD-10-CM

## 2023-06-30 DIAGNOSIS — I26.99 PULMONARY EMBOLISM (H): Primary | ICD-10-CM

## 2023-06-30 DIAGNOSIS — I26.99 PULMONARY EMBOLISM, UNSPECIFIED CHRONICITY, UNSPECIFIED PULMONARY EMBOLISM TYPE, UNSPECIFIED WHETHER ACUTE COR PULMONALE PRESENT (H): ICD-10-CM

## 2023-06-30 LAB — INR BLD: 2.1 (ref 0.9–1.1)

## 2023-06-30 PROCEDURE — 85610 PROTHROMBIN TIME: CPT

## 2023-06-30 PROCEDURE — 36416 COLLJ CAPILLARY BLOOD SPEC: CPT

## 2023-06-30 NOTE — PROGRESS NOTES
ANTICOAGULATION MANAGEMENT     Yusef Alvares 47 year old male is on warfarin with therapeutic INR result. (Goal INR 2.0-3.0)    Recent labs: (last 7 days)     06/30/23  0855   INR 2.1*       ASSESSMENT       Source(s): Chart Review and Patient/Caregiver Call       Warfarin doses taken: Warfarin taken as instructed    Diet: No new diet changes identified    Medication/supplement changes: None noted    New illness, injury, or hospitalization: No    Signs or symptoms of bleeding or clotting: No    Previous result: Therapeutic last 2(+) visits    Additional findings: None         PLAN     Recommended plan for no diet, medication or health factor changes affecting INR     Dosing Instructions: Continue your current warfarin dose with next INR in 5 weeks       Summary  As of 6/30/2023    Full warfarin instructions:  5 mg every Mon, Fri; 7.5 mg all other days   Next INR check:  8/4/2023             Telephone call with Kosta who verbalizes understanding and agrees to plan    Lab visit scheduled    Education provided:     Please call back if any changes to your diet, medications or how you've been taking warfarin    Goal range and lab monitoring: goal range and significance of current result, Importance of therapeutic range and Importance of following up at instructed interval    Plan made per [facility] anticoagulation protocol    Kristina Vyas RN  Anticoagulation Clinic  6/30/2023    _______________________________________________________________________     Anticoagulation Episode Summary     Current INR goal:  2.0-3.0   TTR:  75.5 % (1 y)   Target end date:  Indefinite   Send INR reminders to:  [facility]    Indications    Pulmonary embolism (H) [I26.99]  Long term current use of anticoagulant therapy [Z79.01]  Pulmonary embolism without acute cor pulmonale  unspecified chronicity  unspecified pulmonary embolism type (H) [I26.99]  Pulmonary embolism  unspecified chronicity  unspecified pulmonary embolism type  unspecified  whether acute cor pulmonale present (H) [I26.99]           Comments:  Hx PE. low protein C levels.         Anticoagulation Care Providers     Provider Role Specialty Phone number    Yusef Katz MD [facility] Medicine [phone]

## 2023-07-03 DIAGNOSIS — E03.9 HYPOTHYROIDISM, UNSPECIFIED TYPE: ICD-10-CM

## 2023-07-03 DIAGNOSIS — E78.5 HYPERLIPIDEMIA LDL GOAL <100: ICD-10-CM

## 2023-07-03 RX ORDER — SIMVASTATIN 40 MG
TABLET ORAL
Qty: 90 TABLET | Refills: 0 | Status: SHIPPED | OUTPATIENT
Start: 2023-07-03 | End: 2023-07-31

## 2023-07-03 RX ORDER — SIMVASTATIN 20 MG
TABLET ORAL
Qty: 90 TABLET | Refills: 0 | Status: SHIPPED | OUTPATIENT
Start: 2023-07-03 | End: 2023-07-31

## 2023-07-03 RX ORDER — LEVOTHYROXINE SODIUM 50 UG/1
TABLET ORAL
Qty: 90 TABLET | Refills: 0 | Status: SHIPPED | OUTPATIENT
Start: 2023-07-03 | End: 2023-07-31

## 2023-07-03 NOTE — TELEPHONE ENCOUNTER
"Medication is being filled for 1 time refill only due to:  Patient needs to be seen because it has been more than one year since last visit. My chart message sent.     Pending Prescriptions:                       Disp   Refills    simvastatin (ZOCOR) 40 MG tablet [Pharmac*30 tab*11           Sig: TAKE 1 TABLET BY MOUTH DAILY ALONG WITH 20MG TO           EQUAL 60MG    simvastatin (ZOCOR) 20 MG tablet [Pharmac*30 tab*11           Sig: TAKE 1 TABLET BY MOUTH AT BEDTIME ALONG WITH 40MG           TO EQUAL 60MG    levothyroxine (SYNTHROID/LEVOTHROID) 50 M*30 tab*11           Sig: TAKE 1 TABLET BY MOUTH DAILY      Requested Prescriptions   Pending Prescriptions Disp Refills     simvastatin (ZOCOR) 40 MG tablet [Pharmacy Med Name: Simvastatin 40MG TABS] 30 tablet 11     Sig: TAKE 1 TABLET BY MOUTH DAILY ALONG WITH 20MG TO EQUAL 60MG       Statins Protocol Passed - 7/3/2023  9:12 AM        Passed - LDL on file in past 12 months     Recent Labs   Lab Test 09/08/22  0925   *             Passed - No abnormal creatine kinase in past 12 months     No lab results found.             Passed - Recent (12 mo) or future (30 days) visit within the authorizing provider's specialty     Patient has had an office visit with the authorizing provider or a provider within the authorizing providers department within the previous 12 mos or has a future within next 30 days. See \"Patient Info\" tab in inbasket, or \"Choose Columns\" in Meds & Orders section of the refill encounter.              Passed - Medication is active on med list        Passed - Patient is age 18 or older           simvastatin (ZOCOR) 20 MG tablet [Pharmacy Med Name: Simvastatin 20MG TABS] 30 tablet 11     Sig: TAKE 1 TABLET BY MOUTH AT BEDTIME ALONG WITH 40MG TO EQUAL 60MG       Statins Protocol Passed - 7/3/2023  9:12 AM        Passed - LDL on file in past 12 months     Recent Labs   Lab Test 09/08/22  0925   *             Passed - No abnormal creatine kinase in " "past 12 months     No lab results found.             Passed - Recent (12 mo) or future (30 days) visit within the authorizing provider's specialty     Patient has had an office visit with the authorizing provider or a provider within the authorizing providers department within the previous 12 mos or has a future within next 30 days. See \"Patient Info\" tab in inbasket, or \"Choose Columns\" in Meds & Orders section of the refill encounter.              Passed - Medication is active on med list        Passed - Patient is age 18 or older           levothyroxine (SYNTHROID/LEVOTHROID) 50 MCG tablet [Pharmacy Med Name: Levothyroxine Sodium 50MCG TABS] 30 tablet 11     Sig: TAKE 1 TABLET BY MOUTH DAILY       Thyroid Protocol Passed - 7/3/2023  9:12 AM        Passed - Patient is 12 years or older        Passed - Recent (12 mo) or future (30 days) visit within the authorizing provider's specialty     Patient has had an office visit with the authorizing provider or a provider within the authorizing providers department within the previous 12 mos or has a future within next 30 days. See \"Patient Info\" tab in inbasket, or \"Choose Columns\" in Meds & Orders section of the refill encounter.              Passed - Medication is active on med list        Passed - Normal TSH on file in past 12 months     Recent Labs   Lab Test 09/08/22  0925   TSH 2.01                 Stephenie Ortega RN on 7/3/2023 at 2:53 PM    "

## 2023-07-24 ASSESSMENT — ANXIETY QUESTIONNAIRES
IF YOU CHECKED OFF ANY PROBLEMS ON THIS QUESTIONNAIRE, HOW DIFFICULT HAVE THESE PROBLEMS MADE IT FOR YOU TO DO YOUR WORK, TAKE CARE OF THINGS AT HOME, OR GET ALONG WITH OTHER PEOPLE: NOT DIFFICULT AT ALL
6. BECOMING EASILY ANNOYED OR IRRITABLE: SEVERAL DAYS
5. BEING SO RESTLESS THAT IT IS HARD TO SIT STILL: NOT AT ALL
8. IF YOU CHECKED OFF ANY PROBLEMS, HOW DIFFICULT HAVE THESE MADE IT FOR YOU TO DO YOUR WORK, TAKE CARE OF THINGS AT HOME, OR GET ALONG WITH OTHER PEOPLE?: NOT DIFFICULT AT ALL
GAD7 TOTAL SCORE: 6
7. FEELING AFRAID AS IF SOMETHING AWFUL MIGHT HAPPEN: SEVERAL DAYS
1. FEELING NERVOUS, ANXIOUS, OR ON EDGE: SEVERAL DAYS
7. FEELING AFRAID AS IF SOMETHING AWFUL MIGHT HAPPEN: SEVERAL DAYS
4. TROUBLE RELAXING: SEVERAL DAYS
2. NOT BEING ABLE TO STOP OR CONTROL WORRYING: SEVERAL DAYS
GAD7 TOTAL SCORE: 6
3. WORRYING TOO MUCH ABOUT DIFFERENT THINGS: SEVERAL DAYS
GAD7 TOTAL SCORE: 6

## 2023-07-24 ASSESSMENT — ENCOUNTER SYMPTOMS
FREQUENCY: 0
FEVER: 0
ABDOMINAL PAIN: 0
DIARRHEA: 0
HEARTBURN: 0
DYSURIA: 0
NERVOUS/ANXIOUS: 1
DIZZINESS: 0
EYE PAIN: 0
JOINT SWELLING: 0
HEMATOCHEZIA: 0
SHORTNESS OF BREATH: 0
NAUSEA: 0
CONSTIPATION: 0
CHILLS: 0
HEMATURIA: 0
HEADACHES: 0
SORE THROAT: 0
MYALGIAS: 0
WEAKNESS: 0
COUGH: 0
PARESTHESIAS: 0
ARTHRALGIAS: 0
PALPITATIONS: 0

## 2023-07-24 ASSESSMENT — PATIENT HEALTH QUESTIONNAIRE - PHQ9
SUM OF ALL RESPONSES TO PHQ QUESTIONS 1-9: 0
SUM OF ALL RESPONSES TO PHQ QUESTIONS 1-9: 0
10. IF YOU CHECKED OFF ANY PROBLEMS, HOW DIFFICULT HAVE THESE PROBLEMS MADE IT FOR YOU TO DO YOUR WORK, TAKE CARE OF THINGS AT HOME, OR GET ALONG WITH OTHER PEOPLE: NOT DIFFICULT AT ALL

## 2023-07-24 ASSESSMENT — ACTIVITIES OF DAILY LIVING (ADL): CURRENT_FUNCTION: NO ASSISTANCE NEEDED

## 2023-07-31 ENCOUNTER — OFFICE VISIT (OUTPATIENT)
Dept: FAMILY MEDICINE | Facility: CLINIC | Age: 48
End: 2023-07-31
Payer: COMMERCIAL

## 2023-07-31 VITALS
SYSTOLIC BLOOD PRESSURE: 118 MMHG | HEART RATE: 81 BPM | BODY MASS INDEX: 32.71 KG/M2 | TEMPERATURE: 98.4 F | HEIGHT: 64 IN | DIASTOLIC BLOOD PRESSURE: 78 MMHG | OXYGEN SATURATION: 97 % | WEIGHT: 191.6 LBS | RESPIRATION RATE: 12 BRPM

## 2023-07-31 DIAGNOSIS — E66.811 CLASS 1 OBESITY DUE TO EXCESS CALORIES WITH SERIOUS COMORBIDITY AND BODY MASS INDEX (BMI) OF 32.0 TO 32.9 IN ADULT: ICD-10-CM

## 2023-07-31 DIAGNOSIS — G47.33 OSA (OBSTRUCTIVE SLEEP APNEA): ICD-10-CM

## 2023-07-31 DIAGNOSIS — E66.09 CLASS 1 OBESITY DUE TO EXCESS CALORIES WITH SERIOUS COMORBIDITY AND BODY MASS INDEX (BMI) OF 32.0 TO 32.9 IN ADULT: ICD-10-CM

## 2023-07-31 DIAGNOSIS — Z00.00 ENCOUNTER FOR MEDICARE ANNUAL WELLNESS EXAM: Primary | ICD-10-CM

## 2023-07-31 DIAGNOSIS — E03.9 HYPOTHYROIDISM, UNSPECIFIED TYPE: ICD-10-CM

## 2023-07-31 DIAGNOSIS — Z13.220 LIPID SCREENING: ICD-10-CM

## 2023-07-31 DIAGNOSIS — E78.5 HYPERLIPIDEMIA LDL GOAL <100: ICD-10-CM

## 2023-07-31 DIAGNOSIS — Z23 ENCOUNTER FOR IMMUNIZATION: ICD-10-CM

## 2023-07-31 DIAGNOSIS — M79.662 PAIN OF LEFT LOWER LEG: ICD-10-CM

## 2023-07-31 LAB
ANION GAP SERPL CALCULATED.3IONS-SCNC: 7 MMOL/L (ref 7–15)
BUN SERPL-MCNC: 14.9 MG/DL (ref 6–20)
CALCIUM SERPL-MCNC: 9.2 MG/DL (ref 8.6–10)
CHLORIDE SERPL-SCNC: 102 MMOL/L (ref 98–107)
CHOLEST SERPL-MCNC: 184 MG/DL
CREAT SERPL-MCNC: 0.89 MG/DL (ref 0.67–1.17)
DEPRECATED HCO3 PLAS-SCNC: 31 MMOL/L (ref 22–29)
GFR SERPL CREATININE-BSD FRML MDRD: >90 ML/MIN/1.73M2
GLUCOSE SERPL-MCNC: 100 MG/DL (ref 70–99)
HDLC SERPL-MCNC: 46 MG/DL
LDLC SERPL CALC-MCNC: 105 MG/DL
NONHDLC SERPL-MCNC: 138 MG/DL
POTASSIUM SERPL-SCNC: 4.3 MMOL/L (ref 3.4–5.3)
SODIUM SERPL-SCNC: 140 MMOL/L (ref 136–145)
TRIGL SERPL-MCNC: 167 MG/DL
TSH SERPL DL<=0.005 MIU/L-ACNC: 1.04 UIU/ML (ref 0.3–4.2)

## 2023-07-31 PROCEDURE — 80048 BASIC METABOLIC PNL TOTAL CA: CPT | Performed by: STUDENT IN AN ORGANIZED HEALTH CARE EDUCATION/TRAINING PROGRAM

## 2023-07-31 PROCEDURE — 84443 ASSAY THYROID STIM HORMONE: CPT | Performed by: STUDENT IN AN ORGANIZED HEALTH CARE EDUCATION/TRAINING PROGRAM

## 2023-07-31 PROCEDURE — 99213 OFFICE O/P EST LOW 20 MIN: CPT | Mod: 25 | Performed by: STUDENT IN AN ORGANIZED HEALTH CARE EDUCATION/TRAINING PROGRAM

## 2023-07-31 PROCEDURE — 91313 COVID-19 BIVALENT 18+ (MODERNA): CPT | Performed by: STUDENT IN AN ORGANIZED HEALTH CARE EDUCATION/TRAINING PROGRAM

## 2023-07-31 PROCEDURE — 80061 LIPID PANEL: CPT | Performed by: STUDENT IN AN ORGANIZED HEALTH CARE EDUCATION/TRAINING PROGRAM

## 2023-07-31 PROCEDURE — 36415 COLL VENOUS BLD VENIPUNCTURE: CPT | Performed by: STUDENT IN AN ORGANIZED HEALTH CARE EDUCATION/TRAINING PROGRAM

## 2023-07-31 PROCEDURE — 0134A COVID-19 BIVALENT 18+ (MODERNA): CPT | Performed by: STUDENT IN AN ORGANIZED HEALTH CARE EDUCATION/TRAINING PROGRAM

## 2023-07-31 PROCEDURE — 99396 PREV VISIT EST AGE 40-64: CPT | Mod: 25 | Performed by: STUDENT IN AN ORGANIZED HEALTH CARE EDUCATION/TRAINING PROGRAM

## 2023-07-31 RX ORDER — SIMVASTATIN 20 MG
TABLET ORAL
Qty: 90 TABLET | Refills: 3 | Status: SHIPPED | OUTPATIENT
Start: 2023-07-31 | End: 2024-07-31

## 2023-07-31 RX ORDER — SIMVASTATIN 40 MG
TABLET ORAL
Qty: 90 TABLET | Refills: 3 | Status: SHIPPED | OUTPATIENT
Start: 2023-07-31 | End: 2023-08-01

## 2023-07-31 RX ORDER — LEVOTHYROXINE SODIUM 50 UG/1
50 TABLET ORAL DAILY
Qty: 90 TABLET | Refills: 3 | Status: SHIPPED | OUTPATIENT
Start: 2023-07-31 | End: 2024-07-31

## 2023-07-31 ASSESSMENT — ENCOUNTER SYMPTOMS
CONSTIPATION: 0
DIZZINESS: 0
SHORTNESS OF BREATH: 0
PALPITATIONS: 0
JOINT SWELLING: 0
FREQUENCY: 0
WEAKNESS: 0
HEARTBURN: 0
COUGH: 0
FEVER: 0
ARTHRALGIAS: 0
DYSURIA: 0
HEADACHES: 0
SORE THROAT: 0
NAUSEA: 0
EYE PAIN: 0
NERVOUS/ANXIOUS: 1
DIARRHEA: 0
PARESTHESIAS: 0
MYALGIAS: 0
HEMATURIA: 0
CHILLS: 0
ABDOMINAL PAIN: 0
HEMATOCHEZIA: 0

## 2023-07-31 ASSESSMENT — PAIN SCALES - GENERAL: PAINLEVEL: NO PAIN (0)

## 2023-07-31 ASSESSMENT — ACTIVITIES OF DAILY LIVING (ADL): CURRENT_FUNCTION: NO ASSISTANCE NEEDED

## 2023-07-31 NOTE — PATIENT INSTRUCTIONS

## 2023-07-31 NOTE — PROGRESS NOTES
"SUBJECTIVE:   CC: Kosta is an 47 year old who presents for preventative health visit.       7/31/2023     9:15 AM   Additional Questions   Roomed by Adam SMITH CMA   Accompanied by self     Chief Complaint   Patient presents with    Wellness Visit    Thyroid Problem     Renew meds    Lipids     Renew meds    Physical    Leg Pain     Left leg pain, worse in A.M.     Orders     Needs order for new cpap machine     Imm/Inj     Moderna Booster         Healthy Habits:     In general, how would you rate your overall health?  Excellent    Frequency of exercise:  None    Do you usually eat at least 4 servings of fruit and vegetables a day, include whole grains    & fiber and avoid regularly eating high fat or \"junk\" foods?  No    Taking medications regularly:  Yes    Medication side effects:  None    Ability to successfully perform activities of daily living:  No assistance needed    Home Safety:  No safety concerns identified    Hearing Impairment:  No hearing concerns    In the past 6 months, have you been bothered by leaking of urine?  No    In general, how would you rate your overall mental or emotional health?  Excellent    Additional concerns today:  No  Answers submitted by the patient for this visit:  Patient Health Questionnaire (Submitted on 7/24/2023)  If you checked off any problems, how difficult have these problems made it for you to do your work, take care of things at home, or get along with other people?: Not difficult at all  PHQ9 TOTAL SCORE: 0  RACHEL-7 (Submitted on 7/24/2023)  RACHEL 7 TOTAL SCORE: 6      Today's PHQ-9 Score:       7/24/2023     9:11 AM   PHQ-9 SCORE   PHQ-9 Total Score MyChart 0   PHQ-9 Total Score 0       Leg Pain  Onset: x 2 months   Description:   Location: Left leg  Character: Sharp  Intensity: moderate  Progression of Symptoms: happens in A.M , better when he walks around   Accompanying Signs & Symptoms:  Other symptoms: tingling  History:   Previous similar pain: YES- hips   "   Precipitating factors:   Trauma or overuse: no   Alleviating factors:  Improved by: exercises, walking   Therapies Tried and outcome: walking around helps       Diet:   Sandwiches during the day   Has meals prescribed to him by his insurance?   Admits he has to cut down on the sweets     Exercise:   Admits that he doesn't exercise very much   Once in a while will go out for walks   Admits he procrastinates with exercising     Hyperlipidemia Follow-Up    Are you regularly taking any medication or supplement to lower your cholesterol?   Yes- atorvastatin  Are you having muscle aches or other side effects that you think could be caused by your cholesterol lowering medication?  No    Hypothyroidism Follow-up    Since last visit, patient describes the following symptoms: tremors in lips       Social History     Tobacco Use    Smoking status: Never    Smokeless tobacco: Never   Substance Use Topics    Alcohol use: No             7/24/2023     9:14 AM   Alcohol Use   Prescreen: >3 drinks/day or >7 drinks/week? Not Applicable       Last PSA: No results found for: PSA    Reviewed orders with patient. Reviewed health maintenance and updated orders accordingly - Yes    Reviewed and updated as needed this visit by clinical staff   Tobacco  Allergies  Meds              Reviewed and updated as needed this visit by Provider                     Review of Systems   Constitutional:  Negative for chills and fever.   HENT:  Negative for congestion, ear pain, hearing loss and sore throat.    Eyes:  Negative for pain and visual disturbance.   Respiratory:  Negative for cough and shortness of breath.    Cardiovascular:  Negative for chest pain, palpitations and peripheral edema.   Gastrointestinal:  Negative for abdominal pain, constipation, diarrhea, heartburn, hematochezia and nausea.   Genitourinary:  Negative for dysuria, frequency, genital sores, hematuria, impotence, penile discharge and urgency.   Musculoskeletal:  Negative  for arthralgias, joint swelling and myalgias.   Skin:  Negative for rash.   Neurological:  Negative for dizziness, weakness, headaches and paresthesias.   Psychiatric/Behavioral:  Negative for mood changes. The patient is nervous/anxious.        OBJECTIVE:   There were no vitals taken for this visit.    Physical Exam  Constitutional:       General: He is not in acute distress.  HENT:      Head: Normocephalic and atraumatic.      Right Ear: External ear normal.      Left Ear: External ear normal.      Mouth/Throat:      Mouth: Mucous membranes are moist.      Pharynx: Oropharynx is clear. No oropharyngeal exudate or posterior oropharyngeal erythema.   Eyes:      Extraocular Movements: Extraocular movements intact.   Cardiovascular:      Rate and Rhythm: Normal rate and regular rhythm.      Heart sounds: Normal heart sounds.   Pulmonary:      Effort: Pulmonary effort is normal. No respiratory distress.      Breath sounds: Normal breath sounds. No wheezing or rhonchi.   Abdominal:      Palpations: Abdomen is soft. There is no mass.      Tenderness: There is no abdominal tenderness.   Musculoskeletal:         General: No deformity. Normal range of motion.      Cervical back: Normal range of motion and neck supple.   Skin:     General: Skin is warm.      Findings: No rash.   Neurological:      General: No focal deficit present.      Mental Status: He is alert and oriented to person, place, and time.   Psychiatric:         Mood and Affect: Mood normal.         Diagnostic Test Results:  Labs reviewed in Epic, no recent labs on file     ASSESSMENT/PLAN:     1. Encounter for Medicare annual wellness exam  2. Lipid screening  - Lipid panel reflex to direct LDL Non-fasting; Future  - Lipid panel reflex to direct LDL Non-fasting    3. Hyperlipidemia LDL goal <100  > patient currently taking 60 mg of Zocor nightly  -Refilled simvastatin (ZOCOR) 20 MG tablet; 20mg nightly  Dispense: 90 tablet; Refill: 3  -Refilled simvastatin  "(ZOCOR) 40 MG tablet; 40mg nightly in addition to your other prescription for 20mg nightly  Dispense: 90 tablet; Refill: 3    4. Encounter for immunization  > Administered today  - COVID-19 BIVALENT 18+ (MODERNA)    5. Hypothyroidism, unspecified type  -Refilled levothyroxine (SYNTHROID/LEVOTHROID) 50 MCG tablet; Take 1 tablet (50 mcg) by mouth daily  Dispense: 90 tablet; Refill: 3  - TSH with free T4 reflex; Future  - TSH with free T4 reflex    6. ABDULKADIR (obstructive sleep apnea)  - Order placed for new CPAP supplies CPAP Order for DME - ONLY FOR DME  - Basic metabolic panel  (Ca, Cl, CO2, Creat, Gluc, K, Na, BUN); Future  - Basic metabolic panel  (Ca, Cl, CO2, Creat, Gluc, K, Na, BUN)    7. Class 1 obesity due to excess calories with serious comorbidity and body mass index (BMI) of 32.0 to 32.9 in adult  8. Pain of left lower leg  > Discussed diet and exercise with the patient  - Informed patient that I believe his leg pain may be secondary to some form of sciatica, okay to continue with conservative treatment options at this time         Patient has been advised of split billing requirements and indicates understanding: Yes      COUNSELING:   Reviewed preventive health counseling, as reflected in patient instructions  Special attention given to:        Regular exercise       Healthy diet/nutrition      BMI:   Estimated body mass index is 31.22 kg/m  as calculated from the following:    Height as of 3/20/23: 1.635 m (5' 4.37\").    Weight as of 3/20/23: 83.5 kg (184 lb).   Weight management plan: Discussed healthy diet and exercise guidelines      He reports that he has never smoked. He has never used smokeless tobacco.            NELI RIVERA MD  [facility]  "

## 2023-08-01 ENCOUNTER — DOCUMENTATION ONLY (OUTPATIENT)
Dept: ANTICOAGULATION | Facility: CLINIC | Age: 48
End: 2023-08-01
Payer: COMMERCIAL

## 2023-08-01 ENCOUNTER — TELEPHONE (OUTPATIENT)
Dept: FAMILY MEDICINE | Facility: CLINIC | Age: 48
End: 2023-08-01
Payer: COMMERCIAL

## 2023-08-01 DIAGNOSIS — I26.99 PULMONARY EMBOLISM, UNSPECIFIED CHRONICITY, UNSPECIFIED PULMONARY EMBOLISM TYPE, UNSPECIFIED WHETHER ACUTE COR PULMONALE PRESENT (H): Primary | ICD-10-CM

## 2023-08-01 DIAGNOSIS — E78.5 HYPERLIPIDEMIA LDL GOAL <100: ICD-10-CM

## 2023-08-01 RX ORDER — SIMVASTATIN 40 MG
TABLET ORAL
Qty: 90 TABLET | Refills: 3 | Status: SHIPPED | OUTPATIENT
Start: 2023-08-01 | End: 2024-07-31

## 2023-08-01 NOTE — TELEPHONE ENCOUNTER
I spoke with Emili, pharmacist.  Pharmacy already received the correction.  Pt is taking 20 mg dose with 40 mg dose to equal 60 mg daily dose.    Tania Perkins RN

## 2023-08-01 NOTE — TELEPHONE ENCOUNTER
FYI - Status Update    Who is Calling: PHARMACY CALLING    Update: PLEASE CHECK DOSING INSTRUCTIONS FOR THE 40MG SIMVASTATIN.  IT IS WRITTEN '20MG NIGHTLY' WHEN IT SHOULD BE 60MG NIGHTLY.  PLEASE CORRECT ELECTRONICALLY (DR. RIVERA)    THANK YOU  Елена Shahbaz on 8/1/2023 at 10:56 AM

## 2023-08-01 NOTE — PROGRESS NOTES
ANTICOAGULATION CLINIC REFERRAL RENEWAL REQUEST       An annual renewal order is required for all patients referred to [facility].?  Please review and sign the pended referral order for Yusef Alvares.       ANTICOAGULATION SUMMARY      Warfarin indication(s)   PE    Mechanical heart valve present?  NO       Current goal range   INR: 2.0-3.0     Goal appropriate for indication? Goal INR 2-3, standard for indication(s) above     Time in Therapeutic Range (TTR)  (Goal > 60%) 75.5%       Office visit with referring provider's group within last year yes on 7/31/23       Jordan Norman RN  [facility]

## 2023-08-01 NOTE — RESULT ENCOUNTER NOTE
Herminio Alvares,     It is a pleasure providing you with medical care. I have received and reviewed your lab results, and have the following recommendations:     Based on the results of your lipid panel, you do have elevated triglycerides as well as unhealthy cholesterol.  At this time no medication changes are needed however it is very important that you Try to limit your dietary intake of fatty, greasy, oily, fried, take out, and fast food when possible. Also, I would suggest you cut back on red and processed meats, sodium and sugar-sweetened foods and beverages. Regarding exercise, please get at least 30 minutes of CONTINUOUS moderate intensity aerobic exercise at least 3 times per week.    Your basic metabolic panel indicates that you do not have any electrolyte abnormalities for you suspected to have kidney strain.  You had a mildly elevated CO2 and mildly elevated glucose.  At this time no interventions are needed.    Finally, your TSH levels were within normal limits indicating that you are not suspected to have significant thyroid abnormalities.    Sincerely,     Stephanie Ruiz MD

## 2023-08-04 ENCOUNTER — ANTICOAGULATION THERAPY VISIT (OUTPATIENT)
Dept: ANTICOAGULATION | Facility: CLINIC | Age: 48
End: 2023-08-04

## 2023-08-04 ENCOUNTER — LAB (OUTPATIENT)
Dept: LAB | Facility: CLINIC | Age: 48
End: 2023-08-04
Payer: COMMERCIAL

## 2023-08-04 DIAGNOSIS — Z79.01 LONG TERM CURRENT USE OF ANTICOAGULANT THERAPY: ICD-10-CM

## 2023-08-04 DIAGNOSIS — I26.99 PULMONARY EMBOLISM WITHOUT ACUTE COR PULMONALE, UNSPECIFIED CHRONICITY, UNSPECIFIED PULMONARY EMBOLISM TYPE (H): ICD-10-CM

## 2023-08-04 DIAGNOSIS — I26.99 PULMONARY EMBOLISM, UNSPECIFIED CHRONICITY, UNSPECIFIED PULMONARY EMBOLISM TYPE, UNSPECIFIED WHETHER ACUTE COR PULMONALE PRESENT (H): ICD-10-CM

## 2023-08-04 DIAGNOSIS — I26.99 PULMONARY EMBOLISM (H): Primary | ICD-10-CM

## 2023-08-04 LAB — INR BLD: 2.5 (ref 0.9–1.1)

## 2023-08-04 PROCEDURE — 85610 PROTHROMBIN TIME: CPT | Performed by: INTERNAL MEDICINE

## 2023-08-04 PROCEDURE — 36416 COLLJ CAPILLARY BLOOD SPEC: CPT | Performed by: INTERNAL MEDICINE

## 2023-08-04 NOTE — PROGRESS NOTES
ANTICOAGULATION MANAGEMENT     Yusef Alvares 47 year old male is on warfarin with therapeutic INR result. (Goal INR 2.0-3.0)    Recent labs: (last 7 days)     08/04/23  0854   INR 2.5*       ASSESSMENT     Source(s): Chart Review  Previous INR was Therapeutic last 2(+) visits  Medication, diet, health changes since last INR chart reviewed; none identified         PLAN     Recommended plan for no diet, medication or health factor changes affecting INR     Dosing Instructions: Continue your current warfarin dose with next INR in 6 weeks       Summary  As of 8/4/2023      Full warfarin instructions:  5 mg every Mon, Fri; 7.5 mg all other days   Next INR check:  9/15/2023               Detailed voice message left for Kosta with dosing instructions and follow up date.     Contact [phone]  to schedule and with any changes, questions or concerns.     Education provided:   Please call back if any changes to your diet, medications or how you've been taking warfarin  Goal range and lab monitoring: goal range and significance of current result    Plan made per [facility] anticoagulation protocol    Juliet Dwyer RN  Anticoagulation Clinic  8/4/2023    _______________________________________________________________________     Anticoagulation Episode Summary       Current INR goal:  2.0-3.0   TTR:  75.5 % (1 y)   Target end date:  Indefinite   Send INR reminders to:  [facility]    Indications    Pulmonary embolism (H) [I26.99]  Long term current use of anticoagulant therapy [Z79.01]  Pulmonary embolism without acute cor pulmonale  unspecified chronicity  unspecified pulmonary embolism type (H) [I26.99]  Pulmonary embolism  unspecified chronicity  unspecified pulmonary embolism type  unspecified whether acute cor pulmonale present (H) [I26.99]             Comments:  Hx PE. low protein C levels.             Anticoagulation Care Providers       Provider Role Specialty Phone number    Yusef Katz MD Referring Family  Medicine [phone]    eRnata Bangura,  [facility] Internal Medicine [phone]

## 2023-09-15 ENCOUNTER — LAB (OUTPATIENT)
Dept: LAB | Facility: CLINIC | Age: 48
End: 2023-09-15
Payer: COMMERCIAL

## 2023-09-15 ENCOUNTER — ANTICOAGULATION THERAPY VISIT (OUTPATIENT)
Dept: ANTICOAGULATION | Facility: CLINIC | Age: 48
End: 2023-09-15

## 2023-09-15 ENCOUNTER — TRANSFERRED RECORDS (OUTPATIENT)
Dept: HEALTH INFORMATION MANAGEMENT | Facility: CLINIC | Age: 48
End: 2023-09-15

## 2023-09-15 DIAGNOSIS — Z79.01 LONG TERM CURRENT USE OF ANTICOAGULANT THERAPY: ICD-10-CM

## 2023-09-15 DIAGNOSIS — I26.99 PULMONARY EMBOLISM (H): Primary | ICD-10-CM

## 2023-09-15 DIAGNOSIS — I26.99 PULMONARY EMBOLISM WITHOUT ACUTE COR PULMONALE, UNSPECIFIED CHRONICITY, UNSPECIFIED PULMONARY EMBOLISM TYPE (H): ICD-10-CM

## 2023-09-15 DIAGNOSIS — I26.99 PULMONARY EMBOLISM, UNSPECIFIED CHRONICITY, UNSPECIFIED PULMONARY EMBOLISM TYPE, UNSPECIFIED WHETHER ACUTE COR PULMONALE PRESENT (H): ICD-10-CM

## 2023-09-15 LAB — INR BLD: 3.4 (ref 0.9–1.1)

## 2023-09-15 PROCEDURE — 85610 PROTHROMBIN TIME: CPT

## 2023-09-15 PROCEDURE — 36416 COLLJ CAPILLARY BLOOD SPEC: CPT

## 2023-09-15 NOTE — PROGRESS NOTES
ANTICOAGULATION MANAGEMENT     Yusef Alvares 47 year old male is on warfarin with supratherapeutic INR result. (Goal INR 2.0-3.0)    Recent labs: (last 7 days)     09/15/23  0858   INR 3.4*       ASSESSMENT     Source(s): Chart Review and Patient/Caregiver Call     Warfarin doses taken: Warfarin taken as instructed  Diet: No new diet changes identified  Medication/supplement changes: None noted  New illness, injury, or hospitalization: No  Signs or symptoms of bleeding or clotting: No  Previous result: Therapeutic last 2(+) visits  Additional findings: None       PLAN     Recommended plan for no diet, medication or health factor changes affecting INR     Dosing Instructions: partial hold then decrease your warfarin dose (5.3% change) with next INR in 2 weeks       Summary  As of 9/15/2023      Full warfarin instructions:  9/15: 2.5 mg; Otherwise 5 mg every Mon, Fri; 7.5 mg all other days   Next INR check:  9/29/2023               Telephone call with Kosta who verbalizes understanding and agrees to plan    Lab visit scheduled    Education provided:   Goal range and lab monitoring: goal range and significance of current result    Plan made per [facility] anticoagulation protocol    Juliet Dwyer RN  Anticoagulation Clinic  9/15/2023    _______________________________________________________________________     Anticoagulation Episode Summary       Current INR goal:  2.0-3.0   TTR:  70.4 % (1 y)   Target end date:  Indefinite   Send INR reminders to:  [facility]    Indications    Pulmonary embolism (H) [I26.99]  Long term current use of anticoagulant therapy [Z79.01]  Pulmonary embolism without acute cor pulmonale  unspecified chronicity  unspecified pulmonary embolism type (H) [I26.99]  Pulmonary embolism  unspecified chronicity  unspecified pulmonary embolism type  unspecified whether acute cor pulmonale present (H) [I26.99]             Comments:  Hx PE. low protein C levels.             Anticoagulation Care  Providers       Provider Role Specialty Phone number    Yusef Katz MD Referring Family Medicine [phone]    Renata Bangura DO Referring Internal Medicine [phone]

## 2023-09-21 DIAGNOSIS — Z79.899 NEED FOR PROPHYLACTIC CHEMOTHERAPY: Primary | ICD-10-CM

## 2023-09-29 ENCOUNTER — LAB (OUTPATIENT)
Dept: LAB | Facility: CLINIC | Age: 48
End: 2023-09-29
Payer: COMMERCIAL

## 2023-09-29 ENCOUNTER — ANTICOAGULATION THERAPY VISIT (OUTPATIENT)
Dept: ANTICOAGULATION | Facility: CLINIC | Age: 48
End: 2023-09-29

## 2023-09-29 DIAGNOSIS — I26.99 PULMONARY EMBOLISM (H): Primary | ICD-10-CM

## 2023-09-29 DIAGNOSIS — Z79.01 LONG TERM CURRENT USE OF ANTICOAGULANT THERAPY: ICD-10-CM

## 2023-09-29 DIAGNOSIS — Z79.899 NEED FOR PROPHYLACTIC CHEMOTHERAPY: ICD-10-CM

## 2023-09-29 DIAGNOSIS — I26.99 PULMONARY EMBOLISM WITHOUT ACUTE COR PULMONALE, UNSPECIFIED CHRONICITY, UNSPECIFIED PULMONARY EMBOLISM TYPE (H): ICD-10-CM

## 2023-09-29 DIAGNOSIS — I26.99 PULMONARY EMBOLISM, UNSPECIFIED CHRONICITY, UNSPECIFIED PULMONARY EMBOLISM TYPE, UNSPECIFIED WHETHER ACUTE COR PULMONALE PRESENT (H): ICD-10-CM

## 2023-09-29 LAB
BUN SERPL-MCNC: 17.3 MG/DL (ref 6–20)
CALCIUM SERPL-MCNC: 9.1 MG/DL (ref 8.6–10)
DEPRECATED HCO3 PLAS-SCNC: 28 MMOL/L (ref 22–29)
FASTING STATUS PATIENT QL REPORTED: YES
GLUCOSE SERPL-MCNC: 102 MG/DL (ref 70–99)
HBA1C MFR BLD: 5.4 % (ref 0–5.6)
INR BLD: 2.4 (ref 0.9–1.1)
PROLACTIN SERPL 3RD IS-MCNC: 9 NG/ML (ref 4–15)

## 2023-09-29 PROCEDURE — 82374 ASSAY BLOOD CARBON DIOXIDE: CPT

## 2023-09-29 PROCEDURE — 36415 COLL VENOUS BLD VENIPUNCTURE: CPT

## 2023-09-29 PROCEDURE — 82310 ASSAY OF CALCIUM: CPT

## 2023-09-29 PROCEDURE — 82947 ASSAY GLUCOSE BLOOD QUANT: CPT

## 2023-09-29 PROCEDURE — 85610 PROTHROMBIN TIME: CPT

## 2023-09-29 PROCEDURE — 84520 ASSAY OF UREA NITROGEN: CPT

## 2023-09-29 PROCEDURE — 84146 ASSAY OF PROLACTIN: CPT

## 2023-09-29 PROCEDURE — 83036 HEMOGLOBIN GLYCOSYLATED A1C: CPT

## 2023-09-29 NOTE — PROGRESS NOTES
ANTICOAGULATION MANAGEMENT     Yusef Alvares 47 year old male is on warfarin with therapeutic INR result. (Goal INR 2.0-3.0)    Recent labs: (last 7 days)     09/29/23  0858   INR 2.4*       ASSESSMENT     Warfarin Lab Questionnaire    Warfarin Doses Last 7 Days      9/28/2023    10:22 AM   Dose in Tablet or Mg   TAB or MG? milligram (mg)     Pt Rptd Dose SUNDAY MONDAY TUESDAY WED THURS FRIDAY SATURDAY 9/28/2023  10:22 AM 7.5 5 7.5 5 7.5 5 7.5         9/28/2023   Warfarin Lab Questionnaire   Missed doses within past 14 days? No   Changes in diet or alcohol within past 14 days? No   Medication changes since last result? Yes   Please list: Dosage change for Warfarin; I take 5 MG for Monday, Wednesday, and Friday.   Injuries or illness since last result? No   New shortness of breath, severe headaches or sudden changes in vision since last result? No   Abnormal bleeding since last result? No   Upcoming surgery, procedure? No   Best number to call with results? [phone]     Previous result: Supratherapeutic  Additional findings: None       PLAN     Recommended plan for no diet, medication or health factor changes affecting INR     Dosing Instructions: Continue your current warfarin dose with next INR in 3 weeks       Summary  As of 9/29/2023      Full warfarin instructions:  5 mg every Mon, Wed, Fri; 7.5 mg all other days   Next INR check:  10/20/2023               Telephone call with Kosta who verbalizes understanding and agrees to plan    Lab visit scheduled    Education provided:   Goal range and lab monitoring: goal range and significance of current result    Plan made per [facility] anticoagulation protocol    Juliet Dwyer, KARTHIK  Anticoagulation Clinic  9/29/2023    _______________________________________________________________________     Anticoagulation Episode Summary       Current INR goal:  2.0-3.0   TTR:  68.9 % (1 y)   Target end date:  Indefinite   Send INR reminders to:  [facility]    Indications     Pulmonary embolism (H) [I26.99]  Long term current use of anticoagulant therapy [Z79.01]  Pulmonary embolism without acute cor pulmonale  unspecified chronicity  unspecified pulmonary embolism type (H) [I26.99]  Pulmonary embolism  unspecified chronicity  unspecified pulmonary embolism type  unspecified whether acute cor pulmonale present (H) [I26.99]             Comments:  Hx PE. low protein C levels.             Anticoagulation Care Providers       Provider Role Specialty Phone number    Yusef Katz MD Referring Family Medicine [phone]    Renata Bangura DO Referring Internal Medicine [phone]

## 2023-10-19 DIAGNOSIS — I26.99 ACUTE PULMONARY EMBOLISM WITHOUT ACUTE COR PULMONALE, UNSPECIFIED PULMONARY EMBOLISM TYPE (H): ICD-10-CM

## 2023-10-19 DIAGNOSIS — I26.99 PULMONARY EMBOLISM (H): ICD-10-CM

## 2023-10-19 DIAGNOSIS — I26.99 PULMONARY EMBOLISM WITHOUT ACUTE COR PULMONALE, UNSPECIFIED CHRONICITY, UNSPECIFIED PULMONARY EMBOLISM TYPE (H): ICD-10-CM

## 2023-10-19 DIAGNOSIS — Z79.01 LONG TERM CURRENT USE OF ANTICOAGULANT THERAPY: ICD-10-CM

## 2023-10-20 ENCOUNTER — LAB (OUTPATIENT)
Dept: LAB | Facility: CLINIC | Age: 48
End: 2023-10-20
Payer: COMMERCIAL

## 2023-10-20 ENCOUNTER — ANTICOAGULATION THERAPY VISIT (OUTPATIENT)
Dept: ANTICOAGULATION | Facility: CLINIC | Age: 48
End: 2023-10-20

## 2023-10-20 DIAGNOSIS — I26.99 PULMONARY EMBOLISM WITHOUT ACUTE COR PULMONALE, UNSPECIFIED CHRONICITY, UNSPECIFIED PULMONARY EMBOLISM TYPE (H): ICD-10-CM

## 2023-10-20 DIAGNOSIS — Z79.01 LONG TERM CURRENT USE OF ANTICOAGULANT THERAPY: ICD-10-CM

## 2023-10-20 DIAGNOSIS — I26.99 PULMONARY EMBOLISM (H): Primary | ICD-10-CM

## 2023-10-20 DIAGNOSIS — I26.99 PULMONARY EMBOLISM, UNSPECIFIED CHRONICITY, UNSPECIFIED PULMONARY EMBOLISM TYPE, UNSPECIFIED WHETHER ACUTE COR PULMONALE PRESENT (H): ICD-10-CM

## 2023-10-20 LAB — INR BLD: 2.9 (ref 0.9–1.1)

## 2023-10-20 PROCEDURE — 36416 COLLJ CAPILLARY BLOOD SPEC: CPT

## 2023-10-20 PROCEDURE — 85610 PROTHROMBIN TIME: CPT

## 2023-10-20 RX ORDER — WARFARIN SODIUM 5 MG/1
TABLET ORAL
Qty: 110 TABLET | Refills: 1 | Status: SHIPPED | OUTPATIENT
Start: 2023-10-20 | End: 2024-04-05

## 2023-10-20 NOTE — PROGRESS NOTES
ANTICOAGULATION MANAGEMENT     Yusef Alvares 48 year old male is on warfarin with therapeutic INR result. (Goal INR 2.0-3.0)    Recent labs: (last 7 days)     10/20/23  0857   INR 2.9*       ASSESSMENT     Source(s): Chart Review and Patient/Caregiver Call     Warfarin doses taken: Warfarin taken as instructed  Diet: No new diet changes identified  Medication/supplement changes: None noted  New illness, injury, or hospitalization: No  Signs or symptoms of bleeding or clotting: No  Previous result: Therapeutic last visit; previously outside of goal range  Additional findings: None       PLAN     Recommended plan for no diet, medication or health factor changes affecting INR     Dosing Instructions: Continue your current warfarin dose with next INR in 4 weeks       Summary  As of 10/20/2023      Full warfarin instructions:  5 mg every Mon, Wed, Fri; 7.5 mg all other days   Next INR check:  11/17/2023               Telephone call with Kosta who verbalizes understanding and agrees to plan    Lab visit scheduled    Education provided:   Goal range and lab monitoring: goal range and significance of current result    Plan made per [facility] anticoagulation protocol    Juliet Dwyer RN  Anticoagulation Clinic  10/20/2023    _______________________________________________________________________     Anticoagulation Episode Summary       Current INR goal:  2.0-3.0   TTR:  69.0% (1 y)   Target end date:  Indefinite   Send INR reminders to:  [facility] [place]    Indications    Pulmonary embolism (H) [I26.99]  Long term current use of anticoagulant therapy [Z79.01]  Pulmonary embolism without acute cor pulmonale  unspecified chronicity  unspecified pulmonary embolism type (H) [I26.99]  Pulmonary embolism  unspecified chronicity  unspecified pulmonary embolism type  unspecified whether acute cor pulmonale present (H) [I26.99]             Comments:  Hx PE. low protein C levels.             Anticoagulation Care Providers       Provider  Role Specialty Phone number    Yusef Katz MD Referring Family Medicine [phone]    Renata Bangura DO Referring Internal Medicine [phone]

## 2023-10-20 NOTE — TELEPHONE ENCOUNTER
ANTICOAGULATION MANAGEMENT:  Medication Refill    Anticoagulation Summary  As of 10/20/2023      Warfarin maintenance plan:  5 mg (5 mg x 1) every Mon, Wed, Fri; 7.5 mg (5 mg x 1.5) all other days   Next INR check:  11/17/2023   Target end date:  Indefinite    Indications    Pulmonary embolism (H) [I26.99]  Long term current use of anticoagulant therapy [Z79.01]  Pulmonary embolism without acute cor pulmonale  unspecified chronicity  unspecified pulmonary embolism type (H) [I26.99]  Pulmonary embolism  unspecified chronicity  unspecified pulmonary embolism type  unspecified whether acute cor pulmonale present (H) [I26.99]                 Anticoagulation Care Providers       Provider Role Specialty Phone number    Yusef Katz MD Referring Family Medicine [phone]    Renata Bangura DO Referring Internal Medicine [phone]            Refill Criteria    Visit with referring provider/group: Meets criteria: office visit within referring provider group in the last 1 year on 7/31/23    [facility] referral signed last signed: 08/02/2023; within last year: Yes    Lab monitoring not exceeding 2 weeks overdue: Yes    Yusef meets all criteria for refill. Rx instructions and quantity supplied updated to match patient's current dosing plan. Warfarin 90 day supply with 1 refill granted per [facility] protocol     Juliet Dwyer RN  Anticoagulation Clinic

## 2023-10-30 ENCOUNTER — OFFICE VISIT (OUTPATIENT)
Dept: FAMILY MEDICINE | Facility: CLINIC | Age: 48
End: 2023-10-30
Payer: COMMERCIAL

## 2023-10-30 VITALS
DIASTOLIC BLOOD PRESSURE: 84 MMHG | WEIGHT: 196 LBS | RESPIRATION RATE: 16 BRPM | BODY MASS INDEX: 32.65 KG/M2 | HEIGHT: 65 IN | OXYGEN SATURATION: 96 % | TEMPERATURE: 97.4 F | HEART RATE: 71 BPM | SYSTOLIC BLOOD PRESSURE: 132 MMHG

## 2023-10-30 DIAGNOSIS — Z00.00 NORMAL PHYSICAL EXAM: Primary | ICD-10-CM

## 2023-10-30 PROCEDURE — 99212 OFFICE O/P EST SF 10 MIN: CPT | Performed by: NURSE PRACTITIONER

## 2023-10-30 ASSESSMENT — PAIN SCALES - GENERAL: PAINLEVEL: NO PAIN (0)

## 2023-10-30 NOTE — PROGRESS NOTES
Assessment & Plan     Normal physical exam  Reviewed with patient that he did have a physical exam as well and has recommended care in July.  He notes he cannot recall this.  We did repeat a physical exam today which is without abnormal findings.  Reassurance is provided.  He will return to clinic as needed.         JANIA Gregg CNP  [facility]    Jyoti Brambila is a 48 year old, presenting for the following health issues:  Hyperlipidemia        10/30/2023     8:03 AM   Additional Questions   Roomed by Tennille QUINONES   Accompanied by self       History of Present Illness       Reason for visit:  Follow-up from yearly wellness examination    He eats 0-1 servings of fruits and vegetables daily.He consumes 3 sweetened beverage(s) daily.He exercises with enough effort to increase his heart rate 9 or less minutes per day.  He exercises with enough effort to increase his heart rate 3 or less days per week.   He is taking medications regularly.         Hyperlipidemia Follow-Up    Are you regularly taking any medication or supplement to lower your cholesterol?   Yes- simvastatin  Are you having muscle aches or other side effects that you think could be caused by your cholesterol lowering medication?  No    Patient was in for annual wellness exam in July and reports he was not given a physical exam so is requesting exam today. No complaints is just wanting to be preventative.     Above HPI reviewed. Additionally, was seen in July for preventative care, and is concerned that he did not receive an exam.  Chart reviewed, patient did receive physical exam.  Did have all recommended care at that time.  He has no specific complaints today, he would just like a physical examination.        Review of Systems   Constitutional, HEENT, cardiovascular, pulmonary, gi and gu systems are negative, except as otherwise noted.      Objective    /84   Pulse 71   Temp 97.4  F (36.3  C) (Tympanic)   Resp  16   Wt 88.9 kg (196 lb)   SpO2 96%   BMI 33.38 kg/m    Body mass index is 33.38 kg/m .  Physical Exam  Vitals and nursing note reviewed.   Constitutional:       Appearance: Normal appearance.   HENT:      Head: Normocephalic and atraumatic.      Right Ear: Tympanic membrane and ear canal normal.      Left Ear: Tympanic membrane and ear canal normal.      Nose: Nose normal. No rhinorrhea.      Right Sinus: No maxillary sinus tenderness or frontal sinus tenderness.      Left Sinus: No maxillary sinus tenderness or frontal sinus tenderness.      Mouth/Throat:      Lips: Pink.      Mouth: Mucous membranes are moist.      Pharynx: Oropharynx is clear.   Eyes:      General: Lids are normal.      Conjunctiva/sclera: Conjunctivae normal.      Comments: Non icteric   Cardiovascular:      Rate and Rhythm: Normal rate and regular rhythm.      Pulses: Normal pulses.      Heart sounds: Normal heart sounds, S1 normal and S2 normal.   Pulmonary:      Effort: Pulmonary effort is normal.      Breath sounds: Normal breath sounds and air entry.   Musculoskeletal:         General: No swelling. Normal range of motion.      Cervical back: Neck supple.      Right lower leg: No edema.      Left lower leg: No edema.   Lymphadenopathy:      Cervical: No cervical adenopathy.   Skin:     General: Skin is warm and dry.      Findings: No lesion or rash.   Neurological:      General: No focal deficit present.      Mental Status: He is alert and oriented to person, place, and time.   Psychiatric:         Mood and Affect: Mood normal.         Behavior: Behavior normal.         Thought Content: Thought content normal.         Judgment: Judgment normal.

## 2023-11-17 ENCOUNTER — LAB (OUTPATIENT)
Dept: LAB | Facility: CLINIC | Age: 48
End: 2023-11-17
Payer: COMMERCIAL

## 2023-11-17 ENCOUNTER — ANTICOAGULATION THERAPY VISIT (OUTPATIENT)
Dept: ANTICOAGULATION | Facility: CLINIC | Age: 48
End: 2023-11-17

## 2023-11-17 DIAGNOSIS — I26.99 PULMONARY EMBOLISM WITHOUT ACUTE COR PULMONALE, UNSPECIFIED CHRONICITY, UNSPECIFIED PULMONARY EMBOLISM TYPE (H): ICD-10-CM

## 2023-11-17 DIAGNOSIS — Z79.01 LONG TERM CURRENT USE OF ANTICOAGULANT THERAPY: ICD-10-CM

## 2023-11-17 DIAGNOSIS — I26.99 PULMONARY EMBOLISM (H): Primary | ICD-10-CM

## 2023-11-17 DIAGNOSIS — I26.99 PULMONARY EMBOLISM, UNSPECIFIED CHRONICITY, UNSPECIFIED PULMONARY EMBOLISM TYPE, UNSPECIFIED WHETHER ACUTE COR PULMONALE PRESENT (H): ICD-10-CM

## 2023-11-17 LAB — INR BLD: 2.8 (ref 0.9–1.1)

## 2023-11-17 PROCEDURE — 36416 COLLJ CAPILLARY BLOOD SPEC: CPT

## 2023-11-17 PROCEDURE — 85610 PROTHROMBIN TIME: CPT

## 2023-11-17 NOTE — PROGRESS NOTES
ANTICOAGULATION MANAGEMENT     Yusef Alvares 48 year old male is on warfarin with therapeutic INR result. (Goal INR 2.0-3.0)    Recent labs: (last 7 days)     11/17/23  0922   INR 2.8*       ASSESSMENT     Source(s): Chart Review and Patient/Caregiver Call     Warfarin doses taken: Warfarin taken as instructed  Diet: No new diet changes identified  Medication/supplement changes: None noted  New illness, injury, or hospitalization: No  Signs or symptoms of bleeding or clotting: No  Previous result: Therapeutic last 2(+) visits  Additional findings: None       PLAN     Recommended plan for no diet, medication or health factor changes affecting INR     Dosing Instructions: Continue your current warfarin dose with next INR in 5 weeks       Summary  As of 11/17/2023      Full warfarin instructions:  5 mg every Mon, Wed, Fri; 7.5 mg all other days   Next INR check:  12/22/2023               Telephone call with Kosta who verbalizes understanding and agrees to plan    Lab visit scheduled    Education provided:   Goal range and lab monitoring: goal range and significance of current result    Plan made per [facility] anticoagulation protocol    Juliet Dwyer RN  Anticoagulation Clinic  11/17/2023    _______________________________________________________________________     Anticoagulation Episode Summary       Current INR goal:  2.0-3.0   TTR:  70.7% (1 y)   Target end date:  Indefinite   Send IN[facility] reminders to:  [facility]    Indications    Pulmonary embolism (H) [I26.99]  Long term current use of anticoagulant therapy [Z79.01]  Pulmonary embolism without acute cor pulmonale  unspecified chronicity  unspecified pulmonary embolism type (H) [I26.99]  Pulmonary embolism  unspecified chronicity  unspecified pulmonary embolism type  unspecified whether acute cor pulmonale present (H) [I26.99]             Comments:  Hx PE. low protein C levels.             Anticoagulation Care Providers       Provider Role Specialty Phone number     Yusef Katz MD Referring Family Medicine [phone]    Renata Bangura DO Referring Internal Medicine [phone]

## 2023-11-27 ENCOUNTER — MYC MEDICAL ADVICE (OUTPATIENT)
Dept: FAMILY MEDICINE | Facility: CLINIC | Age: 48
End: 2023-11-27
Payer: COMMERCIAL

## 2023-12-04 ENCOUNTER — MEDICAL CORRESPONDENCE (OUTPATIENT)
Dept: HEALTH INFORMATION MANAGEMENT | Facility: CLINIC | Age: 48
End: 2023-12-04
Payer: COMMERCIAL

## 2023-12-04 ENCOUNTER — TRANSFERRED RECORDS (OUTPATIENT)
Dept: HEALTH INFORMATION MANAGEMENT | Facility: CLINIC | Age: 48
End: 2023-12-04
Payer: COMMERCIAL

## 2023-12-22 ENCOUNTER — ANTICOAGULATION THERAPY VISIT (OUTPATIENT)
Dept: ANTICOAGULATION | Facility: CLINIC | Age: 48
End: 2023-12-22

## 2023-12-22 ENCOUNTER — LAB (OUTPATIENT)
Dept: LAB | Facility: CLINIC | Age: 48
End: 2023-12-22
Payer: COMMERCIAL

## 2023-12-22 DIAGNOSIS — I26.99 PULMONARY EMBOLISM (H): Primary | ICD-10-CM

## 2023-12-22 DIAGNOSIS — Z79.01 LONG TERM CURRENT USE OF ANTICOAGULANT THERAPY: ICD-10-CM

## 2023-12-22 DIAGNOSIS — I26.99 PULMONARY EMBOLISM, UNSPECIFIED CHRONICITY, UNSPECIFIED PULMONARY EMBOLISM TYPE, UNSPECIFIED WHETHER ACUTE COR PULMONALE PRESENT (H): ICD-10-CM

## 2023-12-22 DIAGNOSIS — I26.99 PULMONARY EMBOLISM WITHOUT ACUTE COR PULMONALE, UNSPECIFIED CHRONICITY, UNSPECIFIED PULMONARY EMBOLISM TYPE (H): ICD-10-CM

## 2023-12-22 LAB — INR BLD: 1.9 (ref 0.9–1.1)

## 2023-12-22 PROCEDURE — 85610 PROTHROMBIN TIME: CPT

## 2023-12-22 PROCEDURE — 36416 COLLJ CAPILLARY BLOOD SPEC: CPT

## 2023-12-22 NOTE — PROGRESS NOTES
ANTICOAGULATION MANAGEMENT     Yusef Alvares 48 year old male is on warfarin with subtherapeutic INR result. (Goal INR 2.0-3.0)    Recent labs: (last 7 days)     12/22/23  0912   INR 1.9*       ASSESSMENT     Source(s): Chart Review and Patient/Caregiver Call     Warfarin doses taken: Warfarin taken as instructed  Diet: No new diet changes identified  Medication/supplement changes: None noted  New illness, injury, or hospitalization: No  Signs or symptoms of bleeding or clotting: No  Previous result: Therapeutic last 2(+) visits  Additional findings: None       PLAN     Recommended plan for no diet, medication or health factor changes affecting INR     Dosing Instructions: Continue your current warfarin dose with next INR in 2 weeks       Summary  As of 12/22/2023      Full warfarin instructions:  5 mg every Mon, Wed, Fri; 7.5 mg all other days   Next INR check:  1/5/2024               Telephone call with Kosta who verbalizes understanding and agrees to plan    Lab visit scheduled    Education provided:   Goal range and lab monitoring: goal range and significance of current result    Plan made per [facility] anticoagulation protocol    Juliet Dwyer RN  Anticoagulation Clinic  12/22/2023    _______________________________________________________________________     Anticoagulation Episode Summary       Current INR goal:  2.0-3.0   TTR:  74.1% (1 y)   Target end date:  Indefinite   Send INR reminders to:  [facility]    Indications    Pulmonary embolism (H) [I26.99]  Long term current use of anticoagulant therapy [Z79.01]  Pulmonary embolism without acute cor pulmonale  unspecified chronicity  unspecified pulmonary embolism type (H) [I26.99]  Pulmonary embolism  unspecified chronicity  unspecified pulmonary embolism type  unspecified whether acute cor pulmonale present (H) [I26.99]             Comments:  Hx PE. low protein C levels.             Anticoagulation Care Providers       Provider Role Specialty Phone number     Yusef Katz MD Referring Family Medicine [phone]    Renata Bangura DO Referring Internal Medicine [phone]

## 2023-12-27 ASSESSMENT — SLEEP AND FATIGUE QUESTIONNAIRES
HOW LIKELY ARE YOU TO NOD OFF OR FALL ASLEEP WHILE SITTING QUIETLY AFTER LUNCH WITHOUT ALCOHOL: WOULD NEVER DOZE
HOW LIKELY ARE YOU TO NOD OFF OR FALL ASLEEP IN A CAR, WHILE STOPPED FOR A FEW MINUTES IN TRAFFIC: WOULD NEVER DOZE
HOW LIKELY ARE YOU TO NOD OFF OR FALL ASLEEP WHEN YOU ARE A PASSENGER IN A CAR FOR AN HOUR WITHOUT A BREAK: WOULD NEVER DOZE
HOW LIKELY ARE YOU TO NOD OFF OR FALL ASLEEP WHILE WATCHING TV: WOULD NEVER DOZE
HOW LIKELY ARE YOU TO NOD OFF OR FALL ASLEEP WHILE SITTING AND READING: WOULD NEVER DOZE
HOW LIKELY ARE YOU TO NOD OFF OR FALL ASLEEP WHILE LYING DOWN TO REST IN THE AFTERNOON WHEN CIRCUMSTANCES PERMIT: WOULD NEVER DOZE
HOW LIKELY ARE YOU TO NOD OFF OR FALL ASLEEP WHILE SITTING INACTIVE IN A PUBLIC PLACE: WOULD NEVER DOZE
HOW LIKELY ARE YOU TO NOD OFF OR FALL ASLEEP WHILE SITTING AND TALKING TO SOMEONE: WOULD NEVER DOZE

## 2024-01-03 ENCOUNTER — OFFICE VISIT (OUTPATIENT)
Dept: SLEEP MEDICINE | Facility: CLINIC | Age: 49
End: 2024-01-03
Payer: COMMERCIAL

## 2024-01-03 VITALS
DIASTOLIC BLOOD PRESSURE: 91 MMHG | OXYGEN SATURATION: 97 % | WEIGHT: 203 LBS | BODY MASS INDEX: 33.82 KG/M2 | HEART RATE: 86 BPM | RESPIRATION RATE: 18 BRPM | SYSTOLIC BLOOD PRESSURE: 132 MMHG | HEIGHT: 65 IN

## 2024-01-03 DIAGNOSIS — G47.33 OSA (OBSTRUCTIVE SLEEP APNEA): Primary | ICD-10-CM

## 2024-01-03 PROCEDURE — 99203 OFFICE O/P NEW LOW 30 MIN: CPT | Performed by: INTERNAL MEDICINE

## 2024-01-03 NOTE — PATIENT INSTRUCTIONS

## 2024-01-03 NOTE — PROGRESS NOTES
[facility]   Outpatient Sleep Medicine Follow-up Visit  January 3, 2024    Name: Yusef Alvares MRN# [id number]   Age: 48 year old YOB: 1975     Date of Consultation: January 3, 2024  Consultation is requested by: No referring provider defined for this encounter.  Primary care provider: Yusef Katz           Assessment and Plan:     Sleep Diagnoses:   Moderate obstructive sleep apnea    Comorbid conditions:  Schizoaffective disorder  Pulm embolism with cor pulmonale 2021  Right-sided sciatica  BMI 30-35  Hyperlipidemia    Summary Recommendations:  Set phone alarm at 11 pm to remind you to put CPAP device on  Return to clinic in 3-4 months for prescription of replacement CPAP device  No orders of the defined types were placed in this encounter.           History of Present Illness:     Yusef Alvares is a 48 year old male with moderate obstructive sleep apnea and question of hypoxemia in 2015 study currently on fixed CPAP pressure of 12 with insignificant residual AHI and average use suboptimal at 140 minutes due to falling asleep watching TV with CPAP off face. He otherwise denies difficulty initiating or maintaining sleep      PREVIOUS SLEEP STUDIES: 2008 (AHI 13.6), HST 2015 (AHI 19.9)  Date: 2015 3.5-hour study  AHI: 19.9, under 97 pounds       Most Recent SCALES:    EPWORTH SLEEPINESS SCALE WITHIN 1 YEAR WITHIN 10 DAYS   Sitting and reading 0 0   Watching TV 0 0   Sitting, inactive in a public place (theatre or mtg.) 0  0    As a passenger in a car 0 0   Lying down to rest in the afternoon when circumstance permit 0 0   Sitting and talking to someone 0 0   Sitting quietly after lunch without alcohol 0 0   In a car, while stopped for a few minutes in traffic 0 0   TOTAL SCORE 0 0   Normal < 11         0--none    1--mild    2--moderate  3--severe      INSOMNIA SEVERITY INDEX WITHIN 1 YEAR   Difficulty falling asleep 0   Difficult staying asleep 2   Problems waking  up to early 2   How SATISFIED/DISSATISFIED are you with your CURRENT sleep pattern? 3   How NOTICEABLE to others do you think your sleep pattern is in terms of your quality of life? 1   How WORRIED/DISTRESSED are you about your current sleep pattern? 1   To what extent do you consider your sleep problem to INTERFERE with your daily fuctioning(e.g. daytime fatigue, mood, ability to function at work/daily chores, concentration, mood,etc.) CURRENTLY? 1   INSOMNIA SEVERITY INDEX TOTAL SCORE 10    --absence of insomnia (0-7); sub-threshold insomnia (8-14); moderate insomnia (15-21); and severe insomnia (22-28)--        Objective:  CPAP Compliance Targets:   >70% days > 4 hours AHI < 5   30 days ending January 3, 2024  Mask type:  Nasal Pillows   ResMed   CPAP 12.0 cmH2O 30 day usage data:  40% of days with > 4 hours of use. 13/30 days with no use.   Average use 140 minutes per day.   95%ile Leak 20.75 L/min.   AHI 3.92 events per hour.                     Medications:     Current Outpatient Medications   Medication Sig    ARIPiprazole (ABILIFY) 20 MG tablet Take 20 mg by mouth daily    atomoxetine (STRATTERA) 80 MG capsule Take 80 mg by mouth daily    citalopram (CELEXA) 40 MG tablet Take 40 mg by mouth daily.    enoxaparin ANTICOAGULANT (LOVENOX) 80 MG/0.8ML syringe Inject 0.8 mLs (80 mg) Subcutaneous every 12 hours As directed by INR clinic    levothyroxine (SYNTHROID/LEVOTHROID) 50 MCG tablet Take 1 tablet (50 mcg) by mouth daily    naltrexone (DEPADE;REVIA) 50 MG tablet Take 50 mg by mouth daily    risperiDONE (RISPERDAL) 0.25 MG tablet 0.25 mg as needed    simvastatin (ZOCOR) 20 MG tablet 20mg nightly    simvastatin (ZOCOR) 40 MG tablet Take nightly    warfarin ANTICOAGULANT (COUMADIN) 5 MG tablet TAKE 5 MG BY MOUTH EVERY MONDAY, Wednesday AND FRIDAY ; TAKE 7.5 MG ALL OTHER DAYS OR AS DIRECTED BY     No current facility-administered medications for this visit.        No Known Allergies         Problem List:      Patient Active Problem List   Diagnosis    Schizophrenia (H)    Weight gain    Hyperlipidemia LDL goal <100    Acne scarring    Hypothyroidism    TB (tuberculosis) contact    ABDULKADIR (obstructive sleep apnea)    Schizoaffective disorder (H)    CARDIOVASCULAR SCREENING; LDL GOAL LESS THAN 130    Lesions of both ulnar nerves    Right-sided low back pain with right-sided sciatica    Obesity (BMI 30.0-34.9)    Pulmonary embolism (H)    Long term current use of anticoagulant therapy    Pulmonary embolism without acute cor pulmonale, unspecified chronicity, unspecified pulmonary embolism type (H)    Pulmonary embolism, unspecified chronicity, unspecified pulmonary embolism type, unspecified whether acute cor pulmonale present (H)            Past Medical History:     Does not need 02 supplement at night   Past Medical History:   Diagnosis Date    Arthritis     Asperger's syndrome 01/01/2009    Bipolar affective disorder (H)     Depressive disorder     Hyperlipidemia     Mental health problem     ABDULKADIR (obstructive sleep apnea) 2008 / 2015    HST AHI 19.9, [place] 81%    Schizoaffective disorder (H)     Scoliosis 01/01/2004    Thyroid disease           Past Surgical History:   Procedure Laterality Date    COLONOSCOPY N/A 3/20/2023    Procedure: COLONOSCOPY;  Surgeon: Enid Sears MD;  Location: [facility]    DENTAL SURGERY      Extraction of abscess tooth    WISDOM [facility] GUIDEWIRE                Physical Examination:       Mandibular profile  Reported vitals:  There were no vitals taken for this visit.   GENERAL: Healthy, alert and no distress  EYES: Eyes grossly normal to inspection.  No discharge or erythema, or obvious scleral/conjunctival abnormalities.  RESP: No audible wheeze, cough, or visible cyanosis.  No visible retractions or increased work of breathing.    SKIN: Visible skin clear. No significant rash, abnormal pigmentation or lesions.  NEURO: Cranial nerves grossly intact.  Mentation and speech appropriate for  age.  PSYCH: Mentation appears normal, affect normal/bright, judgement and insight intact, normal speech and appearance well-groomed.        Copy to: Yusef Katz MD 1/3/2024         Total time spent reviewing medical records including previous testing and interpretation as well as direct patient contact and documentation on this date: 30 min

## 2024-01-03 NOTE — NURSING NOTE
Re-measured Blood Pressure, Pt BP is still HIGH. Pt is non symptomatic and recommended to follow up with primary. Scheduled 3 month follow up for 4/3/2024 with Dr. Tran. Printed AVS.     [facility]   Clinic Assistant  [facility]

## 2024-01-05 ENCOUNTER — LAB (OUTPATIENT)
Dept: LAB | Facility: CLINIC | Age: 49
End: 2024-01-05
Payer: COMMERCIAL

## 2024-01-05 ENCOUNTER — ANTICOAGULATION THERAPY VISIT (OUTPATIENT)
Dept: ANTICOAGULATION | Facility: CLINIC | Age: 49
End: 2024-01-05

## 2024-01-05 DIAGNOSIS — I26.99 PULMONARY EMBOLISM WITHOUT ACUTE COR PULMONALE, UNSPECIFIED CHRONICITY, UNSPECIFIED PULMONARY EMBOLISM TYPE (H): ICD-10-CM

## 2024-01-05 DIAGNOSIS — I26.99 PULMONARY EMBOLISM (H): Primary | ICD-10-CM

## 2024-01-05 DIAGNOSIS — I26.99 PULMONARY EMBOLISM, UNSPECIFIED CHRONICITY, UNSPECIFIED PULMONARY EMBOLISM TYPE, UNSPECIFIED WHETHER ACUTE COR PULMONALE PRESENT (H): ICD-10-CM

## 2024-01-05 DIAGNOSIS — Z79.01 LONG TERM CURRENT USE OF ANTICOAGULANT THERAPY: ICD-10-CM

## 2024-01-05 LAB — INR BLD: 2.5 (ref 0.9–1.1)

## 2024-01-05 PROCEDURE — 85610 PROTHROMBIN TIME: CPT

## 2024-01-05 PROCEDURE — 36416 COLLJ CAPILLARY BLOOD SPEC: CPT

## 2024-01-05 NOTE — PROGRESS NOTES
ANTICOAGULATION MANAGEMENT     Yusef MARSHALL Alvares 48 year old male is on warfarin with therapeutic INR result. (Goal INR 2.0-3.0)    Recent labs: (last 7 days)     01/05/24  0923   INR 2.5*       ASSESSMENT     Warfarin Lab Questionnaire    Warfarin Doses Last 7 Days      1/4/2024     8:45 PM   Dose in Tablet or Mg   TAB or MG? milligram (mg)     Pt Rptd Dose SUNDAY MONDAY TUESDAY WED THURS FRIDAY SATURDAY 1/4/2024   8:45 PM 7.5 5 7.5 5 7.5 5 7.5         1/4/2024   Warfarin Lab Questionnaire   Missed doses within past 14 days? No   Changes in diet or alcohol within past 14 days? No   Medication changes since last result? No   Injuries or illness since last result? No   New shortness of breath, severe headaches or sudden changes in vision since last result? No   Abnormal bleeding since last result? No   Upcoming surgery, procedure? No   Best number to call with results? [phone]     Previous result: Subtherapeutic  Additional findings: None       PLAN     Recommended plan for no diet, medication or health factor changes affecting INR     Dosing Instructions: Continue your current warfarin dose with next INR in 3 weeks       Summary  As of 1/5/2024      Full warfarin instructions:  5 mg every Mon, Wed, Fri; 7.5 mg all other days   Next INR check:  1/26/2024               Telephone call with Kosta who verbalizes understanding and agrees to plan    Lab visit scheduled    Education provided:   Please call back if any changes to your diet, medications or how you've been taking warfarin  Goal range and lab monitoring: goal range and significance of current result, Importance of therapeutic range, and Importance of following up at instructed interval    Plan made per [facility] anticoagulation protocol    Kristina Vyas, RN  Anticoagulation Clinic  1/5/2024    _______________________________________________________________________     Anticoagulation Episode Summary       Current INR goal:  2.0-3.0   TTR:  74.7% (1 y)   Target end  date:  Indefinite   Send INR reminders to:  [facility]    Indications    Pulmonary embolism (H) [I26.99]  Long term current use of anticoagulant therapy [Z79.01]  Pulmonary embolism without acute cor pulmonale  unspecified chronicity  unspecified pulmonary embolism type (H) [I26.99]  Pulmonary embolism  unspecified chronicity  unspecified pulmonary embolism type  unspecified whether acute cor pulmonale present (H) [I26.99]             Comments:  Hx PE. low protein C levels.             Anticoagulation Care Providers       Provider Role Specialty Phone number    Yusef Katz MD Referring Family Medicine [phone]    Renata Bangura DO Referring Internal Medicine [phone]

## 2024-01-26 ENCOUNTER — LAB (OUTPATIENT)
Dept: LAB | Facility: CLINIC | Age: 49
End: 2024-01-26
Payer: COMMERCIAL

## 2024-01-26 ENCOUNTER — ANTICOAGULATION THERAPY VISIT (OUTPATIENT)
Dept: ANTICOAGULATION | Facility: CLINIC | Age: 49
End: 2024-01-26

## 2024-01-26 DIAGNOSIS — I26.99 PULMONARY EMBOLISM (H): Primary | ICD-10-CM

## 2024-01-26 DIAGNOSIS — I26.99 PULMONARY EMBOLISM WITHOUT ACUTE COR PULMONALE, UNSPECIFIED CHRONICITY, UNSPECIFIED PULMONARY EMBOLISM TYPE (H): ICD-10-CM

## 2024-01-26 DIAGNOSIS — I26.99 PULMONARY EMBOLISM, UNSPECIFIED CHRONICITY, UNSPECIFIED PULMONARY EMBOLISM TYPE, UNSPECIFIED WHETHER ACUTE COR PULMONALE PRESENT (H): ICD-10-CM

## 2024-01-26 DIAGNOSIS — Z79.01 LONG TERM CURRENT USE OF ANTICOAGULANT THERAPY: ICD-10-CM

## 2024-01-26 LAB — INR BLD: 2.2 (ref 0.9–1.1)

## 2024-01-26 PROCEDURE — 85610 PROTHROMBIN TIME: CPT

## 2024-01-26 PROCEDURE — 36416 COLLJ CAPILLARY BLOOD SPEC: CPT

## 2024-01-26 NOTE — PROGRESS NOTES
ANTICOAGULATION MANAGEMENT     Yusef Alvares 48 year old male is on warfarin with therapeutic INR result. (Goal INR 2.0-3.0)    Recent labs: (last 7 days)     01/26/24  0923   INR 2.2*       ASSESSMENT     Warfarin Lab Questionnaire    Warfarin Doses Last 7 Days      1/25/2024    10:37 AM   Dose in Tablet or Mg   TAB or MG? milligram (mg)     Pt Rptd Dose SUNDAY MONDAY TUESDAY WED THURS FRIDAY SATURDAY 1/25/2024  10:37 AM 7.5 5 7.5 5 7.5 5 7.5         1/25/2024   Warfarin Lab Questionnaire   Missed doses within past 14 days? No   Changes in diet or alcohol within past 14 days? No   Medication changes since last result? No   Injuries or illness since last result? No   New shortness of breath, severe headaches or sudden changes in vision since last result? No   Abnormal bleeding since last result? No   Upcoming surgery, procedure? No   Best number to call with results? [phone]     Previous result: Therapeutic last visit; previously outside of goal range  Additional findings: None       PLAN     Recommended plan for no diet, medication or health factor changes affecting INR     Dosing Instructions: Continue your current warfarin dose with next INR in 4 weeks       Summary  As of 1/26/2024      Full warfarin instructions:  5 mg every Mon, Wed, Fri; 7.5 mg all other days   Next INR check:  2/23/2024               Telephone call with Kosta who verbalizes understanding and agrees to plan    Lab visit scheduled    Education provided:   Please call back if any changes to your diet, medications or how you've been taking warfarin  Contact [phone]  with any changes, questions or concerns.     Plan made per [facility] anticoagulation protocol    Suzie Fish, RN  Anticoagulation Clinic  1/26/2024    _______________________________________________________________________     Anticoagulation Episode Summary       Current INR goal:  2.0-3.0   TTR:  79.5% (1 y)   Target end date:  Indefinite   Send INR reminders to:  [facility]  [place]    Indications    Pulmonary embolism (H) [I26.99]  Long term current use of anticoagulant therapy [Z79.01]  Pulmonary embolism without acute cor pulmonale  unspecified chronicity  unspecified pulmonary embolism type (H) [I26.99]  Pulmonary embolism  unspecified chronicity  unspecified pulmonary embolism type  unspecified whether acute cor pulmonale present (H) [I26.99]             Comments:  Hx PE. low protein C levels.             Anticoagulation Care Providers       Provider Role Specialty Phone number    Yusef Katz MD Referring Family Medicine [phone]    Renata Bangura DO Referring Internal Medicine [phone]

## 2024-02-01 ENCOUNTER — MYC MEDICAL ADVICE (OUTPATIENT)
Dept: FAMILY MEDICINE | Facility: CLINIC | Age: 49
End: 2024-02-01
Payer: COMMERCIAL

## 2024-02-23 ENCOUNTER — ANTICOAGULATION THERAPY VISIT (OUTPATIENT)
Dept: ANTICOAGULATION | Facility: CLINIC | Age: 49
End: 2024-02-23

## 2024-02-23 ENCOUNTER — LAB (OUTPATIENT)
Dept: LAB | Facility: CLINIC | Age: 49
End: 2024-02-23
Payer: COMMERCIAL

## 2024-02-23 DIAGNOSIS — I26.99 PULMONARY EMBOLISM (H): Primary | ICD-10-CM

## 2024-02-23 DIAGNOSIS — Z79.01 LONG TERM CURRENT USE OF ANTICOAGULANT THERAPY: ICD-10-CM

## 2024-02-23 DIAGNOSIS — I26.99 PULMONARY EMBOLISM, UNSPECIFIED CHRONICITY, UNSPECIFIED PULMONARY EMBOLISM TYPE, UNSPECIFIED WHETHER ACUTE COR PULMONALE PRESENT (H): ICD-10-CM

## 2024-02-23 DIAGNOSIS — I26.99 PULMONARY EMBOLISM WITHOUT ACUTE COR PULMONALE, UNSPECIFIED CHRONICITY, UNSPECIFIED PULMONARY EMBOLISM TYPE (H): ICD-10-CM

## 2024-02-23 LAB — INR BLD: 2.5 (ref 0.9–1.1)

## 2024-02-23 PROCEDURE — 85610 PROTHROMBIN TIME: CPT

## 2024-02-23 PROCEDURE — 36416 COLLJ CAPILLARY BLOOD SPEC: CPT

## 2024-02-23 NOTE — PROGRESS NOTES
ANTICOAGULATION MANAGEMENT     Yusef Alvares 48 year old male is on warfarin with therapeutic INR result. (Goal INR 2.0-3.0)    Recent labs: (last 7 days)     02/23/24  0924   INR 2.5*       ASSESSMENT     Source(s): Chart Review and Patient/Caregiver Call     Warfarin doses taken: Warfarin taken as instructed  Diet: No new diet changes identified  Medication/supplement changes: None noted  New illness, injury, or hospitalization: No  Signs or symptoms of bleeding or clotting: No  Previous result: Therapeutic last 2(+) visits  Additional findings: None but did advise to let [facility] know if he decides to start any new vitamins/supplements       PLAN     Recommended plan for no diet, medication or health factor changes affecting INR     Dosing Instructions: Continue your current warfarin dose with next INR in 5 weeks       Summary  As of 2/23/2024      Full warfarin instructions:  5 mg every Mon, Wed, Fri; 7.5 mg all other days   Next INR check:  3/29/2024               Telephone call with Kosta who verbalizes understanding and agrees to plan    Lab visit scheduled    Education provided:   Please call back if any changes to your diet, medications or how you've been taking warfarin  Importance of notifying anticoagulation clinic for: changes in medications; a sooner lab recheck maybe needed  Contact [phone]  with any changes, questions or concerns.     Plan made per [facility] anticoagulation protocol    Suzie Fish RN  Anticoagulation Clinic  2/23/2024    _______________________________________________________________________     Anticoagulation Episode Summary       Current INR goal:  2.0-3.0   TTR:  79.5% (1 y)   Target end date:  Indefinite   [facility]    Indications    Pulmonary embolism (H) [I26.99]  Long term current use of anticoagulant therapy [Z79.01]  Pulmonary embolism without acute cor pulmonale  unspecified chronicity  unspecified pulmonary embolism type (H)  [I26.99]  Pulmonary embolism  unspecified chronicity  unspecified pulmonary embolism type  unspecified whether acute cor pulmonale present (H) [I26.99]             Comments:  Hx PE. low protein C levels.             Anticoagulation Care Providers       Provider Role Specialty Phone number    Yusef Katz MD Referring Family Medicine [phone]    Renata Bangura DO Referring Internal Medicine [phone]

## 2024-03-04 ENCOUNTER — TRANSFERRED RECORDS (OUTPATIENT)
Dept: HEALTH INFORMATION MANAGEMENT | Facility: CLINIC | Age: 49
End: 2024-03-04
Payer: COMMERCIAL

## 2024-03-25 ENCOUNTER — MYC MEDICAL ADVICE (OUTPATIENT)
Dept: FAMILY MEDICINE | Facility: CLINIC | Age: 49
End: 2024-03-25
Payer: COMMERCIAL

## 2024-03-25 NOTE — TELEPHONE ENCOUNTER
MyChart reply sent to patient and closing encounter.     Isabella Bailon RN  [facility]

## 2024-03-27 ASSESSMENT — SLEEP AND FATIGUE QUESTIONNAIRES
HOW LIKELY ARE YOU TO NOD OFF OR FALL ASLEEP WHILE SITTING AND READING: WOULD NEVER DOZE
HOW LIKELY ARE YOU TO NOD OFF OR FALL ASLEEP WHILE WATCHING TV: WOULD NEVER DOZE
HOW LIKELY ARE YOU TO NOD OFF OR FALL ASLEEP WHILE SITTING INACTIVE IN A PUBLIC PLACE: WOULD NEVER DOZE
HOW LIKELY ARE YOU TO NOD OFF OR FALL ASLEEP IN A CAR, WHILE STOPPED FOR A FEW MINUTES IN TRAFFIC: WOULD NEVER DOZE
HOW LIKELY ARE YOU TO NOD OFF OR FALL ASLEEP WHILE SITTING QUIETLY AFTER LUNCH WITHOUT ALCOHOL: WOULD NEVER DOZE
HOW LIKELY ARE YOU TO NOD OFF OR FALL ASLEEP WHILE LYING DOWN TO REST IN THE AFTERNOON WHEN CIRCUMSTANCES PERMIT: SLIGHT CHANCE OF DOZING
HOW LIKELY ARE YOU TO NOD OFF OR FALL ASLEEP WHILE SITTING AND TALKING TO SOMEONE: WOULD NEVER DOZE
HOW LIKELY ARE YOU TO NOD OFF OR FALL ASLEEP WHEN YOU ARE A PASSENGER IN A CAR FOR AN HOUR WITHOUT A BREAK: WOULD NEVER DOZE

## 2024-03-29 ENCOUNTER — ANTICOAGULATION THERAPY VISIT (OUTPATIENT)
Dept: ANTICOAGULATION | Facility: CLINIC | Age: 49
End: 2024-03-29

## 2024-03-29 ENCOUNTER — LAB (OUTPATIENT)
Dept: LAB | Facility: CLINIC | Age: 49
End: 2024-03-29
Payer: COMMERCIAL

## 2024-03-29 DIAGNOSIS — Z79.01 LONG TERM CURRENT USE OF ANTICOAGULANT THERAPY: ICD-10-CM

## 2024-03-29 DIAGNOSIS — I26.99 PULMONARY EMBOLISM (H): Primary | ICD-10-CM

## 2024-03-29 DIAGNOSIS — I26.99 PULMONARY EMBOLISM, UNSPECIFIED CHRONICITY, UNSPECIFIED PULMONARY EMBOLISM TYPE, UNSPECIFIED WHETHER ACUTE COR PULMONALE PRESENT (H): ICD-10-CM

## 2024-03-29 DIAGNOSIS — I26.99 PULMONARY EMBOLISM WITHOUT ACUTE COR PULMONALE, UNSPECIFIED CHRONICITY, UNSPECIFIED PULMONARY EMBOLISM TYPE (H): ICD-10-CM

## 2024-03-29 LAB — INR BLD: 1.9 (ref 0.9–1.1)

## 2024-03-29 PROCEDURE — 36416 COLLJ CAPILLARY BLOOD SPEC: CPT

## 2024-03-29 PROCEDURE — 85610 PROTHROMBIN TIME: CPT

## 2024-03-29 NOTE — PROGRESS NOTES
ANTICOAGULATION MANAGEMENT     Yusef Alvares 48 year old male is on warfarin with subtherapeutic INR result. (Goal INR 2.0-3.0)    Recent labs: (last 7 days)     03/29/24  0924   INR 1.9*       ASSESSMENT     Source(s): Chart Review and Patient/Caregiver Call     Warfarin doses taken:  states he may have missed a half dose somewhere  Diet: No new diet changes identified  Medication/supplement changes: None noted  New illness, injury, or hospitalization: No  Signs or symptoms of bleeding or clotting: No  Previous result: Therapeutic last 2(+) visits  Additional findings: None       PLAN     Recommended plan for possible temporary change(s) affecting INR     Dosing Instructions: Continue your current warfarin dose with next INR in 2 weeks       Summary  As of 3/29/2024      Full warfarin instructions:  5 mg every Mon, Wed, Fri; 7.5 mg all other days   Next INR check:  4/12/2024               Telephone call with Kosta who verbalizes understanding and agrees to plan    Lab visit scheduled    Education provided:   Please call back if any changes to your diet, medications or how you've been taking warfarin  Contact [phone]  with any changes, questions or concerns.     Plan made per [facility] anticoagulation protocol    Suzie Fish RN  Anticoagulation Clinic  3/29/2024    _______________________________________________________________________     Anticoagulation Episode Summary       Current INR goal:  2.0-3.0   TTR:  81.1% (1 y)   Target end date:  Indefinite   Send INR reminders to:  [facility]    Indications    Pulmonary embolism (H) [I26.99]  Long term current use of anticoagulant therapy [Z79.01]  Pulmonary embolism without acute cor pulmonale  unspecified chronicity  unspecified pulmonary embolism type (H) [I26.99]  Pulmonary embolism  unspecified chronicity  unspecified pulmonary embolism type  unspecified whether acute cor pulmonale present (H) [I26.99]             Comments:  Hx PE. low protein C levels.              Anticoagulation Care Providers       Provider Role Specialty Phone number    Yusef Katz MD Referring Family Medicine [phone]    Renata Bangura DO Referring Internal Medicine [phone]

## 2024-04-03 ENCOUNTER — OFFICE VISIT (OUTPATIENT)
Dept: SLEEP MEDICINE | Facility: CLINIC | Age: 49
End: 2024-04-03
Payer: COMMERCIAL

## 2024-04-03 ENCOUNTER — OFFICE VISIT (OUTPATIENT)
Dept: FAMILY MEDICINE | Facility: CLINIC | Age: 49
End: 2024-04-03
Payer: COMMERCIAL

## 2024-04-03 VITALS
BODY MASS INDEX: 33.92 KG/M2 | SYSTOLIC BLOOD PRESSURE: 131 MMHG | DIASTOLIC BLOOD PRESSURE: 89 MMHG | OXYGEN SATURATION: 96 % | RESPIRATION RATE: 16 BRPM | HEART RATE: 69 BPM | WEIGHT: 203.6 LBS | HEIGHT: 65 IN

## 2024-04-03 VITALS
HEIGHT: 65 IN | DIASTOLIC BLOOD PRESSURE: 72 MMHG | RESPIRATION RATE: 16 BRPM | HEART RATE: 94 BPM | WEIGHT: 203.2 LBS | BODY MASS INDEX: 33.85 KG/M2 | TEMPERATURE: 98.7 F | OXYGEN SATURATION: 96 % | SYSTOLIC BLOOD PRESSURE: 124 MMHG

## 2024-04-03 DIAGNOSIS — E03.9 HYPOTHYROIDISM, UNSPECIFIED TYPE: ICD-10-CM

## 2024-04-03 DIAGNOSIS — E78.5 HYPERLIPIDEMIA LDL GOAL <130: Primary | ICD-10-CM

## 2024-04-03 DIAGNOSIS — F20.9 SCHIZOPHRENIA, UNSPECIFIED TYPE (H): ICD-10-CM

## 2024-04-03 DIAGNOSIS — G47.33 OSA (OBSTRUCTIVE SLEEP APNEA): Primary | ICD-10-CM

## 2024-04-03 DIAGNOSIS — Z51.81 ENCOUNTER FOR THERAPEUTIC DRUG MONITORING: ICD-10-CM

## 2024-04-03 DIAGNOSIS — E80.6 HYPERBILIRUBINEMIA: ICD-10-CM

## 2024-04-03 DIAGNOSIS — R73.9 HYPERGLYCEMIA: ICD-10-CM

## 2024-04-03 PROCEDURE — 99213 OFFICE O/P EST LOW 20 MIN: CPT | Performed by: FAMILY MEDICINE

## 2024-04-03 PROCEDURE — 99214 OFFICE O/P EST MOD 30 MIN: CPT | Performed by: INTERNAL MEDICINE

## 2024-04-03 ASSESSMENT — PAIN SCALES - GENERAL: PAINLEVEL: NO PAIN (0)

## 2024-04-03 NOTE — PATIENT INSTRUCTIONS
Schedule fasting lab appointment for late June 2024 or early July 2024.    Medication refills can be completed end of July 2024 after your lab tests are completed.    See psychiatry as scheduled.    Be consistent with low salt, low trans fat and low saturated fat diet.  Eat food rich in omega-3-fatty acids as you tolerate. (salmon, olive oil)  Eat 5 cups of vegetables, fruits and whole grains per day.  Limit starchy food (white rice, white bread, white pasta, white potatoes) to less than a cup per meal.  Minimize sweets, junk food and fastfood. Limit soda beverages to one serving per day; best to avoid it altogether though.    Exercise: moderate intensity sustained for at least 30 mins per episode, goal of 150 mins per week at least  Combine cardiovascular and resistance exercises.  These exercise recommendations are in addition to your daily activity at work or home.  Work on losing weight.

## 2024-04-03 NOTE — PROGRESS NOTES
[facility]   Outpatient Sleep Medicine Follow-up Visit  April 3, 2024    Name: Yusef Alvares MRN# [id number]   Age: 48 year old YOB: 1975     Date of Consultation: April 3, 2024  Consultation is requested by: No referring provider defined for this encounter.  Primary care provider: No Ref-Primary, Physician           Assessment and Plan:     Sleep Diagnoses:  Moderate obstructive sleep apnea    Comorbid conditions:  Bipolar disorder  Asperger syndrome    Summary Recommendations:  Replacement CPAP device with return to clinic < 90 days  Counseling as noted below           History of Present Illness:     Yusef Alvares is a 48 year old male with a history of mild to moderate obstructive sleep apnea currently on fixed CPAP of 12 with average use 4.5 hours and AHI of less than 2.  He has noticed some drying of his airway and increased thickness of mucus.  Bedtime 11-12 am sometimes forgets to put mask on.  We have counseled him on placing mask on pillow after getting up in the morning as a reminder to put it back on again and increasing settings on the humidifier so that it improves symptoms but does not produce water in the tubing system.      PREVIOUS HOME SLEEP STUDIES:  Date: 1/11/2015 BMI 32.8 3.5 hours of sleep  AHI: 19.9 resting saturation 90% 80 minutes of hypoxemia less than 90%    Date: 7/30/2008  193 pounds  AHI of 13.6 without hypoxemia             Most Recent SCALES:    EPWORTH SLEEPINESS SCALE WITHIN 1 YEAR WITHIN 10 DAYS   Sitting and reading 0 0   Watching TV 0 0   Sitting, inactive in a public place (theatre or mtg.) 0  0    As a passenger in a car 0 0   Lying down to rest in the afternoon when circumstance permit 1 1   Sitting and talking to someone 0 0   Sitting quietly after lunch without alcohol 0 0   In a car, while stopped for a few minutes in traffic 0 0   TOTAL SCORE 1 1   Normal < 11         0--none    1--mild    2--moderate  3--severe      INSOMNIA  SEVERITY INDEX WITHIN 1 YEAR   Difficulty falling asleep 0   Difficult staying asleep 0   Problems waking up to early 1   How SATISFIED/DISSATISFIED are you with your CURRENT sleep pattern? 1   How NOTICEABLE to others do you think your sleep pattern is in terms of your quality of life? 4   How WORRIED/DISTRESSED are you about your current sleep pattern? 2   To what extent do you consider your sleep problem to INTERFERE with your daily fuctioning(e.g. daytime fatigue, mood, ability to function at work/daily chores, concentration, mood,etc.) CURRENTLY? 0   INSOMNIA SEVERITY INDEX TOTAL SCORE 8    --absence of insomnia (0-7); sub-threshold insomnia (8-14); moderate insomnia (15-21); and severe insomnia (22-28)--          No data recorded    Objective:  CPAP Compliance Targets:   >70% days > 4 hours AHI < 5   30 days ending April 3, 2024  Mask type:  Nasal Pillows   ResMed   CPAP 12.0 cmH2O 30 day usage data:  90% of days with > 4 hours of use. 3/30 days with no use.   Average use 271 minutes per day.   95%ile Leak 25.99 L/min.   AHI 1.77 events per hour.                 Medications:     Current Outpatient Medications   Medication Sig Dispense Refill    ARIPiprazole (ABILIFY) 20 MG tablet Take 20 mg by mouth daily      atomoxetine (STRATTERA) 80 MG capsule Take 80 mg by mouth daily      citalopram (CELEXA) 40 MG tablet Take 40 mg by mouth daily.      levothyroxine (SYNTHROID/LEVOTHROID) 50 MCG tablet Take 1 tablet (50 mcg) by mouth daily 90 tablet 3    naltrexone (DEPADE;REVIA) 50 MG tablet Take 50 mg by mouth daily      risperiDONE (RISPERDAL) 0.25 MG tablet 0.25 mg as needed      simvastatin (ZOCOR) 20 MG tablet 20mg nightly 90 tablet 3    simvastatin (ZOCOR) 40 MG tablet Take nightly 90 tablet 3    warfarin ANTICOAGULANT (COUMADIN) 5 MG tablet TAKE 5 MG BY MOUTH EVERY MONDAY, Wednesday AND FRIDAY ; TAKE 7.5 MG ALL OTHER DAYS OR AS DIRECTED  tablet 1     No current facility-administered medications for this  visit.        No Known Allergies         Problem List:     Patient Active Problem List   Diagnosis    Schizophrenia (H)    Weight gain    Hyperlipidemia LDL goal <100    Acne scarring    Hypothyroidism    TB (tuberculosis) contact    ABDULKADIR (obstructive sleep apnea)    Schizoaffective disorder (H)    CARDIOVASCULAR SCREENING; LDL GOAL LESS THAN 130    Lesions of both ulnar nerves    Right-sided low back pain with right-sided sciatica    Obesity (BMI 30.0-34.9)    Pulmonary embolism (H)    Long term current use of anticoagulant therapy    Pulmonary embolism without acute cor pulmonale, unspecified chronicity, unspecified pulmonary embolism type (H)    Pulmonary embolism, unspecified chronicity, unspecified pulmonary embolism type, unspecified whether acute cor pulmonale present (H)            Past Medical History:     Does not need 02 supplement at night   Past Medical History:   Diagnosis Date    Arthritis     Asperger's syndrome 01/01/2009    Bipolar affective disorder (H)     Depressive disorder     Hyperlipidemia     Mental health problem     ABDULKAIDR (obstructive sleep apnea) 2008 / 2015    HST AHI 19.9, [place] 81%    Schizoaffective disorder (H)     Scoliosis 01/01/2004    Thyroid disease              Past Surgical History:    No h/o  upper airway surgery  Past Surgical History:   Procedure Laterality Date    COLONOSCOPY N/A 3/20/2023    Procedure: COLONOSCOPY;  Surgeon: Enid Sears MD;  Location: [facility]    DENTAL SURGERY      Extraction of abscess tooth    WISDOM [facility] GUIDEWIRE                Physical Examination:   Objective:      Reported vitals:  There were no vitals taken for this visit.   GENERAL: alert and no distress  EYES: Eyes grossly normal to inspection.  No discharge or erythema, or obvious scleral/conjunctival abnormalities.  RESP: No audible wheeze, cough, or visible cyanosis.    SKIN: Visible skin clear. No significant rash, abnormal pigmentation or lesions.  NEURO: Cranial nerves grossly intact.   Mentation and speech appropriate for age.  PSYCH: Appropriate affect, tone, and pace of words        Copy to: No Ref-Primary, Physician      TE MOREJON MD 4/3/2024         Total time spent reviewing medical records including previous testing and interpretation as well as direct patient contact and documentation on this date: 30 min

## 2024-04-03 NOTE — PROGRESS NOTES
"  Assessment & Plan     Hyperlipidemia LDL goal <130  Reinforced heart healthy lifestyle.   - Comprehensive metabolic panel  - Lipid panel reflex to direct LDL Fasting    Hypothyroidism, unspecified type  Stable. Lab due in the summer.  - TSH with free T4 reflex    Schizophrenia, unspecified type (H)  Managed by psychiatry. Needs surveillance labs for his antipsychotics ordered per patient.  - CBC with Platelets & Differential  - Comprehensive metabolic panel  - Hemoglobin A1c    Encounter for therapeutic drug monitoring  - CBC with Platelets & Differential  - Comprehensive metabolic panel  - Lipid panel reflex to direct LDL Fasting  - Hemoglobin A1c    Hyperglycemia  Has had intermittent hyperglycemia in previous labs  Advised carb control and more regular exercise.  - Hemoglobin A1c      BMI  Estimated body mass index is 34.27 kg/m  as calculated from the following:    Height as of this encounter: 1.64 m (5' 4.57\").    Weight as of this encounter: 92.2 kg (203 lb 3.2 oz).   Weight management plan: Discussed healthy diet and exercise guidelines      Patient Instructions   Schedule fasting lab appointment for late June 2024 or early July 2024.    Medication refills can be completed end of July 2024 after your lab tests are completed.    See psychiatry as scheduled.    Be consistent with low salt, low trans fat and low saturated fat diet.  Eat food rich in omega-3-fatty acids as you tolerate. (salmon, olive oil)  Eat 5 cups of vegetables, fruits and whole grains per day.  Limit starchy food (white rice, white bread, white pasta, white potatoes) to less than a cup per meal.  Minimize sweets, junk food and fastfood. Limit soda beverages to one serving per day; best to avoid it altogether though.    Exercise: moderate intensity sustained for at least 30 mins per episode, goal of 150 mins per week at least  Combine cardiovascular and resistance exercises.  These exercise recommendations are in addition to your daily " "activity at work or home.  Work on losing weight.      Jyoti Brambila is a 48 year old, presenting for the following health issues:  Establish Care (Declined immunizations today)        4/3/2024     2:16 PM   Additional Questions   Roomed by Patito HALL CMA   Accompanied by Self         4/3/2024     2:16 PM   Patient Reported Additional Medications   Patient reports taking the following new medications None     History of Present Illness       Reason for visit:  Looking for a new primary care physician    He eats 0-1 servings of fruits and vegetables daily.He consumes 5 sweetened beverage(s) daily.He exercises with enough effort to increase his heart rate 9 or less minutes per day.  He exercises with enough effort to increase his heart rate 3 or less days per week.   He is taking medications regularly.         Patient presents in clinic today to establish care. Patient used to see Dr. Kosta Katz and has since left. Needs someone to manage his medications. Patient states that he has a psych provider that manages his mental health medications at this time. Patient states that he isn't needing any refills today.        Review of Systems  Constitutional, HEENT, cardiovascular, pulmonary, GI, , musculoskeletal, neuro, skin, endocrine and psych systems are negative, except as otherwise noted.      Objective    /72   Pulse 94   Temp 98.7  F (37.1  C) (Tympanic)   Resp 16   Ht 1.64 m (5' 4.57\")   Wt 92.2 kg (203 lb 3.2 oz)   SpO2 96%   BMI 34.27 kg/m    Body mass index is 34.27 kg/m .  Physical Exam   GENERAL: alert and no distress  EYES: no icterus, PERRLA  NECK: no tenderness, no adenopathy,  Thyroid not enlarged  RESP: lungs clear to auscultation - no rales, no rhonchi, no wheezes  CV: regular rates and rhythm, no murmur  MS: no edema  SKIN: no jaundice or rash  NEURO: no tremors  PSYCH:  appropriately dressed, linear thought process, normal speech, normal mood, appropriate affect, no suicidality, no " aggression, no hallucination  ABD: protuberant, nontender     No results found for any visits on 04/03/24.        Signed Electronically by: Brooks Uriarte MD

## 2024-04-05 DIAGNOSIS — I26.99 PULMONARY EMBOLISM (H): ICD-10-CM

## 2024-04-05 DIAGNOSIS — I26.99 ACUTE PULMONARY EMBOLISM WITHOUT ACUTE COR PULMONALE, UNSPECIFIED PULMONARY EMBOLISM TYPE (H): ICD-10-CM

## 2024-04-05 DIAGNOSIS — I26.99 PULMONARY EMBOLISM WITHOUT ACUTE COR PULMONALE, UNSPECIFIED CHRONICITY, UNSPECIFIED PULMONARY EMBOLISM TYPE (H): ICD-10-CM

## 2024-04-05 DIAGNOSIS — Z79.01 LONG TERM CURRENT USE OF ANTICOAGULANT THERAPY: ICD-10-CM

## 2024-04-05 RX ORDER — WARFARIN SODIUM 5 MG/1
TABLET ORAL
Qty: 110 TABLET | Refills: 1 | Status: SHIPPED | OUTPATIENT
Start: 2024-04-05 | End: 2024-08-15

## 2024-04-05 NOTE — TELEPHONE ENCOUNTER
ANTICOAGULATION MANAGEMENT:  Medication Refill    Anticoagulation Summary  As of 3/29/2024      Warfarin maintenance plan:  5 mg (5 mg x 1) every Mon, Wed, Fri; 7.5 mg (5 mg x 1.5) all other days   Next INR check:  4/12/2024   Target end date:  Indefinite    Indications    Pulmonary embolism (H) [I26.99]  Long term current use of anticoagulant therapy [Z79.01]  Pulmonary embolism without acute cor pulmonale  unspecified chronicity  unspecified pulmonary embolism type (H) [I26.99]  Pulmonary embolism  unspecified chronicity  unspecified pulmonary embolism type  unspecified whether acute cor pulmonale present (H) [I26.99]                 Anticoagulation Care Providers       Provider Role Specialty Phone number    Yusef Katz MD Referring Family Medicine [phone]    Renata Bangura DO Referring Internal Medicine [phone]            Refill Criteria    Visit with referring provider/group: Meets criteria: office visit within referring provider group in the last 1 year on 4/3/24    [facility] referral last signed: 08/02/2023; within last year: Yes    Lab monitoring not exceeding 2 weeks overdue: Yes    Yusef meets all criteria for refill. Rx instructions and quantity supplied updated to match patient's current dosing plan. Warfarin 90 day supply with 1 refill granted per [facility] protocol     Juliet Dwyer RN  Anticoagulation Clinic

## 2024-04-08 ENCOUNTER — TRANSFERRED RECORDS (OUTPATIENT)
Dept: HEALTH INFORMATION MANAGEMENT | Facility: CLINIC | Age: 49
End: 2024-04-08
Payer: COMMERCIAL

## 2024-04-12 ENCOUNTER — LAB (OUTPATIENT)
Dept: LAB | Facility: CLINIC | Age: 49
End: 2024-04-12
Payer: COMMERCIAL

## 2024-04-12 ENCOUNTER — ANTICOAGULATION THERAPY VISIT (OUTPATIENT)
Dept: ANTICOAGULATION | Facility: CLINIC | Age: 49
End: 2024-04-12

## 2024-04-12 DIAGNOSIS — E78.5 HYPERLIPIDEMIA LDL GOAL <130: ICD-10-CM

## 2024-04-12 DIAGNOSIS — R73.9 HYPERGLYCEMIA: ICD-10-CM

## 2024-04-12 DIAGNOSIS — I26.99 PULMONARY EMBOLISM (H): Primary | ICD-10-CM

## 2024-04-12 DIAGNOSIS — Z79.01 LONG TERM CURRENT USE OF ANTICOAGULANT THERAPY: ICD-10-CM

## 2024-04-12 DIAGNOSIS — I26.99 PULMONARY EMBOLISM WITHOUT ACUTE COR PULMONALE, UNSPECIFIED CHRONICITY, UNSPECIFIED PULMONARY EMBOLISM TYPE (H): ICD-10-CM

## 2024-04-12 DIAGNOSIS — Z51.81 ENCOUNTER FOR THERAPEUTIC DRUG MONITORING: ICD-10-CM

## 2024-04-12 DIAGNOSIS — I26.99 PULMONARY EMBOLISM, UNSPECIFIED CHRONICITY, UNSPECIFIED PULMONARY EMBOLISM TYPE, UNSPECIFIED WHETHER ACUTE COR PULMONALE PRESENT (H): ICD-10-CM

## 2024-04-12 DIAGNOSIS — F20.9 SCHIZOPHRENIA, UNSPECIFIED TYPE (H): ICD-10-CM

## 2024-04-12 DIAGNOSIS — E03.9 HYPOTHYROIDISM, UNSPECIFIED TYPE: ICD-10-CM

## 2024-04-12 LAB
ALBUMIN SERPL BCG-MCNC: 4.3 G/DL (ref 3.5–5.2)
ALP SERPL-CCNC: 51 U/L (ref 40–150)
ALT SERPL W P-5'-P-CCNC: 25 U/L (ref 0–70)
ANION GAP SERPL CALCULATED.3IONS-SCNC: 8 MMOL/L (ref 7–15)
AST SERPL W P-5'-P-CCNC: 21 U/L (ref 0–45)
BASOPHILS # BLD AUTO: 0.1 10E3/UL (ref 0–0.2)
BASOPHILS NFR BLD AUTO: 1 %
BILIRUB SERPL-MCNC: 1.7 MG/DL
BUN SERPL-MCNC: 19 MG/DL (ref 6–20)
CALCIUM SERPL-MCNC: 9.4 MG/DL (ref 8.6–10)
CHLORIDE SERPL-SCNC: 101 MMOL/L (ref 98–107)
CHOLEST SERPL-MCNC: 198 MG/DL
CREAT SERPL-MCNC: 0.96 MG/DL (ref 0.67–1.17)
DEPRECATED HCO3 PLAS-SCNC: 33 MMOL/L (ref 22–29)
EGFRCR SERPLBLD CKD-EPI 2021: >90 ML/MIN/1.73M2
EOSINOPHIL # BLD AUTO: 0.2 10E3/UL (ref 0–0.7)
EOSINOPHIL NFR BLD AUTO: 3 %
ERYTHROCYTE [DISTWIDTH] IN BLOOD BY AUTOMATED COUNT: 12.1 % (ref 10–15)
FASTING STATUS PATIENT QL REPORTED: YES
GLUCOSE SERPL-MCNC: 107 MG/DL (ref 70–99)
HBA1C MFR BLD: 5.7 % (ref 0–5.6)
HCT VFR BLD AUTO: 48.9 % (ref 40–53)
HDLC SERPL-MCNC: 46 MG/DL
HGB BLD-MCNC: 16 G/DL (ref 13.3–17.7)
IMM GRANULOCYTES # BLD: 0 10E3/UL
IMM GRANULOCYTES NFR BLD: 0 %
INR BLD: 2.3 (ref 0.9–1.1)
LDLC SERPL CALC-MCNC: 124 MG/DL
LYMPHOCYTES # BLD AUTO: 1.6 10E3/UL (ref 0.8–5.3)
LYMPHOCYTES NFR BLD AUTO: 25 %
MCH RBC QN AUTO: 29.1 PG (ref 26.5–33)
MCHC RBC AUTO-ENTMCNC: 32.7 G/DL (ref 31.5–36.5)
MCV RBC AUTO: 89 FL (ref 78–100)
MONOCYTES # BLD AUTO: 0.6 10E3/UL (ref 0–1.3)
MONOCYTES NFR BLD AUTO: 10 %
NEUTROPHILS # BLD AUTO: 4.1 10E3/UL (ref 1.6–8.3)
NEUTROPHILS NFR BLD AUTO: 62 %
NONHDLC SERPL-MCNC: 152 MG/DL
PLATELET # BLD AUTO: 202 10E3/UL (ref 150–450)
POTASSIUM SERPL-SCNC: 4 MMOL/L (ref 3.4–5.3)
PROT SERPL-MCNC: 7 G/DL (ref 6.4–8.3)
RBC # BLD AUTO: 5.49 10E6/UL (ref 4.4–5.9)
SODIUM SERPL-SCNC: 142 MMOL/L (ref 135–145)
TRIGL SERPL-MCNC: 140 MG/DL
TSH SERPL DL<=0.005 MIU/L-ACNC: 1.65 UIU/ML (ref 0.3–4.2)
WBC # BLD AUTO: 6.6 10E3/UL (ref 4–11)

## 2024-04-12 PROCEDURE — 85025 COMPLETE CBC W/AUTO DIFF WBC: CPT

## 2024-04-12 PROCEDURE — 85610 PROTHROMBIN TIME: CPT

## 2024-04-12 PROCEDURE — 83036 HEMOGLOBIN GLYCOSYLATED A1C: CPT

## 2024-04-12 PROCEDURE — 84443 ASSAY THYROID STIM HORMONE: CPT

## 2024-04-12 PROCEDURE — 36415 COLL VENOUS BLD VENIPUNCTURE: CPT

## 2024-04-12 PROCEDURE — 80061 LIPID PANEL: CPT

## 2024-04-12 PROCEDURE — 80053 COMPREHEN METABOLIC PANEL: CPT

## 2024-04-12 NOTE — PROGRESS NOTES
ANTICOAGULATION MANAGEMENT     Yusef Alvares 48 year old male is on warfarin with therapeutic INR result. (Goal INR 2.0-3.0)    Recent labs: (last 7 days)     04/12/24  0917   INR 2.3*       ASSESSMENT     Source(s): Chart Review and Patient/Caregiver Call     Warfarin doses taken: Warfarin taken as instructed  Diet: No new diet changes identified  Medication/supplement changes: None noted  New illness, injury, or hospitalization: No  Signs or symptoms of bleeding or clotting: No  Previous result: Subtherapeutic  Additional findings: None       PLAN     Recommended plan for no diet, medication or health factor changes affecting INR     Dosing Instructions: Continue your current warfarin dose with next INR in 3 weeks       Summary  As of 4/12/2024      Full warfarin instructions:  5 mg every Mon, Wed, Fri; 7.5 mg all other days   Next INR check:  5/3/2024               Telephone call with Kosta who verbalizes understanding and agrees to plan    Lab visit scheduled    Education provided:   Goal range and lab monitoring: goal range and significance of current result    Plan made per [facility] anticoagulation protocol    Juliet Dwyer RN  Anticoagulation Clinic  4/12/2024    _______________________________________________________________________     Anticoagulation Episode Summary       Current INR goal:  2.0-3.0   TTR:  83.4% (1 y)   Target end date:  Indefinite   Send INR reminders to:  [facility]    Indications    Pulmonary embolism (H) [I26.99]  Long term current use of anticoagulant therapy [Z79.01]  Pulmonary embolism without acute cor pulmonale  unspecified chronicity  unspecified pulmonary embolism type (H) [I26.99]  Pulmonary embolism  unspecified chronicity  unspecified pulmonary embolism type  unspecified whether acute cor pulmonale present (H) [I26.99]             Comments:  Hx PE. low protein C levels.             Anticoagulation Care Providers       Provider Role Specialty Phone number    Yusef Katz  MD ROLANDO Referring Family Medicine [phone]    Renata Bangura DO Referring Internal Medicine [phone]

## 2024-04-19 ENCOUNTER — DOCUMENTATION ONLY (OUTPATIENT)
Dept: SLEEP MEDICINE | Facility: CLINIC | Age: 49
End: 2024-04-19
Payer: COMMERCIAL

## 2024-04-19 DIAGNOSIS — G47.33 OSA (OBSTRUCTIVE SLEEP APNEA): Primary | ICD-10-CM

## 2024-05-03 ENCOUNTER — LAB (OUTPATIENT)
Dept: LAB | Facility: CLINIC | Age: 49
End: 2024-05-03
Payer: COMMERCIAL

## 2024-05-03 ENCOUNTER — ANTICOAGULATION THERAPY VISIT (OUTPATIENT)
Dept: ANTICOAGULATION | Facility: CLINIC | Age: 49
End: 2024-05-03

## 2024-05-03 DIAGNOSIS — I26.99 PULMONARY EMBOLISM, UNSPECIFIED CHRONICITY, UNSPECIFIED PULMONARY EMBOLISM TYPE, UNSPECIFIED WHETHER ACUTE COR PULMONALE PRESENT (H): ICD-10-CM

## 2024-05-03 DIAGNOSIS — I26.99 PULMONARY EMBOLISM (H): Primary | ICD-10-CM

## 2024-05-03 DIAGNOSIS — I26.99 PULMONARY EMBOLISM WITHOUT ACUTE COR PULMONALE, UNSPECIFIED CHRONICITY, UNSPECIFIED PULMONARY EMBOLISM TYPE (H): ICD-10-CM

## 2024-05-03 DIAGNOSIS — Z79.01 LONG TERM CURRENT USE OF ANTICOAGULANT THERAPY: ICD-10-CM

## 2024-05-03 LAB — INR BLD: 2.4 (ref 0.9–1.1)

## 2024-05-03 PROCEDURE — 36416 COLLJ CAPILLARY BLOOD SPEC: CPT

## 2024-05-03 PROCEDURE — 85610 PROTHROMBIN TIME: CPT

## 2024-05-03 NOTE — PROGRESS NOTES
ANTICOAGULATION MANAGEMENT     Yusef Alvares 48 year old male is on warfarin with therapeutic INR result. (Goal INR 2.0-3.0)    Recent labs: (last 7 days)     05/03/24  0905   INR 2.4*       ASSESSMENT     Warfarin Lab Questionnaire    Warfarin Doses Last 7 Days      5/2/2024    10:32 AM   Dose in Tablet or Mg   TAB or MG? milligram (mg)     Pt Rptd Dose SUNDAY MONDAY TUESDAY WED THURS FRIDAY SATURDAY 5/2/2024  10:32 AM 7.5 5 7.5 5 7.5 5 7.5         5/2/2024   Warfarin Lab Questionnaire   Missed doses within past 14 days? No   Changes in diet or alcohol within past 14 days? No   Medication changes since last result? No   Injuries or illness since last result? No   New shortness of breath, severe headaches or sudden changes in vision since last result? No   Abnormal bleeding since last result? Yes   If yes, please explain: Found blood in my mouth after waking up, but it didn't continue to bleed and I think it was dry mouth from my CPAP machne.   Upcoming surgery, procedure? No   Best number to call with results? [phone]     Previous result: Therapeutic last visit; previously outside of goal range  Additional findings: No recurrence of bleeding, patient thinks it was likely due to dry mouth and reports no further concerns. He will notify [facility] if this changes.       PLAN     Recommended plan for no diet, medication or health factor changes affecting INR     Dosing Instructions: Continue your current warfarin dose with next INR in 4 weeks       Summary  As of 5/3/2024      Full warfarin instructions:  5 mg every Mon, Wed, Fri; 7.5 mg all other days   Next INR check:  5/31/2024               Telephone call with Kosta who verbalizes understanding and agrees to plan    Lab visit scheduled    Education provided:   Symptom monitoring: monitoring for bleeding signs and symptoms and when to seek medical attention/emergency care  Importance of notifying anticoagulation clinic for: changes in medications; a sooner lab  recheck maybe needed and diarrhea, nausea/vomiting, reduced intake, cold/flu, and/or infections; a sooner lab recheck maybe needed  Contact [phone]  with any changes, questions or concerns.     Plan made per [facility] anticoagulation protocol    Sonja Stone, RN  Anticoagulation Clinic  5/3/2024    _______________________________________________________________________     Anticoagulation Episode Summary       Current INR goal:  2.0-3.0   TTR:  88.7% (1 y)   Target end date:  Indefinite   Send INR reminders to:  [facility]    Indications    Pulmonary embolism (H) [I26.99]  Long term current use of anticoagulant therapy [Z79.01]  Pulmonary embolism without acute cor pulmonale  unspecified chronicity  unspecified pulmonary embolism type (H) [I26.99]  Pulmonary embolism  unspecified chronicity  unspecified pulmonary embolism type  unspecified whether acute cor pulmonale present (H) [I26.99]             Comments:  Hx PE. low protein C levels.             Anticoagulation Care Providers       Provider Role Specialty Phone number    Yusef Katz MD Referring Family Medicine [phone]    Renata Bangura DO Referring Internal Medicine [phone]

## 2024-05-06 ENCOUNTER — TRANSFERRED RECORDS (OUTPATIENT)
Dept: HEALTH INFORMATION MANAGEMENT | Facility: CLINIC | Age: 49
End: 2024-05-06
Payer: COMMERCIAL

## 2024-05-06 ENCOUNTER — TELEPHONE (OUTPATIENT)
Dept: FAMILY MEDICINE | Facility: CLINIC | Age: 49
End: 2024-05-06
Payer: COMMERCIAL

## 2024-05-06 NOTE — TELEPHONE ENCOUNTER
Forms/Letter Request    Type of form/letter: OTHER: Lab results        Patient requested the past months lab results be faxed to patients psychiatrist from [facility]. Faxed on 5/6/2024.     Emily Bhat  [facility], Primary Care

## 2024-05-31 ENCOUNTER — ANTICOAGULATION THERAPY VISIT (OUTPATIENT)
Dept: ANTICOAGULATION | Facility: CLINIC | Age: 49
End: 2024-05-31

## 2024-05-31 ENCOUNTER — LAB (OUTPATIENT)
Dept: LAB | Facility: CLINIC | Age: 49
End: 2024-05-31
Payer: COMMERCIAL

## 2024-05-31 DIAGNOSIS — I26.99 PULMONARY EMBOLISM, UNSPECIFIED CHRONICITY, UNSPECIFIED PULMONARY EMBOLISM TYPE, UNSPECIFIED WHETHER ACUTE COR PULMONALE PRESENT (H): ICD-10-CM

## 2024-05-31 DIAGNOSIS — E80.6 HYPERBILIRUBINEMIA: ICD-10-CM

## 2024-05-31 DIAGNOSIS — I26.99 PULMONARY EMBOLISM (H): Primary | ICD-10-CM

## 2024-05-31 DIAGNOSIS — I26.99 PULMONARY EMBOLISM WITHOUT ACUTE COR PULMONALE, UNSPECIFIED CHRONICITY, UNSPECIFIED PULMONARY EMBOLISM TYPE (H): ICD-10-CM

## 2024-05-31 DIAGNOSIS — Z79.01 LONG TERM CURRENT USE OF ANTICOAGULANT THERAPY: ICD-10-CM

## 2024-05-31 LAB
ALBUMIN SERPL BCG-MCNC: 4 G/DL (ref 3.5–5.2)
ALP SERPL-CCNC: 49 U/L (ref 40–150)
ALT SERPL W P-5'-P-CCNC: 29 U/L (ref 0–70)
AST SERPL W P-5'-P-CCNC: 22 U/L (ref 0–45)
BILIRUB DIRECT SERPL-MCNC: <0.2 MG/DL (ref 0–0.3)
BILIRUB SERPL-MCNC: 1 MG/DL
INR BLD: 3.4 (ref 0.9–1.1)
PROT SERPL-MCNC: 6.6 G/DL (ref 6.4–8.3)

## 2024-05-31 PROCEDURE — 80076 HEPATIC FUNCTION PANEL: CPT

## 2024-05-31 PROCEDURE — 85610 PROTHROMBIN TIME: CPT

## 2024-05-31 PROCEDURE — 36415 COLL VENOUS BLD VENIPUNCTURE: CPT

## 2024-05-31 NOTE — PROGRESS NOTES
ANTICOAGULATION MANAGEMENT     Yusef Alvares 48 year old male is on warfarin with supratherapeutic INR result. (Goal INR 2.0-3.0)    Recent labs: (last 7 days)     05/31/24  0924   INR 3.4*       ASSESSMENT     Source(s): Chart Review and Patient/Caregiver Call     Warfarin doses taken: Warfarin taken as instructed  Diet: No new diet changes identified  Medication/supplement changes: None noted  New illness, injury, or hospitalization: No  Signs or symptoms of bleeding or clotting: No  Previous result: Therapeutic last 2(+) visits  Additional findings: None       PLAN     Recommended plan for no diet, medication or health factor changes affecting INR     Dosing Instructions: decrease your warfarin dose (5.6% change) with next INR in 2 weeks       Summary  As of 5/31/2024      Full warfarin instructions:  7.5 mg every Sun, Tue, Thu; 5 mg all other days   Next INR check:  6/14/2024               Telephone call with Kosta who verbalizes understanding and agrees to plan    Lab visit scheduled    Education provided:   Goal range and lab monitoring: goal range and significance of current result    Plan made per [facility] anticoagulation protocol    Juliet Dwyer RN  Anticoagulation Clinic  5/31/2024    _______________________________________________________________________     Anticoagulation Episode Summary       Current INR goal:  2.0-3.0   TTR:  86.0% (1 y)   Target end date:  Indefinite   Send INR reminders to:  [facility]    Indications    Pulmonary embolism (H) [I26.99]  Long term current use of anticoagulant therapy [Z79.01]  Pulmonary embolism without acute cor pulmonale  unspecified chronicity  unspecified pulmonary embolism type (H) [I26.99]  Pulmonary embolism  unspecified chronicity  unspecified pulmonary embolism type  unspecified whether acute cor pulmonale present (H) [I26.99]             Comments:  Hx PE. low protein C levels.             Anticoagulation Care Providers       Provider Role Specialty Phone  number    Yusef Katz MD Referring Family Medicine [phone]    Renata Bangura DO Referring Internal Medicine [phone]

## 2024-06-14 ENCOUNTER — ANTICOAGULATION THERAPY VISIT (OUTPATIENT)
Dept: ANTICOAGULATION | Facility: CLINIC | Age: 49
End: 2024-06-14

## 2024-06-14 ENCOUNTER — LAB (OUTPATIENT)
Dept: LAB | Facility: CLINIC | Age: 49
End: 2024-06-14
Payer: COMMERCIAL

## 2024-06-14 DIAGNOSIS — I26.99 PULMONARY EMBOLISM, UNSPECIFIED CHRONICITY, UNSPECIFIED PULMONARY EMBOLISM TYPE, UNSPECIFIED WHETHER ACUTE COR PULMONALE PRESENT (H): ICD-10-CM

## 2024-06-14 DIAGNOSIS — Z79.01 LONG TERM CURRENT USE OF ANTICOAGULANT THERAPY: ICD-10-CM

## 2024-06-14 DIAGNOSIS — I26.99 PULMONARY EMBOLISM (H): Primary | ICD-10-CM

## 2024-06-14 DIAGNOSIS — I26.99 PULMONARY EMBOLISM WITHOUT ACUTE COR PULMONALE, UNSPECIFIED CHRONICITY, UNSPECIFIED PULMONARY EMBOLISM TYPE (H): ICD-10-CM

## 2024-06-14 LAB — INR BLD: 2.1 (ref 0.9–1.1)

## 2024-06-14 PROCEDURE — 36416 COLLJ CAPILLARY BLOOD SPEC: CPT

## 2024-06-14 PROCEDURE — 85610 PROTHROMBIN TIME: CPT

## 2024-06-14 NOTE — PROGRESS NOTES
ANTICOAGULATION MANAGEMENT     Yusef Alvares 48 year old male is on warfarin with therapeutic INR result. (Goal INR 2.0-3.0)    Recent labs: (last 7 days)     06/14/24  0914   INR 2.1*       ASSESSMENT     Source(s): Chart Review and Patient/Caregiver Call     Warfarin doses taken: Warfarin taken as instructed  Diet: No new diet changes identified  Medication/supplement changes: None noted  New illness, injury, or hospitalization: No  Signs or symptoms of bleeding or clotting: No  Previous result: Supratherapeutic  Additional findings: None       PLAN     Recommended plan for no diet, medication or health factor changes affecting INR     Dosing Instructions: Continue your current warfarin dose with next INR in 3 weeks       Summary  As of 6/14/2024      Full warfarin instructions:  7.5 mg every Sun, Tue, Thu; 5 mg all other days   Next INR check:  7/5/2024               Telephone call with Kosta who verbalizes understanding and agrees to plan    Lab visit scheduled    Education provided:   Goal range and lab monitoring: goal range and significance of current result    Plan made per [facility] anticoagulation protocol    Juliet Dwyer RN  Anticoagulation Clinic  6/14/2024    _______________________________________________________________________     Anticoagulation Episode Summary       Current INR goal:  2.0-3.0   TTR:  84.9% (1 y)   Target end date:  Indefinite   Send INR reminders to:  [facility]    Indications    Pulmonary embolism (H) [I26.99]  Long term current use of anticoagulant therapy [Z79.01]  Pulmonary embolism without acute cor pulmonale  unspecified chronicity  unspecified pulmonary embolism type (H) [I26.99]  Pulmonary embolism  unspecified chronicity  unspecified pulmonary embolism type  unspecified whether acute cor pulmonale present (H) [I26.99]             Comments:  Hx PE. low protein C levels.             Anticoagulation Care Providers       Provider Role Specialty Phone number    [place]  Yusef MARSHALL MD Referring Family Medicine [phone]    Renata Bangura DO Referring Internal Medicine [phone]

## 2024-07-05 ENCOUNTER — TELEPHONE (OUTPATIENT)
Dept: FAMILY MEDICINE | Facility: CLINIC | Age: 49
End: 2024-07-05

## 2024-07-05 ENCOUNTER — LAB (OUTPATIENT)
Dept: LAB | Facility: CLINIC | Age: 49
End: 2024-07-05
Payer: COMMERCIAL

## 2024-07-05 ENCOUNTER — ANTICOAGULATION THERAPY VISIT (OUTPATIENT)
Dept: ANTICOAGULATION | Facility: CLINIC | Age: 49
End: 2024-07-05

## 2024-07-05 DIAGNOSIS — I26.99 PULMONARY EMBOLISM WITHOUT ACUTE COR PULMONALE, UNSPECIFIED CHRONICITY, UNSPECIFIED PULMONARY EMBOLISM TYPE (H): ICD-10-CM

## 2024-07-05 DIAGNOSIS — I26.99 PULMONARY EMBOLISM, UNSPECIFIED CHRONICITY, UNSPECIFIED PULMONARY EMBOLISM TYPE, UNSPECIFIED WHETHER ACUTE COR PULMONALE PRESENT (H): ICD-10-CM

## 2024-07-05 DIAGNOSIS — I26.99 PULMONARY EMBOLISM (H): Primary | ICD-10-CM

## 2024-07-05 DIAGNOSIS — Z79.01 LONG TERM CURRENT USE OF ANTICOAGULANT THERAPY: ICD-10-CM

## 2024-07-05 DIAGNOSIS — Z79.01 LONG TERM CURRENT USE OF ANTICOAGULANT THERAPY: Primary | ICD-10-CM

## 2024-07-05 LAB — INR BLD: 3.1 (ref 0.9–1.1)

## 2024-07-05 PROCEDURE — 85610 PROTHROMBIN TIME: CPT

## 2024-07-05 PROCEDURE — 36416 COLLJ CAPILLARY BLOOD SPEC: CPT

## 2024-07-05 NOTE — TELEPHONE ENCOUNTER
ANTICOAGULATION CLINIC REFERRAL RENEWAL REQUEST       An annual renewal order is required for all patients referred to [facility].?  Please review and sign the pended referral order for Yusef Alvares.       ANTICOAGULATION SUMMARY      Warfarin indication(s)   PE    Mechanical heart valve present?  NO       Current goal range   INR: 2.0-3.0     Goal appropriate for indication? Goal INR 2-3, standard for indication(s) above     Time in Therapeutic Range (TTR)  (Goal > 60%) 84.9%       Office visit with referring provider's group within last year yes on 4/3/24       Juliet Dwyer RN  [facility]

## 2024-07-05 NOTE — PROGRESS NOTES
ANTICOAGULATION MANAGEMENT     Yusef Alvares 48 year old male is on warfarin with supratherapeutic INR result. (Goal INR 2.0-3.0)    Recent labs: (last 7 days)     07/05/24  0928   INR 3.1*       ASSESSMENT     Source(s): Chart Review and Patient/Caregiver Call     Warfarin doses taken: Warfarin taken as instructed  Diet: No new diet changes identified  Medication/supplement changes: None noted  New illness, injury, or hospitalization: No  Signs or symptoms of bleeding or clotting: No  Previous result: Therapeutic last visit; previously outside of goal range  Additional findings: None       PLAN     Recommended plan for no diet, medication or health factor changes affecting INR     Dosing Instructions: Continue your current warfarin dose with next INR in 2 weeks       Summary  As of 7/5/2024      Full warfarin instructions:  7.5 mg every Sun, Tue, Thu; 5 mg all other days   Next INR check:  7/19/2024               Telephone call with Kosta who verbalizes understanding and agrees to plan    Lab visit scheduled    Education provided: Goal range and lab monitoring: goal range and significance of current result    Plan made per [facility] anticoagulation protocol    Juliet Dwyer RN  Anticoagulation Clinic  7/5/2024    _______________________________________________________________________     Anticoagulation Episode Summary       Current INR goal:  2.0-3.0   TTR:  84.3% (1 y)   Target end date:  Indefinite   Send INR reminders to:  [facility]    Indications    Pulmonary embolism (H) [I26.99]  Long term current use of anticoagulant therapy [Z79.01]  Pulmonary embolism without acute cor pulmonale  unspecified chronicity  unspecified pulmonary embolism type (H) [I26.99]  Pulmonary embolism  unspecified chronicity  unspecified pulmonary embolism type  unspecified whether acute cor pulmonale present (H) [I26.99]             Comments:  Hx PE. low protein C levels.             Anticoagulation Care Providers       Provider  Role Specialty Phone number    Yusef Katz MD Referring Family Medicine [phone]    Renata Bangura DO Referring Internal Medicine [phone]

## 2024-07-19 ENCOUNTER — ANTICOAGULATION THERAPY VISIT (OUTPATIENT)
Dept: ANTICOAGULATION | Facility: CLINIC | Age: 49
End: 2024-07-19

## 2024-07-19 ENCOUNTER — LAB (OUTPATIENT)
Dept: LAB | Facility: CLINIC | Age: 49
End: 2024-07-19
Payer: COMMERCIAL

## 2024-07-19 DIAGNOSIS — Z79.01 LONG TERM CURRENT USE OF ANTICOAGULANT THERAPY: ICD-10-CM

## 2024-07-19 DIAGNOSIS — I26.99 PULMONARY EMBOLISM WITHOUT ACUTE COR PULMONALE, UNSPECIFIED CHRONICITY, UNSPECIFIED PULMONARY EMBOLISM TYPE (H): ICD-10-CM

## 2024-07-19 DIAGNOSIS — I26.99 PULMONARY EMBOLISM, UNSPECIFIED CHRONICITY, UNSPECIFIED PULMONARY EMBOLISM TYPE, UNSPECIFIED WHETHER ACUTE COR PULMONALE PRESENT (H): ICD-10-CM

## 2024-07-19 DIAGNOSIS — I26.99 PULMONARY EMBOLISM (H): Primary | ICD-10-CM

## 2024-07-19 LAB — INR BLD: 2.6 (ref 0.9–1.1)

## 2024-07-19 PROCEDURE — 36416 COLLJ CAPILLARY BLOOD SPEC: CPT

## 2024-07-19 PROCEDURE — 85610 PROTHROMBIN TIME: CPT

## 2024-07-19 NOTE — PROGRESS NOTES
ANTICOAGULATION MANAGEMENT     Yusef Alvares 48 year old male is on warfarin with therapeutic INR result. (Goal INR 2.0-3.0)    Recent labs: (last 7 days)     07/19/24  0925   INR 2.6*       ASSESSMENT     Warfarin Lab Questionnaire    Warfarin Doses Last 7 Days      7/18/2024     9:57 AM   Dose in Tablet or Mg   TAB or MG? milligram (mg)     Pt Rptd Dose SUNDAY MONDAY TUESDAY WED THURS FRIDAY SATURDAY 7/18/2024   9:57 AM 7.5 5 7.5 5 7.5 5 5         7/18/2024   Warfarin Lab Questionnaire   Missed doses within past 14 days? No   Changes in diet or alcohol within past 14 days? No   Medication changes since last result? No   Injuries or illness since last result? No   New shortness of breath, severe headaches or sudden changes in vision since last result? No   Abnormal bleeding since last result? No   Upcoming surgery, procedure? No   Best number to call with results? [phone]        Previous result: Supratherapeutic  Additional findings: None       PLAN     Recommended plan for no diet, medication or health factor changes affecting INR     Dosing Instructions: Continue your current warfarin dose with next INR in 3-4 weeks       Summary  As of 7/19/2024      Full warfarin instructions:  7.5 mg every Sun, Tue, Thu; 5 mg all other days   Next INR check:  8/9/2024               Telephone call with Kosta who verbalizes understanding and agrees to plan    Lab visit scheduled    Education provided: Please call back if any changes to your diet, medications or how you've been taking warfarin  Contact [phone] with any changes, questions or concerns.     Plan made per [facility] anticoagulation protocol    Suzie Fish, RN  Anticoagulation Clinic  7/19/2024    _______________________________________________________________________     Anticoagulation Episode Summary       Current INR goal:  2.0-3.0   TTR:  83.5% (1 y)   Target end date:  Indefinite   Send INR reminders to:  [facility]    Pulmonary  embolism (H) [I26.99]  Long term current use of anticoagulant therapy [Z79.01]  Pulmonary embolism without acute cor pulmonale  unspecified chronicity  unspecified pulmonary embolism type (H) [I26.99]  Pulmonary embolism  unspecified chronicity  unspecified pulmonary embolism type  unspecified whether acute cor pulmonale present (H) [I26.99]             Comments:  Hx PE. low protein C levels.             Anticoagulation Care Providers       Provider Role Specialty Phone number    Yusef Katz MD Referring Family Medicine [phone]    Renata Bangura DO Referring Internal Medicine [phone]    Brooks Uriarte MD Referring Family Medicine [phone]

## 2024-07-30 DIAGNOSIS — E78.5 HYPERLIPIDEMIA LDL GOAL <100: ICD-10-CM

## 2024-07-30 DIAGNOSIS — E03.9 HYPOTHYROIDISM, UNSPECIFIED TYPE: ICD-10-CM

## 2024-07-31 RX ORDER — SIMVASTATIN 40 MG
TABLET ORAL
Qty: 30 TABLET | Refills: 5 | Status: SHIPPED | OUTPATIENT
Start: 2024-07-31

## 2024-07-31 RX ORDER — SIMVASTATIN 20 MG
TABLET ORAL
Qty: 30 TABLET | Refills: 5 | Status: SHIPPED | OUTPATIENT
Start: 2024-07-31

## 2024-07-31 RX ORDER — LEVOTHYROXINE SODIUM 50 UG/1
50 TABLET ORAL DAILY
Qty: 30 TABLET | Refills: 5 | Status: SHIPPED | OUTPATIENT
Start: 2024-07-31

## 2024-08-10 ENCOUNTER — TRANSFERRED RECORDS (OUTPATIENT)
Dept: HEALTH INFORMATION MANAGEMENT | Facility: CLINIC | Age: 49
End: 2024-08-10
Payer: COMMERCIAL

## 2024-08-11 ENCOUNTER — MYC MEDICAL ADVICE (OUTPATIENT)
Dept: FAMILY MEDICINE | Facility: CLINIC | Age: 49
End: 2024-08-11
Payer: COMMERCIAL

## 2024-08-12 NOTE — TELEPHONE ENCOUNTER
MyChart reply sent to patient and closing encounter.      Courtney PEREZ RN  [facility]

## 2024-08-15 DIAGNOSIS — Z79.01 LONG TERM CURRENT USE OF ANTICOAGULANT THERAPY: ICD-10-CM

## 2024-08-15 DIAGNOSIS — I26.99 ACUTE PULMONARY EMBOLISM WITHOUT ACUTE COR PULMONALE, UNSPECIFIED PULMONARY EMBOLISM TYPE (H): ICD-10-CM

## 2024-08-15 DIAGNOSIS — I26.99 PULMONARY EMBOLISM WITHOUT ACUTE COR PULMONALE, UNSPECIFIED CHRONICITY, UNSPECIFIED PULMONARY EMBOLISM TYPE (H): ICD-10-CM

## 2024-08-15 DIAGNOSIS — I26.99 PULMONARY EMBOLISM (H): ICD-10-CM

## 2024-08-15 RX ORDER — WARFARIN SODIUM 5 MG/1
TABLET ORAL
Qty: 102 TABLET | Refills: 1 | Status: SHIPPED | OUTPATIENT
Start: 2024-08-15

## 2024-08-15 NOTE — TELEPHONE ENCOUNTER
ANTICOAGULATION MANAGEMENT:  Medication Refill    Anticoagulation Summary  As of 7/19/2024      Warfarin maintenance plan:  7.5 mg (5 mg x 1.5) every Sun, Tue, Thu; 5 mg (5 mg x 1) all other days   Next INR check:  8/16/2024   Target end date:  Indefinite    Indications    Pulmonary embolism (H) [I26.99]  Long term current use of anticoagulant therapy [Z79.01]  Pulmonary embolism without acute cor pulmonale  unspecified chronicity  unspecified pulmonary embolism type (H) [I26.99]  Pulmonary embolism  unspecified chronicity  unspecified pulmonary embolism type  unspecified whether acute cor pulmonale present (H) [I26.99]                 Anticoagulation Care Providers       Provider Role Specialty Phone number    Yusef Katz MD Referring Family Medicine     Renata Bangura DO Referring Internal Medicine [phone]    Brooks Uriarte MD Referring Family Medicine [phone]            Refill Criteria    Visit with referring provider/group: Meets criteria: visit within referring provider group in the last 15 months on 4/3/24    [facility] referral last signed: 07/05/2024; within last year: Yes    Lab monitoring not exceeding 2 weeks overdue: Yes    Yusef meets all criteria for refill. Rx instructions and quantity supplied updated to match patient's current dosing plan. Warfarin 90 day supply with 1 refill granted per [facility] protocol     Mary Desai RN  Anticoagulation Clinic

## 2024-08-16 ENCOUNTER — ANTICOAGULATION THERAPY VISIT (OUTPATIENT)
Dept: ANTICOAGULATION | Facility: CLINIC | Age: 49
End: 2024-08-16

## 2024-08-16 ENCOUNTER — LAB (OUTPATIENT)
Dept: LAB | Facility: CLINIC | Age: 49
End: 2024-08-16
Payer: COMMERCIAL

## 2024-08-16 DIAGNOSIS — I26.99 PULMONARY EMBOLISM, UNSPECIFIED CHRONICITY, UNSPECIFIED PULMONARY EMBOLISM TYPE, UNSPECIFIED WHETHER ACUTE COR PULMONALE PRESENT (H): ICD-10-CM

## 2024-08-16 DIAGNOSIS — Z79.01 LONG TERM CURRENT USE OF ANTICOAGULANT THERAPY: ICD-10-CM

## 2024-08-16 DIAGNOSIS — I26.99 PULMONARY EMBOLISM WITHOUT ACUTE COR PULMONALE, UNSPECIFIED CHRONICITY, UNSPECIFIED PULMONARY EMBOLISM TYPE (H): ICD-10-CM

## 2024-08-16 DIAGNOSIS — I26.99 PULMONARY EMBOLISM (H): Primary | ICD-10-CM

## 2024-08-16 LAB — INR BLD: 2.1 (ref 0.9–1.1)

## 2024-08-16 PROCEDURE — 85610 PROTHROMBIN TIME: CPT

## 2024-08-16 PROCEDURE — 36416 COLLJ CAPILLARY BLOOD SPEC: CPT

## 2024-08-16 NOTE — PROGRESS NOTES
ANTICOAGULATION MANAGEMENT     Yusef Alvares 48 year old male is on warfarin with therapeutic INR result. (Goal INR 2.0-3.0)    Recent labs: (last 7 days)     08/16/24  0923   INR 2.1*       ASSESSMENT     Warfarin Lab Questionnaire    Warfarin Doses Last 7 Days      8/15/2024    10:29 AM   Dose in Tablet or Mg   TAB or MG? milligram (mg)     Pt Rptd Dose ALEC MONDAY TUESDAY WED THURS FRIDAY SATURDAY   8/15/2024  10:29 AM 7.5 5 7.5 5 7.5 5 5         8/15/2024   Warfarin Lab Questionnaire   Missed doses within past 14 days? No   Changes in diet or alcohol within past 14 days? No   Medication changes since last result? No   Injuries or illness since last result? No   New shortness of breath, severe headaches or sudden changes in vision since last result? No   Abnormal bleeding since last result? No   Upcoming surgery, procedure? No   Best number to call with results? [phone]        Previous result: Therapeutic last visit; previously outside of goal range  Additional findings: None       PLAN     Recommended plan for no diet, medication or health factor changes affecting INR     Dosing Instructions: Continue your current warfarin dose with next INR in 5 weeks       Summary  As of 8/16/2024      Full warfarin instructions:  7.5 mg every Sun, Tue, Thu; 5 mg all other days   Next INR check:  9/20/2024               Telephone call with Kosta who verbalizes understanding and agrees to plan    Lab visit scheduled    Education provided: Goal range and lab monitoring: goal range and significance of current result    Plan made per [facility] anticoagulation protocol    Juliet Dwyer RN  Anticoagulation Clinic  8/16/2024    _______________________________________________________________________     Anticoagulation Episode Summary       Current INR goal:  2.0-3.0   TTR:  83.5% (1 y)   Target end date:  Indefinite   Send INR reminders to:  [facility]    Indications    Pulmonary embolism (H) [I26.99]  Long term current use  of anticoagulant therapy [Z79.01]  Pulmonary embolism without acute cor pulmonale  unspecified chronicity  unspecified pulmonary embolism type (H) [I26.99]  Pulmonary embolism  unspecified chronicity  unspecified pulmonary embolism type  unspecified whether acute cor pulmonale present (H) [I26.99]             Comments:  Hx PE. low protein C levels.             Anticoagulation Care Providers       Provider Role Specialty Phone number    Yusef Katz MD Referring Family Medicine     Renata Bangura DO Referring Internal Medicine [phone]    Brooks Uriarte MD Referring Family Medicine [phone]

## 2024-09-14 ENCOUNTER — HEALTH MAINTENANCE LETTER (OUTPATIENT)
Age: 49
End: 2024-09-14

## 2024-09-20 ENCOUNTER — ANTICOAGULATION THERAPY VISIT (OUTPATIENT)
Dept: ANTICOAGULATION | Facility: CLINIC | Age: 49
End: 2024-09-20

## 2024-09-20 ENCOUNTER — LAB (OUTPATIENT)
Dept: LAB | Facility: CLINIC | Age: 49
End: 2024-09-20
Payer: COMMERCIAL

## 2024-09-20 DIAGNOSIS — I26.99 PULMONARY EMBOLISM (H): Primary | ICD-10-CM

## 2024-09-20 DIAGNOSIS — Z79.01 LONG TERM CURRENT USE OF ANTICOAGULANT THERAPY: ICD-10-CM

## 2024-09-20 DIAGNOSIS — I26.99 PULMONARY EMBOLISM WITHOUT ACUTE COR PULMONALE, UNSPECIFIED CHRONICITY, UNSPECIFIED PULMONARY EMBOLISM TYPE (H): ICD-10-CM

## 2024-09-20 DIAGNOSIS — I26.99 PULMONARY EMBOLISM, UNSPECIFIED CHRONICITY, UNSPECIFIED PULMONARY EMBOLISM TYPE, UNSPECIFIED WHETHER ACUTE COR PULMONALE PRESENT (H): ICD-10-CM

## 2024-09-20 LAB — INR BLD: 2.1 (ref 0.9–1.1)

## 2024-09-20 PROCEDURE — 36416 COLLJ CAPILLARY BLOOD SPEC: CPT

## 2024-09-20 PROCEDURE — 85610 PROTHROMBIN TIME: CPT

## 2024-09-20 NOTE — PROGRESS NOTES
ANTICOAGULATION MANAGEMENT     Yusef Alvares 48 year old male is on warfarin with therapeutic INR result. (Goal INR 2.0-3.0)    Recent labs: (last 7 days)     09/20/24  0922   INR 2.1*       ASSESSMENT     Warfarin Lab Questionnaire    Warfarin Doses Last 7 Days    Pt Rptd Dose SUNDAY MONDAY TUESDAY WED THURS FRIDAY SATURDAY 9/19/2024  10:12 AM 7.5 5 7.5 5 7.5 5 5         9/19/2024   Warfarin Lab Questionnaire   Missed doses within past 14 days? No   Changes in diet or alcohol within past 14 days? No   Medication changes since last result? No   Injuries or illness since last result? No   New shortness of breath, severe headaches or sudden changes in vision since last result? No   Abnormal bleeding since last result? No   Upcoming surgery, procedure? No   Best number to call with results? [phone]        Previous result: Therapeutic last 2(+) visits  Additional findings: None       PLAN     Recommended plan for no diet, medication or health factor changes affecting INR     Dosing Instructions: Continue your current warfarin dose with next INR in 6 weeks       Summary  As of 9/20/2024      Full warfarin instructions:  7.5 mg every Sun, Tue, Thu; 5 mg all other days   Next INR check:  11/1/2024               Telephone call with Kosta who verbalizes understanding and agrees to plan    Lab visit scheduled    Education provided: Goal range and lab monitoring: goal range and significance of current result    Plan made per [facility] anticoagulation protocol    Juliet Dwyer, RN  9/20/2024  Anticoagulation Clinic  [employer] for routing messages: susie [facility]  [facility] patient phone line: [phone]        _______________________________________________________________________     Anticoagulation Episode Summary       Current INR goal:  2.0-3.0   TTR:  90.2% (1 y)   Target end date:  Indefinite   Send INR reminders to:  [facility]    Indications    Pulmonary embolism (H) [I26.99]  Long term current use of  anticoagulant therapy [Z79.01]  Pulmonary embolism without acute cor pulmonale  unspecified chronicity  unspecified pulmonary embolism type (H) [I26.99]  Pulmonary embolism  unspecified chronicity  unspecified pulmonary embolism type  unspecified whether acute cor pulmonale present (H) [I26.99]             Comments:  Hx PE. low protein C levels.             Anticoagulation Care Providers       Provider Role Specialty Phone number    Yusef Katz MD Referring Family Medicine     Renata Bangura DO Referring Internal Medicine [phone]    Brooks Uriarte MD Referring Family Medicine [phone]

## 2024-09-27 ENCOUNTER — PATIENT OUTREACH (OUTPATIENT)
Dept: ONCOLOGY | Facility: CLINIC | Age: 49
End: 2024-09-27
Payer: COMMERCIAL

## 2024-09-27 NOTE — PROGRESS NOTES
Pt last saw heme in 2020. Removed RNDEIRDRE Hernandes from pt care team.    TONYA WAY RN on 9/27/2024 at 11:47 AM

## 2024-11-01 ENCOUNTER — ANTICOAGULATION THERAPY VISIT (OUTPATIENT)
Dept: ANTICOAGULATION | Facility: CLINIC | Age: 49
End: 2024-11-01

## 2024-11-01 ENCOUNTER — LAB (OUTPATIENT)
Dept: LAB | Facility: CLINIC | Age: 49
End: 2024-11-01
Payer: COMMERCIAL

## 2024-11-01 DIAGNOSIS — I26.99 PULMONARY EMBOLISM WITHOUT ACUTE COR PULMONALE, UNSPECIFIED CHRONICITY, UNSPECIFIED PULMONARY EMBOLISM TYPE (H): ICD-10-CM

## 2024-11-01 DIAGNOSIS — I26.99 PULMONARY EMBOLISM (H): Primary | ICD-10-CM

## 2024-11-01 DIAGNOSIS — Z79.01 LONG TERM CURRENT USE OF ANTICOAGULANT THERAPY: ICD-10-CM

## 2024-11-01 DIAGNOSIS — I26.99 PULMONARY EMBOLISM, UNSPECIFIED CHRONICITY, UNSPECIFIED PULMONARY EMBOLISM TYPE, UNSPECIFIED WHETHER ACUTE COR PULMONALE PRESENT (H): ICD-10-CM

## 2024-11-01 LAB — INR BLD: 2.7 (ref 0.9–1.1)

## 2024-11-01 PROCEDURE — 85610 PROTHROMBIN TIME: CPT

## 2024-11-01 PROCEDURE — 36416 COLLJ CAPILLARY BLOOD SPEC: CPT

## 2024-11-01 NOTE — PROGRESS NOTES
ANTICOAGULATION MANAGEMENT     Yusef Alvares 49 year old male is on warfarin with therapeutic INR result. (Goal INR 2.0-3.0)    Recent labs: (last 7 days)     11/01/24  0933   INR 2.7*       ASSESSMENT     Warfarin Lab Questionnaire    Warfarin Doses Last 7 Days      10/31/2024    10:22 AM   Dose in Tablet or Mg   TAB or MG? milligram (mg)     Pt Rptd Dose ALEC MONDAY TUESDAY WED THURS FRIDAY SATURDAY   10/31/2024  10:22 AM 7.5 5 7.5 5 7.5 5 5         10/31/2024   Warfarin Lab Questionnaire   Missed doses within past 14 days? No   Changes in diet or alcohol within past 14 days? No   Medication changes since last result? No   Injuries or illness since last result? No   New shortness of breath, severe headaches or sudden changes in vision since last result? No   Abnormal bleeding since last result? No   Upcoming surgery, procedure? No   Best number to call with results? [phone]        Previous result: Therapeutic last 2(+) visits  Additional findings: None       PLAN     Recommended plan for no diet, medication or health factor changes affecting INR     Dosing Instructions: Continue your current warfarin dose with next INR in 6 weeks       Summary  As of 11/1/2024      Full warfarin instructions:  7.5 mg every Sun, Tue, Thu; 5 mg all other days   Next INR check:  12/13/2024               Telephone call with Kosta who verbalizes understanding and agrees to plan    Lab visit scheduled    Education provided: Goal range and lab monitoring: goal range and significance of current result    Plan made per [facility] anticoagulation protocol    Juliet Dwyer RN  11/1/2024  Anticoagulation Clinic  [employer] for routing messages: susie [facility]ARE  [facility] patient phone line: [phone]        _______________________________________________________________________     Anticoagulation Episode Summary       Current INR goal:  2.0-3.0   TTR:  90.2% (1 y)   Target end date:  Indefinite   Send INR reminders to:  [facility]     Indications    Pulmonary embolism (H) [I26.99]  Long term current use of anticoagulant therapy [Z79.01]  Pulmonary embolism without acute cor pulmonale  unspecified chronicity  unspecified pulmonary embolism type (H) [I26.99]  Pulmonary embolism  unspecified chronicity  unspecified pulmonary embolism type  unspecified whether acute cor pulmonale present (H) [I26.99]             Comments:  Hx PE. low protein C levels.             Anticoagulation Care Providers       Provider Role Specialty Phone number    Yusef Katz MD Referring Family Medicine     Renata Bangura DO Referring Internal Medicine [phone]    Brooks Uriarte MD Referring Family Medicine [phone]

## 2024-11-02 SDOH — HEALTH STABILITY: PHYSICAL HEALTH: ON AVERAGE, HOW MANY DAYS PER WEEK DO YOU ENGAGE IN MODERATE TO STRENUOUS EXERCISE (LIKE A BRISK WALK)?: 2 DAYS

## 2024-11-02 SDOH — HEALTH STABILITY: PHYSICAL HEALTH: ON AVERAGE, HOW MANY MINUTES DO YOU ENGAGE IN EXERCISE AT THIS LEVEL?: 10 MIN

## 2024-11-02 ASSESSMENT — SOCIAL DETERMINANTS OF HEALTH (SDOH): HOW OFTEN DO YOU GET TOGETHER WITH FRIENDS OR RELATIVES?: MORE THAN THREE TIMES A WEEK

## 2024-11-07 ENCOUNTER — OFFICE VISIT (OUTPATIENT)
Dept: FAMILY MEDICINE | Facility: CLINIC | Age: 49
End: 2024-11-07
Payer: COMMERCIAL

## 2024-11-07 VITALS
WEIGHT: 207 LBS | SYSTOLIC BLOOD PRESSURE: 124 MMHG | OXYGEN SATURATION: 97 % | HEART RATE: 97 BPM | BODY MASS INDEX: 34.49 KG/M2 | RESPIRATION RATE: 24 BRPM | HEIGHT: 65 IN | TEMPERATURE: 98.5 F | DIASTOLIC BLOOD PRESSURE: 80 MMHG

## 2024-11-07 DIAGNOSIS — E03.9 HYPOTHYROIDISM, UNSPECIFIED TYPE: ICD-10-CM

## 2024-11-07 DIAGNOSIS — Z23 NEED FOR SHINGLES VACCINE: ICD-10-CM

## 2024-11-07 DIAGNOSIS — I26.99 PULMONARY EMBOLISM WITHOUT ACUTE COR PULMONALE, UNSPECIFIED CHRONICITY, UNSPECIFIED PULMONARY EMBOLISM TYPE (H): ICD-10-CM

## 2024-11-07 DIAGNOSIS — F41.9 ANXIETY: ICD-10-CM

## 2024-11-07 DIAGNOSIS — Z12.5 SCREENING FOR PROSTATE CANCER: ICD-10-CM

## 2024-11-07 DIAGNOSIS — Z79.01 LONG TERM CURRENT USE OF ANTICOAGULANT THERAPY: ICD-10-CM

## 2024-11-07 DIAGNOSIS — R73.03 PREDIABETES: ICD-10-CM

## 2024-11-07 DIAGNOSIS — E78.5 HYPERLIPIDEMIA LDL GOAL <100: ICD-10-CM

## 2024-11-07 DIAGNOSIS — Z00.00 ENCOUNTER FOR MEDICARE ANNUAL WELLNESS EXAM: Primary | ICD-10-CM

## 2024-11-07 PROCEDURE — 90673 RIV3 VACCINE NO PRESERV IM: CPT | Performed by: FAMILY MEDICINE

## 2024-11-07 PROCEDURE — G0008 ADMIN INFLUENZA VIRUS VAC: HCPCS | Performed by: FAMILY MEDICINE

## 2024-11-07 PROCEDURE — G0439 PPPS, SUBSEQ VISIT: HCPCS | Performed by: FAMILY MEDICINE

## 2024-11-07 PROCEDURE — 99214 OFFICE O/P EST MOD 30 MIN: CPT | Mod: 25 | Performed by: FAMILY MEDICINE

## 2024-11-07 RX ORDER — SIMVASTATIN 40 MG
TABLET ORAL
Qty: 90 TABLET | Refills: 3 | Status: SHIPPED | OUTPATIENT
Start: 2024-11-07

## 2024-11-07 RX ORDER — SIMVASTATIN 20 MG
TABLET ORAL
Qty: 30 TABLET | Refills: 5 | Status: CANCELLED | OUTPATIENT
Start: 2024-11-07

## 2024-11-07 RX ORDER — WARFARIN SODIUM 5 MG/1
TABLET ORAL
Qty: 102 TABLET | Refills: 1 | Status: SHIPPED | OUTPATIENT
Start: 2024-11-07

## 2024-11-07 RX ORDER — LEVOTHYROXINE SODIUM 50 UG/1
50 TABLET ORAL DAILY
Qty: 90 TABLET | Refills: 3 | Status: SHIPPED | OUTPATIENT
Start: 2024-11-07

## 2024-11-07 ASSESSMENT — ENCOUNTER SYMPTOMS: NERVOUS/ANXIOUS: 1

## 2024-11-07 ASSESSMENT — PAIN SCALES - GENERAL: PAINLEVEL_OUTOF10: NO PAIN (0)

## 2024-11-07 NOTE — PROGRESS NOTES
"Preventive Care Visit  [facility]  Brooks Uriarte MD, Family Medicine  Nov 7, 2024      Assessment & Plan     Encounter for Medicare annual wellness exam  Patient was advised on recommended screening and preventive health recommendations.  He verbalized understanding and agreed to the plans below.     Pulmonary embolism without acute cor pulmonale, unspecified chronicity, unspecified pulmonary embolism type (H)  No acute symptoms now.  Continue AC and follow up with [facility].  - warfarin ANTICOAGULANT (COUMADIN) 5 MG tablet  Dispense: 102 tablet; Refill: 1  - OFFICE/OUTPT VISIT,EST,LEVL IV    Long term current use of anticoagulant therapy  - warfarin ANTICOAGULANT (COUMADIN) 5 MG tablet  Dispense: 102 tablet; Refill: 1  - OFFICE/OUTPT VISIT,EST,LEVL IV    Hyperlipidemia LDL goal <100  Reinforced heart healthy lifestyle.   - simvastatin (ZOCOR) 40 MG tablet  Dispense: 90 tablet; Refill: 3  - Comprehensive metabolic panel  - Lipid panel reflex to direct LDL Fasting  - OFFICE/OUTPT VISIT,EST,LEVL IV    Hypothyroidism, unspecified type  Asymptomatic.  - levothyroxine (SYNTHROID/LEVOTHROID) 50 MCG tablet  Dispense: 90 tablet; Refill: 3  - OFFICE/OUTPT VISIT,EST,LEVL IV    Need for shingles vaccine  Patient will obtain from pharmacy due to coverage.     Prediabetes  - Comprehensive metabolic panel  - Hemoglobin A1c    Screening for prostate cancer  - Prostate Specific Antigen Screen    Anxiety  Due to recent/current events.  Continue citalopram managed by psychiatry.  Advised regular exercise, mindfulness, reducing social media/news exposure/time, reading books he likes, engaging in a hobby.  - OFFICE/OUTPT VISIT,EST,LEVL IV      Patient has been advised of split billing requirements and indicates understanding: Yes        BMI  Estimated body mass index is 34.98 kg/m  as calculated from the following:    Height as of this encounter: 1.638 m (5' 4.5\").    Weight as of this encounter: 93.9 kg " (207 lb).   Weight management plan: Discussed healthy diet and exercise guidelines    Counseling  Appropriate preventive services were addressed with this patient via screening, questionnaire, or discussion as appropriate for fall prevention, nutrition, physical activity, Tobacco-use cessation, social engagement, weight loss and cognition.  Checklist reviewing preventive services available has been given to the patient.  Reviewed patient's diet, addressing concerns and/or questions.   He is at risk for lack of exercise and has been provided with information to increase physical activity for the benefit of his well-being.       Patient Instructions   Patient Education    Get covid19 vaccine updated ASAP. You deferred this today.     Get shingles vaccine from the pharmacy soon.    Be consistent with low salt, low trans fat and low saturated fat diet.  Eat food rich in omega-3-fatty acids as you tolerate. (salmon, olive oil)  Eat 5 cups of vegetables, fruits and whole grains per day.  Limit starchy food (white rice, white bread, white pasta, white potatoes) to less than a cup per meal.  Minimize sweets, junk food and fastfood. Limit soda beverages to one serving per day; best to avoid it altogether though.    Exercise: moderate intensity sustained for at least 30 mins per episode, goal of 150 mins per week at least  Combine cardiovascular and resistance exercises.  These exercise recommendations are in addition to your daily activity at work or home.  Work on losing weight.    Measure resting blood pressure at home at least once a day, and as needed if you have dizziness or other symptoms.  You may log measurements in a small notebook.    Goal blood pressure is to be below 140/90    Contact the care team if your blood pressures are frequently out of range or if you have any concerning symptoms.     Preventative Care Visits include: Yearly physicals, Well-child visits, Welcome to Medicare visits, & Medicare yearly  wellness exams.    The purpose of these visits is to discuss your medical history and prevent health problems before you are sick.  You may need to pay a copay, coinsurance or deductible if your visit today includes testing or treating for a new or existing condition.    Additional charges may be incurred for today's visit. If you have questions about what your insurance plan covers, please contact your Insurance Company's member service department.  If you have questions specific to a bill you have already received from [employer], please contact the [employer] Billing Office at [phone].    Preventive Care Advice   This is general advice given by our system to help you stay healthy. However, your care team may have specific advice just for you. Please talk to your care team about your preventive care needs.  Nutrition  Eat 5 or more servings of fruits and vegetables each day.  Try wheat bread, brown rice and whole grain pasta (instead of white bread, rice, and pasta).  Get enough calcium and vitamin D. Check the label on foods and aim for 100% of the RDA (recommended daily allowance).  Lifestyle  Exercise at least 150 minutes each week  (30 minutes a day, 5 days a week).  Do muscle strengthening activities 2 days a week. These help control your weight and prevent disease.  No smoking.  Wear sunscreen to prevent skin cancer.  Have a dental exam and cleaning every 6 months.  Yearly exams  See your health care team every year to talk about:  Any changes in your health.  Any medicines your care team has prescribed.  Preventive care, family planning, and ways to prevent chronic diseases.  Shots (vaccines)   HPV shots (up to age 26), if you've never had them before.  Hepatitis B shots (up to age 59), if you've never had them before.  COVID-19 shot: Get this shot when it's due.  Flu shot: Get a flu shot every year.  Tetanus shot: Get a tetanus shot every 10 years.  Pneumococcal, hepatitis A, and RSV shots: Ask your care  team if you need these based on your risk.  Shingles shot (for age 50 and up)  General health tests  Diabetes screening:  Starting at age 35, Get screened for diabetes at least every 3 years.  If you are younger than age 35, ask your care team if you should be screened for diabetes.  Cholesterol test: At age 39, start having a cholesterol test every 5 years, or more often if advised.  Bone density scan (DEXA): At age 50, ask your care team if you should have this scan for osteoporosis (brittle bones).  Hepatitis C: Get tested at least once in your life.  STIs (sexually transmitted infections)  Before age 24: Ask your care team if you should be screened for STIs.  After age 24: Get screened for STIs if you're at risk. You are at risk for STIs (including HIV) if:  You are sexually active with more than one person.  You don't use condoms every time.  You or a partner was diagnosed with a sexually transmitted infection.  If you are at risk for HIV, ask about PrEP medicine to prevent HIV.  Get tested for HIV at least once in your life, whether you are at risk for HIV or not.  Cancer screening tests  Cervical cancer screening: If you have a cervix, begin getting regular cervical cancer screening tests starting at age 21.  Breast cancer scan (mammogram): If you've ever had breasts, begin having regular mammograms starting at age 40. This is a scan to check for breast cancer.  Colon cancer screening: It is important to start screening for colon cancer at age 45.  Have a colonoscopy test every 10 years (or more often if you're at risk) Or, ask your provider about stool tests like a FIT test every year or Cologuard test every 3 years.  To learn more about your testing options, visit:   .  For help making a decision, visit:   https://bit.ly/mb70379.  Prostate cancer screening test: If you have a prostate, ask your care team if a prostate cancer screening test (PSA) at age 55 is right for you.  Lung cancer screening: If you are  a current or former smoker ages 50 to 80, ask your care team if ongoing lung cancer screenings are right for you.  For informational purposes only. Not to replace the advice of your health care provider. Copyright   2023 [employer]. All rights reserved. Clinically reviewed by the [facility] Transitions Program. [employer] [id number] - REV 01/24.       Jyoti Brambila is a 49 year old, presenting for the following:  Physical, Lipids, Thyroid Problem, Anxiety, and Depression        11/7/2024     1:12 PM   Additional Questions   Roomed by Elizabeth HALL MA   Accompanied by self         11/7/2024     1:12 PM   Patient Reported Additional Medications   Patient reports taking the following new medications none           Anxiety          Hyperlipidemia Follow-Up    Are you regularly taking any medication or supplement to lower your cholesterol?   Yes- simvastatin  Are you having muscle aches or other side effects that you think could be caused by your cholesterol lowering medication?  No    Hypothyroidism Follow-up    Since last visit, patient describes the following symptoms: Weight stable, no hair loss, no skin changes, no constipation, no loose stools    History of pulm embolism - denies new chest pain or dyspnea.  Patient is on warfarin      Health Care Directive  Patient does not have a Health Care Directive: Discussed advance care planning with patient; information given to patient to review.      11/2/2024   General Health   How would you rate your overall physical health? Good   Feel stress (tense, anxious, or unable to sleep) To some extent      (!) STRESS CONCERN      11/2/2024   Nutrition   Diet: Regular (no restrictions)            11/2/2024   Exercise   Days per week of moderate/strenous exercise 2 days   Average minutes spent exercising at this level 10 min      (!) EXERCISE CONCERN      11/2/2024   Social Factors   Frequency of gathering with friends or relatives More than three times a week    Worry food won't last until get money to buy more No   Food not last or not have enough money for food? No   Do you have housing? (Housing is defined as stable permanent housing and does not include staying ouside in a car, in a tent, in an abandoned building, in an overnight shelter, or couch-surfing.) Yes   Are you worried about losing your housing? No   Lack of transportation? No   Unable to get utilities (heat,electricity)? No            11/2/2024   Fall Risk   Fallen 2 or more times in the past year? No    Trouble with walking or balance? No        Patient-reported          11/2/2024   Activities of Daily Living- Home Safety   Needs help with the following daily activites None of the above   Safety concerns in the home None of the above            11/2/2024   Dental   Dentist two times every year? Yes            11/2/2024   Hearing Screening   Hearing concerns? None of the above            11/2/2024   Driving Risk Screening   Patient/family members have concerns about driving No            11/2/2024   General Alertness/Fatigue Screening   Have you been more tired than usual lately? No            11/2/2024   Urinary Incontinence Screening   Bothered by leaking urine in past 6 months No            11/2/2024   TB Screening   Were you born outside of the [place]? No              Today's PHQ-2 Score:       4/2/2024     2:41 PM   PHQ-2 ( 1999 [employer])   Q1: Little interest or pleasure in doing things 0    Q2: Feeling down, depressed or hopeless 0    PHQ-2 Score 0   Q1: Little interest or pleasure in doing things Not at all   Q2: Feeling down, depressed or hopeless Not at all   PHQ-2 Score 0       Patient-reported         11/2/2024   Substance Use   Alcohol more than 3/day or more than 7/wk Not Applicable   Do you have a current opioid prescription? No   How severe/bad is pain from 1 to 10? 5/10   Do you use any other substances recreationally? No        Social History     Tobacco Use    Smoking status: Never    Smokeless  tobacco: Never   Vaping Use    Vaping status: Never Used   Substance Use Topics    Alcohol use: No    Drug use: No       ASCVD Risk   The 10-year ASCVD risk score (Joey DK, et al., 2019) is: 5.2%    Values used to calculate the score:      Age: 49 years      Sex: Male      Is Non- : No      Diabetic: No      Tobacco smoker: No      Systolic Blood Pressure: 156 mmHg      Is BP treated: No      HDL Cholesterol: 46 mg/dL      Total Cholesterol: 198 mg/dL            11/2/2024   Contraception/Family Planning   Questions about contraception or family planning No            Reviewed and updated as needed this visit by Provider                    Past Medical History:   Diagnosis Date    Arthritis     Asperger's syndrome 01/01/2009    Bipolar affective disorder (H)     Depressive disorder     Hyperlipidemia     Mental health problem     ABDULKADIR (obstructive sleep apnea) 2008 / 2015    HST AHI 19.9, [place] 81%    Schizoaffective disorder (H)     Scoliosis 01/01/2004    Thyroid disease      Past Surgical History:   Procedure Laterality Date    COLONOSCOPY N/A 3/20/2023    Procedure: COLONOSCOPY;  Surgeon: Enid Sears MD;  Location: [facility]    DENTAL SURGERY      Extraction of abscess tooth    [facility]       Patient Active Problem List   Diagnosis    Schizophrenia (H)    Weight gain    Hyperlipidemia LDL goal <100    Acne scarring    Hypothyroidism    TB (tuberculosis) contact    ABDULKADIR (obstructive sleep apnea)    Schizoaffective disorder (H)    CARDIOVASCULAR SCREENING; LDL GOAL LESS THAN 130    Lesions of both ulnar nerves    Right-sided low back pain with right-sided sciatica    Obesity (BMI 30.0-34.9)    Pulmonary embolism (H)    Long term current use of anticoagulant therapy    Pulmonary embolism without acute cor pulmonale, unspecified chronicity, unspecified pulmonary embolism type (H)    Pulmonary embolism, unspecified chronicity, unspecified pulmonary embolism type, unspecified  whether acute cor pulmonale present (H)     Past Surgical History:   Procedure Laterality Date    COLONOSCOPY N/A 3/20/2023    Procedure: COLONOSCOPY;  Surgeon: Enid Sears MD;  Location: [facility]    DENTAL SURGERY      Extraction of abscess tooth    [facility]         Social History     Tobacco Use    Smoking status: Never    Smokeless tobacco: Never   Substance Use Topics    Alcohol use: No     Family History   Problem Relation Age of Onset    Psychotic Disorder Mother         pre-schizophrenia    Breast Cancer Mother     Respiratory Mother         copd.   Sleep apnea    Cardiovascular Mother         aneurysms x 2    Hypertension Mother     Hyperlipidemia Mother     Heart Disease Mother         CHF    Mental Illness Mother     Obesity Mother     Alcohol/Drug Father     Respiratory Father         copd    Asthma Father     Gastrointestinal Disease Father         appendectomy    Hypertension Father     Hyperlipidemia Father     Lymphoma Father     Other Cancer Father     Substance Abuse Father     Alcohol/Drug Maternal Grandmother         cirrhosis of liver    Depression Maternal Grandfather     Arthritis Maternal Grandfather         hip replacement    Alzheimer Disease Maternal Grandfather     Cancer Paternal Grandmother     Asthma Brother     Respiratory Brother         copd    Hernia Brother          Current Outpatient Medications   Medication Sig Dispense Refill    ARIPiprazole (ABILIFY) 20 MG tablet Take 20 mg by mouth daily      atomoxetine (STRATTERA) 80 MG capsule Take 80 mg by mouth daily      citalopram (CELEXA) 40 MG tablet Take 40 mg by mouth daily.      levothyroxine (SYNTHROID/LEVOTHROID) 50 MCG tablet TAKE 1 TABLET BY MOUTH DAILY 30 tablet 5    naltrexone (DEPADE;REVIA) 50 MG tablet Take 50 mg by mouth daily      risperiDONE (RISPERDAL) 0.25 MG tablet 0.25 mg as needed      simvastatin (ZOCOR) 20 MG tablet TAKE 1 TABLET BY MOUTH NIGHTLY 30 tablet 5    simvastatin (ZOCOR) 40 MG tablet TAKE 1  "TABLET BY MOUTH NIGHTLY 30 tablet 5    warfarin ANTICOAGULANT (COUMADIN) 5 MG tablet Take 7.5 mg Sunday, Tuesday, Thursday and 5 mg all other days or as directed by coumadin clinic 102 tablet 1     No Known Allergies  Current providers sharing in care for this patient include:  Patient Care Team:  Brooks Uriarte MD as PCP - General (Family Medicine)  Tahir Tran MD as Assigned Sleep Provider  Stephanie Ruiz MD as Assigned PCP    The following health maintenance items are reviewed in Epic and correct as of today:  Health Maintenance   Topic Date Due    MENTAL HEALTH TX PLAN  03/02/2016    MEDICARE ANNUAL WELLNESS VISIT  07/31/2024    ANNUAL REVIEW OF HM ORDERS  07/31/2024    INFLUENZA VACCINE (1) 09/01/2024    COVID-19 Vaccine (5 - 2024-25 season) 09/01/2024    ZOSTER IMMUNIZATION (1 of 2) 10/02/2024    LIPID  04/12/2025    TSH W/FREE T4 REFLEX  04/12/2025    COLORECTAL CANCER SCREENING  03/20/2026    GLUCOSE  04/12/2027    ADVANCE CARE PLANNING  07/18/2027    DTAP/TDAP/TD IMMUNIZATION (8 - Td or Tdap) 08/10/2032    RSV VACCINE (1 - 1-dose 75+ series) 10/02/2049    HIV SCREENING  Completed    PHQ-2 (once per calendar year)  Completed    HEPATITIS B IMMUNIZATION  Completed    Pneumococcal Vaccine: Pediatrics (0 to 5 Years) and At-Risk Patients (6 to 64 Years)  Aged Out    HPV IMMUNIZATION  Aged Out    MENINGITIS IMMUNIZATION  Aged Out    RSV MONOCLONAL ANTIBODY  Aged Out    HEPATITIS C SCREENING  Discontinued         Review of Systems  Constitutional, HEENT, cardiovascular, pulmonary, GI, , musculoskeletal, neuro, skin, endocrine and psych systems are negative, except as otherwise noted.     Objective    Exam  BP (!) 156/82 (BP Location: Right arm, Patient Position: Sitting, Cuff Size: Adult Regular)   Pulse 97   Temp 98.5  F (36.9  C) (Tympanic)   Resp 24   Ht 1.638 m (5' 4.5\")   Wt 93.9 kg (207 lb)   SpO2 97%   BMI 34.98 kg/m     Estimated body mass index is 34.98 kg/m  as calculated from the " "following:    Height as of this encounter: 1.638 m (5' 4.5\").    Weight as of this encounter: 93.9 kg (207 lb).    Physical Exam  GENERAL APPEARANCE:  alert and no distress  EYES: pink conj, no icterus, PERRL, EOMI  HENT: ear canals and TM's normal, nose and mouth without ulcers or lesions, oropharynx clear and oral mucous membranes moist  NECK: no adenopathy, no asymmetry, masses, or scars and thyroid normal to palpation  RESP: lungs clear to auscultation - no rales, rhonchi or wheezes  CV: regular rates and rhythm, normal S1 S2, no S3 or S4, no murmur, click or rub, no peripheral edema and peripheral pulses strong  ABDOMEN: soft, nontender, no hepatosplenomegaly, no masses and bowel sounds normal  RECTAL: normal sphincter tone, no rectal masses, prostate slightly enlarged but smooth, soft and nontender  MS: no musculoskeletal defects are noted and gait is age appropriate without ataxia  SKIN: no suspicious lesions or rashes  NEURO: Normal strength and tone, sensory exam grossly normal, mentation intact and speech normal          11/7/2024   Mini Cog   Clock Draw Score 2 Normal   3 Item Recall 3 objects recalled   Mini Cog Total Score 5                 Signed Electronically by: Brooks Uriarte MD    "

## 2024-11-07 NOTE — PATIENT INSTRUCTIONS
Patient Education     Get covid19 vaccine updated ASAP. You deferred this today.     Get shingles vaccine from the pharmacy soon.    Be consistent with low salt, low trans fat and low saturated fat diet.  Eat food rich in omega-3-fatty acids as you tolerate. (salmon, olive oil)  Eat 5 cups of vegetables, fruits and whole grains per day.  Limit starchy food (white rice, white bread, white pasta, white potatoes) to less than a cup per meal.  Minimize sweets, junk food and fastfood. Limit soda beverages to one serving per day; best to avoid it altogether though.    Exercise: moderate intensity sustained for at least 30 mins per episode, goal of 150 mins per week at least  Combine cardiovascular and resistance exercises.  These exercise recommendations are in addition to your daily activity at work or home.  Work on losing weight.    Measure resting blood pressure at home at least once a day, and as needed if you have dizziness or other symptoms.  You may log measurements in a small notebook.    Goal blood pressure is to be below 140/90    Contact the care team if your blood pressures are frequently out of range or if you have any concerning symptoms.     Preventative Care Visits include: Yearly physicals, Well-child visits, Welcome to Medicare visits, & Medicare yearly wellness exams.    The purpose of these visits is to discuss your medical history and prevent health problems before you are sick.  You may need to pay a copay, coinsurance or deductible if your visit today includes testing or treating for a new or existing condition.    Additional charges may be incurred for today's visit. If you have questions about what your insurance plan covers, please contact your Insurance Company's member service department.  If you have questions specific to a bill you have already received from [employer], please contact the [facility] Billing Office at [phone].    Preventive Care Advice   This is general advice given by our  system to help you stay healthy. However, your care team may have specific advice just for you. Please talk to your care team about your preventive care needs.  Nutrition  Eat 5 or more servings of fruits and vegetables each day.  Try wheat bread, brown rice and whole grain pasta (instead of white bread, rice, and pasta).  Get enough calcium and vitamin D. Check the label on foods and aim for 100% of the RDA (recommended daily allowance).  Lifestyle  Exercise at least 150 minutes each week  (30 minutes a day, 5 days a week).  Do muscle strengthening activities 2 days a week. These help control your weight and prevent disease.  No smoking.  Wear sunscreen to prevent skin cancer.  Have a dental exam and cleaning every 6 months.  Yearly exams  See your health care team every year to talk about:  Any changes in your health.  Any medicines your care team has prescribed.  Preventive care, family planning, and ways to prevent chronic diseases.  Shots (vaccines)   HPV shots (up to age 26), if you've never had them before.  Hepatitis B shots (up to age 59), if you've never had them before.  COVID-19 shot: Get this shot when it's due.  Flu shot: Get a flu shot every year.  Tetanus shot: Get a tetanus shot every 10 years.  Pneumococcal, hepatitis A, and RSV shots: Ask your care team if you need these based on your risk.  Shingles shot (for age 50 and up)  General health tests  Diabetes screening:  Starting at age 35, Get screened for diabetes at least every 3 years.  If you are younger than age 35, ask your care team if you should be screened for diabetes.  Cholesterol test: At age 39, start having a cholesterol test every 5 years, or more often if advised.  Bone density scan (DEXA): At age 50, ask your care team if you should have this scan for osteoporosis (brittle bones).  Hepatitis C: Get tested at least once in your life.  STIs (sexually transmitted infections)  Before age 24: Ask your care team if you should be screened  for STIs.  After age 24: Get screened for STIs if you're at risk. You are at risk for STIs (including HIV) if:  You are sexually active with more than one person.  You don't use condoms every time.  You or a partner was diagnosed with a sexually transmitted infection.  If you are at risk for HIV, ask about PrEP medicine to prevent HIV.  Get tested for HIV at least once in your life, whether you are at risk for HIV or not.  Cancer screening tests  Cervical cancer screening: If you have a cervix, begin getting regular cervical cancer screening tests starting at age 21.  Breast cancer scan (mammogram): If you've ever had breasts, begin having regular mammograms starting at age 40. This is a scan to check for breast cancer.  Colon cancer screening: It is important to start screening for colon cancer at age 45.  Have a colonoscopy test every 10 years (or more often if you're at risk) Or, ask your provider about stool tests like a FIT test every year or Cologuard test every 3 years.  To learn more about your testing options, visit:   .  For help making a decision, visit:   https://bit.ly/la06231.  Prostate cancer screening test: If you have a prostate, ask your care team if a prostate cancer screening test (PSA) at age 55 is right for you.  Lung cancer screening: If you are a current or former smoker ages 50 to 80, ask your care team if ongoing lung cancer screenings are right for you.  For informational purposes only. Not to replace the advice of your health care provider. Copyright   2023 [facility]. All rights reserved. Clinically reviewed by the [facility]. [employer] [id number] - REV 01/24.

## 2024-11-09 ENCOUNTER — TRANSFERRED RECORDS (OUTPATIENT)
Dept: HEALTH INFORMATION MANAGEMENT | Facility: CLINIC | Age: 49
End: 2024-11-09
Payer: COMMERCIAL

## 2025-01-19 ENCOUNTER — MYC REFILL (OUTPATIENT)
Dept: FAMILY MEDICINE | Facility: CLINIC | Age: 50
End: 2025-01-19
Payer: COMMERCIAL

## 2025-01-19 DIAGNOSIS — Z79.01 LONG TERM CURRENT USE OF ANTICOAGULANT THERAPY: ICD-10-CM

## 2025-01-19 DIAGNOSIS — I26.99 PULMONARY EMBOLISM WITHOUT ACUTE COR PULMONALE, UNSPECIFIED CHRONICITY, UNSPECIFIED PULMONARY EMBOLISM TYPE (H): ICD-10-CM

## 2025-01-20 RX ORDER — WARFARIN SODIUM 5 MG/1
TABLET ORAL
Qty: 120 TABLET | Refills: 1 | Status: SHIPPED | OUTPATIENT
Start: 2025-01-20

## 2025-01-20 NOTE — TELEPHONE ENCOUNTER
ANTICOAGULATION MANAGEMENT:  Medication Refill    Anticoagulation Summary  As of 1/10/2025      Warfarin maintenance plan:  5 mg (5 mg x 1) every Mon, Fri; 7.5 mg (5 mg x 1.5) all other days   Next INR check:  1/24/2025   Target end date:  Indefinite    Indications    Pulmonary embolism (H) [I26.99]  Long term current use of anticoagulant therapy [Z79.01]  Pulmonary embolism without acute cor pulmonale  unspecified chronicity  unspecified pulmonary embolism type (H) [I26.99]  Pulmonary embolism  unspecified chronicity  unspecified pulmonary embolism type  unspecified whether acute cor pulmonale present (H) [I26.99]                 Anticoagulation Care Providers       Provider Role Specialty Phone number    Yusef Katz MD Referring Family Medicine     Renata Bangura DO Referring Internal Medicine [phone]    Brooks Uriarte MD Referring Family Medicine [phone]            Refill Criteria    Visit with referring provider/group: Meets criteria: visit within referring provider group in the last 15 months on 11/7/24    [facility] referral last signed: 07/05/2024; within last year:  Yes    Lab monitoring not exceeding 2 weeks overdue: Yes    Yusef meets all criteria for refill. Rx instructions and quantity supplied updated to match patient's current dosing plan. Warfarin 90 day supply with 1 refill granted per [facility] protocol     Sonja Stone RN  Anticoagulation Clinic

## 2025-02-15 ENCOUNTER — TRANSFERRED RECORDS (OUTPATIENT)
Dept: HEALTH INFORMATION MANAGEMENT | Facility: CLINIC | Age: 50
End: 2025-02-15
Payer: COMMERCIAL

## 2025-03-03 DIAGNOSIS — E78.5 HYPERLIPIDEMIA LDL GOAL <100: ICD-10-CM

## 2025-03-04 RX ORDER — SIMVASTATIN 20 MG
TABLET ORAL
Qty: 90 TABLET | Refills: 2 | Status: SHIPPED | OUTPATIENT
Start: 2025-03-04

## 2025-04-22 ENCOUNTER — TELEPHONE (OUTPATIENT)
Dept: FAMILY MEDICINE | Facility: CLINIC | Age: 50
End: 2025-04-22
Payer: COMMERCIAL

## 2025-04-22 DIAGNOSIS — Z79.01 LONG TERM CURRENT USE OF ANTICOAGULANT THERAPY: ICD-10-CM

## 2025-04-22 DIAGNOSIS — I26.99 PULMONARY EMBOLISM, UNSPECIFIED CHRONICITY, UNSPECIFIED PULMONARY EMBOLISM TYPE, UNSPECIFIED WHETHER ACUTE COR PULMONALE PRESENT (H): ICD-10-CM

## 2025-04-22 DIAGNOSIS — I26.99 PULMONARY EMBOLISM WITHOUT ACUTE COR PULMONALE, UNSPECIFIED CHRONICITY, UNSPECIFIED PULMONARY EMBOLISM TYPE (H): ICD-10-CM

## 2025-04-22 DIAGNOSIS — I26.99 PULMONARY EMBOLISM (H): Primary | ICD-10-CM

## 2025-04-22 NOTE — TELEPHONE ENCOUNTER
General Call    Contacts       Contact Date/Time Type Contact Phone/Fax    04/22/2025 05:34 PM CDT Phone (Incoming) Kosta Alvares (Self) [phone] (M)     Ok to leave a detailed message          Reason for Call:  Calling to get dosing instructions - had to take warfarin dose of 1 1/2 pills today but only had one pill - ordered new prescription but wont be in until tomorrow (maybe morning) - general questions    What are your questions or concerns:  Calling to get dosing instructions - had to take warfarin dose of 1 1/2 pills today but only had one pill - ordered new prescription but wont be in until tomorrow (maybe morning) - general questions    Date of last appointment with provider: 0411    Could we send this information to you in [employer] or would you prefer to receive a phone call?:   Patient would prefer a phone call   Okay to leave a detailed message?: Yes at Home number on file [phone] (home)

## 2025-04-23 NOTE — TELEPHONE ENCOUNTER
Pt calling back said he did not hear from anyone looking to get instruction needs to get dosage due to running out of med   [phone] may leave a message

## 2025-04-23 NOTE — TELEPHONE ENCOUNTER
Patient ran out of warfarin yesterday. He was able to take one tablet. Pt will take 2 tablets today to make up for the 1/2 tablet missed dose yesterday.  Patient verbalizes understanding and agrees to plan. No further questions or concerns.  Thalia Sood RN on 4/23/2025 at 10:12 AM

## 2025-05-01 ENCOUNTER — OFFICE VISIT (OUTPATIENT)
Dept: FAMILY MEDICINE | Facility: CLINIC | Age: 50
End: 2025-05-01
Payer: COMMERCIAL

## 2025-05-01 VITALS
DIASTOLIC BLOOD PRESSURE: 82 MMHG | TEMPERATURE: 98.1 F | HEIGHT: 65 IN | OXYGEN SATURATION: 95 % | BODY MASS INDEX: 33.99 KG/M2 | SYSTOLIC BLOOD PRESSURE: 130 MMHG | WEIGHT: 204 LBS | RESPIRATION RATE: 20 BRPM | HEART RATE: 84 BPM

## 2025-05-01 DIAGNOSIS — M54.41 CHRONIC MIDLINE LOW BACK PAIN WITH RIGHT-SIDED SCIATICA: Primary | ICD-10-CM

## 2025-05-01 DIAGNOSIS — M54.2 CERVICALGIA: ICD-10-CM

## 2025-05-01 DIAGNOSIS — G89.29 CHRONIC MIDLINE LOW BACK PAIN WITH RIGHT-SIDED SCIATICA: Primary | ICD-10-CM

## 2025-05-01 ASSESSMENT — ENCOUNTER SYMPTOMS: NUMBNESS: 1

## 2025-05-01 ASSESSMENT — PAIN SCALES - GENERAL: PAINLEVEL_OUTOF10: MILD PAIN (3)

## 2025-05-01 NOTE — PROGRESS NOTES
Assessment & Plan     Chronic midline low back pain with right-sided sciatica  Per patient improving. Exam with no red flags or elicited radicular pain.  Expect this would still resolve without specialty consult. No clear indication for imaging.  Patient agreed to try physical therapy to complete the healing.  Activity as tolerated.  Return precautions discussed and given to patient.   - Physical Therapy  Referral    Cervicalgia  Per patient improving. Exam with no red flags or elicited radicular pain.  Expect this would still resolve without specialty consult. No clear indication for imaging.  Patient agreed to try physical therapy to complete the healing.  Activity as tolerated.  Return precautions discussed and given to patient.   - Physical Therapy  Referral      Follow-up   Return in about 2 months (around 7/1/2025) for see provider in person if no improvement with 2 months of therapy.        Jyoti Brambila is a 49 year old, presenting for the following health issues:  Numbness (X several months, Right leg, intermittent, improves with activity ) and Neck Pain (X1 month, radiates to left shoulder, improving )        5/1/2025    10:01 AM   Additional Questions   Roomed by Elizabeth HALL MA   Accompanied by self         5/1/2025    10:01 AM   Patient Reported Additional Medications   Patient reports taking the following new medications none     Numbness  Associated symptoms include numbness.   History of Present Illness       Reason for visit:  Numbness and pain in my right leg   He is taking medications regularly.      Chief Complaint   Patient presents with    Numbness     X several months, Right leg, intermittent, improves with activity     Neck Pain     X1 month, radiates to left shoulder, improving        Back Pain     Duration: several months        Specific cause: none  Description:   Location of pain: low back middle - has resolved  Character of pain: sharp and dull ache - has  "resolved  Pain radiation:radiates into the right buttocks, radiates into the right leg, and radiates into the right foot  New numbness or weakness in legs, not attributed to pain:  YES, right numbness to foot- but has improved - now just intermittent when walking for long distances  Intensity: Currently 2-3/10  History:   Pain interferes with job: Not applicable  History of back problems: no prior back problems  Any previous back MRI or X-rays: None  Sees a specialist for back pain:  No  Therapies tried without relief: activity  Alleviating factors:   Improved by: activity    Precipitating factors:  Worsened by: walking long distances    Accompanying Signs & Symptoms:  Risk of Fracture:  None  Risk of Cauda Equina:  None  Risk of Infection:  None  Risk of Cancer:  None  Risk of Ankylosing Spondylitis:  Onset at age <35, male, AND morning back stiffness. no        Musculoskeletal problem/pain    Duration: 1 month  Description  Location: posterior low neck  Intensity:  mild, moderate; now better but not completely resolved  Accompanying signs and symptoms: radiation of pain to left shoulder intermittently  History  Previous similar problem: no   Previous evaluation:  none  Precipitating or alleviating factors:  Trauma or overuse: no   Aggravating factors include: none  Therapies tried and outcome: nothing           Review of Systems  Constitutional, HEENT, cardiovascular, pulmonary, GI, , musculoskeletal, neuro, skin, endocrine and psych systems are negative, except as otherwise noted.      Objective    /82 (BP Location: Right arm, Patient Position: Sitting, Cuff Size: Adult Regular)   Pulse 84   Temp 98.1  F (36.7  C) (Tympanic)   Resp 20   Ht 1.645 m (5' 4.75\")   Wt 92.5 kg (204 lb)   SpO2 95%   BMI 34.21 kg/m    Body mass index is 34.21 kg/m .  Physical Exam   GENERAL:  alert and no distress, ambulatory w/o assist, normal gait  NECK: no gross deformity; no TTP; full range of motion with mild " posterior pain on forward flexion, no radicular pain elicited  MS: extremities- no gross deformities noted, no edema  SKIN: no suspicious lesions, no rashes  Neuro: Patient is A and O X 3, Cranial nerves 2-12 intact, PERRL, Strength 5/5 all extremities,  Sensory intact, DTRs 2+ x 4, No problems with motor coordination  BACK: normal curvature, no deformity, no skin changes, no tendernes on palpation, range of motion full, SLR negative bilaterally     No results found for any visits on 05/01/25.        Signed Electronically by: Brooks Uriarte MD

## 2025-05-01 NOTE — PATIENT INSTRUCTIONS
No red flags today.    A referral to physical therapy has been placed.  A  will contact you in the next few business days.  Contact the care team if you do not hear from them after a week.    Give about 2 months of physical therapy to treat your pains.    Expect improvement over that period.    Read more information about your conditions in the handouts attached to this visit summary.

## 2025-05-16 PROBLEM — M54.2 CERVICALGIA: Status: ACTIVE | Noted: 2025-05-16

## 2025-05-30 ENCOUNTER — DOCUMENTATION ONLY (OUTPATIENT)
Dept: ANTICOAGULATION | Facility: CLINIC | Age: 50
End: 2025-05-30

## 2025-05-30 DIAGNOSIS — Z86.711 HISTORY OF PULMONARY EMBOLISM: ICD-10-CM

## 2025-05-30 DIAGNOSIS — Z79.01 LONG TERM CURRENT USE OF ANTICOAGULANT THERAPY: Primary | ICD-10-CM

## 2025-05-30 NOTE — PROGRESS NOTES
ANTICOAGULATION CLINIC REFERRAL RENEWAL REQUEST       An annual renewal order is required for all patients referred to [facility].?  Please review and sign the pended referral order for Yusef Alvares.       ANTICOAGULATION SUMMARY      Warfarin indication(s)   PE    Mechanical heart valve present?  NO       Current goal range   INR: 2.0-3.0     Goal appropriate for indication? Goal INR 2-3, standard for indication(s) above     Time in Therapeutic Range (TTR)  (Goal > 60%) 83.3%       Office visit with referring provider's group within last year yes on 5/1/25       Juliet Dwyer RN  [facility]

## 2025-06-02 ENCOUNTER — RESULTS FOLLOW-UP (OUTPATIENT)
Dept: FAMILY MEDICINE | Facility: CLINIC | Age: 50
End: 2025-06-02

## 2025-06-02 PROBLEM — Z86.711 HISTORY OF PULMONARY EMBOLISM: Status: ACTIVE | Noted: 2025-06-02

## 2025-06-11 ENCOUNTER — TRANSFERRED RECORDS (OUTPATIENT)
Dept: HEALTH INFORMATION MANAGEMENT | Facility: CLINIC | Age: 50
End: 2025-06-11
Payer: COMMERCIAL

## 2025-06-26 DIAGNOSIS — Z79.01 LONG TERM CURRENT USE OF ANTICOAGULANT THERAPY: ICD-10-CM

## 2025-06-26 DIAGNOSIS — I26.99 PULMONARY EMBOLISM WITHOUT ACUTE COR PULMONALE, UNSPECIFIED CHRONICITY, UNSPECIFIED PULMONARY EMBOLISM TYPE (H): ICD-10-CM

## 2025-06-26 RX ORDER — WARFARIN SODIUM 5 MG/1
TABLET ORAL
Qty: 120 TABLET | Refills: 1 | Status: SHIPPED | OUTPATIENT
Start: 2025-06-26

## 2025-06-26 NOTE — TELEPHONE ENCOUNTER
ANTICOAGULATION MANAGEMENT:  Medication Refill    Anticoagulation Summary  As of 5/30/2025      Warfarin maintenance plan:  5 mg (5 mg x 1) every Mon, Fri; 7.5 mg (5 mg x 1.5) all other days   Next INR check:  7/11/2025   Target end date:  Indefinite    Indications    Pulmonary embolism (H) [I26.99]  Long term current use of anticoagulant therapy [Z79.01]  Pulmonary embolism without acute cor pulmonale  unspecified chronicity  unspecified pulmonary embolism type (H) [I26.99]  Pulmonary embolism  unspecified chronicity  unspecified pulmonary embolism type  unspecified whether acute cor pulmonale present (H) [I26.99]                 Anticoagulation Care Providers       Provider Role Specialty Phone number    Yusef Katz MD Referring Family Medicine     Renata Bangura DO Referring Internal Medicine [phone]    Brooks Uriarte MD Referring Family Medicine [phone]            Refill Criteria    Visit with referring provider/group: Meets criteria: visit within referring provider group in the last 15 months on 5/1/25    [facility] referral last signed: 06/02/2025; within last year:  Yes    Lab monitoring is up to date (not exceeding 2 weeks overdue): Yes    Yusef meets all criteria for refill. Rx instructions and quantity match patient's current dosing plan. Warfarin 90 day supply with 1 refill granted per [facility] protocol     Juliet Dwyer RN  Anticoagulation Clinic

## 2025-08-20 ENCOUNTER — LAB (OUTPATIENT)
Dept: LAB | Facility: CLINIC | Age: 50
End: 2025-08-20
Payer: COMMERCIAL

## 2025-08-20 ENCOUNTER — ANTICOAGULATION THERAPY VISIT (OUTPATIENT)
Dept: ANTICOAGULATION | Facility: CLINIC | Age: 50
End: 2025-08-20

## 2025-08-20 DIAGNOSIS — Z86.711 HISTORY OF PULMONARY EMBOLISM: ICD-10-CM

## 2025-08-20 DIAGNOSIS — Z79.01 LONG TERM CURRENT USE OF ANTICOAGULANT THERAPY: ICD-10-CM

## 2025-08-20 DIAGNOSIS — I26.99 PULMONARY EMBOLISM, UNSPECIFIED CHRONICITY, UNSPECIFIED PULMONARY EMBOLISM TYPE, UNSPECIFIED WHETHER ACUTE COR PULMONALE PRESENT (H): ICD-10-CM

## 2025-08-20 DIAGNOSIS — I26.99 PULMONARY EMBOLISM (H): Primary | ICD-10-CM

## 2025-08-20 DIAGNOSIS — I26.99 PULMONARY EMBOLISM WITHOUT ACUTE COR PULMONALE, UNSPECIFIED CHRONICITY, UNSPECIFIED PULMONARY EMBOLISM TYPE (H): ICD-10-CM

## 2025-08-20 LAB — INR BLD: 2.3 (ref 0.9–1.1)

## 2025-08-20 PROCEDURE — 85610 PROTHROMBIN TIME: CPT

## 2025-08-20 PROCEDURE — 36415 COLL VENOUS BLD VENIPUNCTURE: CPT

## (undated) RX ORDER — LIDOCAINE HYDROCHLORIDE 10 MG/ML
INJECTION, SOLUTION INFILTRATION; PERINEURAL
Status: DISPENSED
Start: 2023-03-20